# Patient Record
Sex: FEMALE | Race: WHITE | NOT HISPANIC OR LATINO | Employment: OTHER | URBAN - METROPOLITAN AREA
[De-identification: names, ages, dates, MRNs, and addresses within clinical notes are randomized per-mention and may not be internally consistent; named-entity substitution may affect disease eponyms.]

---

## 2017-03-01 ENCOUNTER — OFFICE VISIT (OUTPATIENT)
Dept: FAMILY MEDICINE CLINIC | Facility: CLINIC | Age: 77
End: 2017-03-01

## 2017-03-01 VITALS
HEART RATE: 54 BPM | WEIGHT: 109 LBS | RESPIRATION RATE: 16 BRPM | DIASTOLIC BLOOD PRESSURE: 73 MMHG | TEMPERATURE: 98.3 F | HEIGHT: 62 IN | SYSTOLIC BLOOD PRESSURE: 141 MMHG | BODY MASS INDEX: 20.06 KG/M2

## 2017-03-01 DIAGNOSIS — Z12.31 ENCOUNTER FOR SCREENING MAMMOGRAM FOR MALIGNANT NEOPLASM OF BREAST: ICD-10-CM

## 2017-03-01 DIAGNOSIS — N95.9 MENOPAUSAL AND PERIMENOPAUSAL DISORDER: ICD-10-CM

## 2017-03-01 DIAGNOSIS — Z13.6 SCREENING FOR ISCHEMIC HEART DISEASE: ICD-10-CM

## 2017-03-01 DIAGNOSIS — I10 ESSENTIAL HYPERTENSION: Primary | ICD-10-CM

## 2017-03-01 DIAGNOSIS — F43.21 GRIEF REACTION: ICD-10-CM

## 2017-03-01 DIAGNOSIS — Z12.39 BREAST CANCER SCREENING: ICD-10-CM

## 2017-03-01 DIAGNOSIS — R32 URINARY INCONTINENCE, UNSPECIFIED TYPE: ICD-10-CM

## 2017-03-01 DIAGNOSIS — K59.04 CHRONIC IDIOPATHIC CONSTIPATION: ICD-10-CM

## 2017-03-01 DIAGNOSIS — F33.42 RECURRENT MAJOR DEPRESSIVE DISORDER, IN FULL REMISSION (HCC): ICD-10-CM

## 2017-03-01 PROCEDURE — 99214 OFFICE O/P EST MOD 30 MIN: CPT | Performed by: FAMILY MEDICINE

## 2017-03-01 RX ORDER — OXYBUTYNIN CHLORIDE 10 MG/1
10 TABLET, EXTENDED RELEASE ORAL DAILY
Qty: 90 TABLET | Refills: 1 | Status: SHIPPED | OUTPATIENT
Start: 2017-03-01 | End: 2017-05-23 | Stop reason: SDUPTHER

## 2017-03-01 RX ORDER — LISINOPRIL 5 MG/1
5 TABLET ORAL DAILY
Qty: 90 TABLET | Refills: 1 | Status: SHIPPED | OUTPATIENT
Start: 2017-03-01 | End: 2017-05-23 | Stop reason: SDUPTHER

## 2017-03-01 RX ORDER — CITALOPRAM 20 MG/1
20 TABLET ORAL DAILY
Qty: 90 TABLET | Refills: 1 | Status: SHIPPED | OUTPATIENT
Start: 2017-03-01 | End: 2017-05-23 | Stop reason: SDUPTHER

## 2017-03-01 NOTE — PROGRESS NOTES
"Chief Complaint   Patient presents with   • Hypertension   • Hypothyroidism       Subjective   Pt is here for bp  Check . It is stable. Depression is stable. Urinary sx are stable. No medication side effects noted. Mammogram is due.   PHQ-2 Depression Screening Questionaire    During the last month, have you often been bothered by feeling down, depressed, or hopeless? no    During the last month, have you often been bothered by having little interest or pleasure in doing things? no    Review of Systems   Constitutional: Negative for fatigue.   Cardiovascular: Negative for chest pain.   Gastrointestinal: Positive for constipation.       Objective   Visit Vitals   • /73   • Pulse 54   • Temp 98.3 °F (36.8 °C) (Oral)   • Resp 16   • Ht 62\" (157.5 cm)   • Wt 109 lb (49.4 kg)   • BMI 19.94 kg/m2     Body mass index is 19.94 kg/(m^2).  Physical Exam   Constitutional: She is cooperative. No distress.   Eyes: Conjunctivae and lids are normal.   Neck: Carotid bruit is not present. No tracheal deviation present.   Cardiovascular: Normal rate, regular rhythm and normal heart sounds.    No murmur heard.  Pulmonary/Chest: Effort normal and breath sounds normal.   Neurological: She is alert. She is not disoriented.   Skin: Skin is warm and dry.   Psychiatric: She has a normal mood and affect. Her speech is normal and behavior is normal.   Vitals reviewed.      Assessment/Plan     Problem List Items Addressed This Visit        Cardiovascular and Mediastinum    Essential hypertension - Primary    Relevant Medications    lisinopril (PRINIVIL,ZESTRIL) 5 MG tablet    Other Relevant Orders    Comprehensive metabolic panel    CBC and Differential    TSH       Digestive    Chronic idiopathic constipation       Other    Grief reaction    Relevant Medications    citalopram (CeleXA) 20 MG tablet    Depression    Relevant Medications    citalopram (CeleXA) 20 MG tablet    Other Relevant Orders    Comprehensive metabolic panel    CBC " and Differential    Urinary incontinence    Relevant Medications    oxybutynin XL (DITROPAN-XL) 10 MG 24 hr tablet    Other Relevant Orders    Comprehensive metabolic panel    CBC and Differential    Menopausal and perimenopausal disorder    Relevant Medications    estrogens, conjugated, (PREMARIN) 0.625 MG tablet      Other Visit Diagnoses     Breast cancer screening        Relevant Orders    Mammo Screening Bilateral With CAD    Encounter for screening mammogram for malignant neoplasm of breast         Relevant Orders    Mammo Screening Bilateral With CAD    Screening for ischemic heart disease        Relevant Orders    Lipid Panel With LDL/HDL Ratio          Outpatient Encounter Prescriptions as of 3/1/2017   Medication Sig Dispense Refill   • acetaminophen (TYLENOL) 500 MG tablet Take 500 mg by mouth every 6 (six) hours as needed for mild pain (1-3).     • aspirin 81 MG tablet Take 81 mg by mouth daily.     • Calcium Carb-Cholecalciferol (CALCIUM + D3 PO) Take  by mouth.     • levothyroxine (SYNTHROID, LEVOTHROID) 112 MCG tablet Take 112 mcg by mouth daily.     • Multiple Vitamin (MULTIVITAMIN) tablet Take 1 tablet by mouth daily.     • citalopram (CeleXA) 20 MG tablet Take 1 tablet by mouth Daily for 180 days. 90 tablet 1   • estrogens, conjugated, (PREMARIN) 0.625 MG tablet Take 1 tablet by mouth Daily for 180 days. 90 tablet 1   • lisinopril (PRINIVIL,ZESTRIL) 5 MG tablet Take 1 tablet by mouth Daily for 180 days. 90 tablet 1   • oxybutynin XL (DITROPAN-XL) 10 MG 24 hr tablet Take 1 tablet by mouth Daily for 180 days. 90 tablet 1   • [DISCONTINUED] citalopram (CeleXA) 20 MG tablet Take 1 tablet by mouth daily for 180 days. 90 tablet 1   • [DISCONTINUED] estrogens, conjugated, (PREMARIN) 0.625 MG tablet Take 1 tablet by mouth daily for 180 days. 90 tablet 1   • [DISCONTINUED] lisinopril (PRINIVIL,ZESTRIL) 5 MG tablet Take 1 tablet by mouth daily for 180 days. 90 tablet 1   • [DISCONTINUED] oxybutynin XL  (DITROPAN-XL) 10 MG 24 hr tablet Take 1 tablet by mouth daily for 180 days. 90 tablet 1     No facility-administered encounter medications on file as of 3/1/2017.        Orders Placed This Encounter   Procedures   • Mammo Screening Bilateral With CAD     Standing Status:   Future     Standing Expiration Date:   8/28/2017     Order Specific Question:   Reason for Exam:     Answer:   screening   • Comprehensive metabolic panel     Standing Status:   Future     Standing Expiration Date:   11/26/2017   • Lipid Panel With LDL/HDL Ratio     Standing Status:   Future     Standing Expiration Date:   11/26/2017   • TSH     Standing Status:   Future     Standing Expiration Date:   11/26/2017       Continue with current treatment plan.  Dash diet and info on constipation sent on AVS.       F/U in 6 months

## 2017-03-01 NOTE — PATIENT INSTRUCTIONS
Constipation, Adult  Constipation is when a person has fewer than three bowel movements a week, has difficulty having a bowel movement, or has stools that are dry, hard, or larger than normal. As people grow older, constipation is more common. A low-fiber diet, not taking in enough fluids, and taking certain medicines may make constipation worse.   CAUSES   · Certain medicines, such as antidepressants, pain medicine, iron supplements, antacids, and water pills.    · Certain diseases, such as diabetes, irritable bowel syndrome (IBS), thyroid disease, or depression.    · Not drinking enough water.    · Not eating enough fiber-rich foods.    · Stress or travel.    · Lack of physical activity or exercise.    · Ignoring the urge to have a bowel movement.    · Using laxatives too much.    SIGNS AND SYMPTOMS   · Having fewer than three bowel movements a week.    · Straining to have a bowel movement.    · Having stools that are hard, dry, or larger than normal.    · Feeling full or bloated.    · Pain in the lower abdomen.    · Not feeling relief after having a bowel movement.    DIAGNOSIS   Your health care provider will take a medical history and perform a physical exam. Further testing may be done for severe constipation. Some tests may include:  · A barium enema X-ray to examine your rectum, colon, and, sometimes, your small intestine.    · A sigmoidoscopy to examine your lower colon.    · A colonoscopy to examine your entire colon.  TREATMENT   Treatment will depend on the severity of your constipation and what is causing it. Some dietary treatments include drinking more fluids and eating more fiber-rich foods. Lifestyle treatments may include regular exercise. If these diet and lifestyle recommendations do not help, your health care provider may recommend taking over-the-counter laxative medicines to help you have bowel movements. Prescription medicines may be prescribed if over-the-counter medicines do not work.  "  HOME CARE INSTRUCTIONS   · Eat foods that have a lot of fiber, such as fruits, vegetables, whole grains, and beans.  · Limit foods high in fat and processed sugars, such as french fries, hamburgers, cookies, candies, and soda.    · A fiber supplement may be added to your diet if you cannot get enough fiber from foods.    · Drink enough fluids to keep your urine clear or pale yellow.    · Exercise regularly or as directed by your health care provider.    · Go to the restroom when you have the urge to go. Do not hold it.    · Only take over-the-counter or prescription medicines as directed by your health care provider. Do not take other medicines for constipation without talking to your health care provider first.    SEEK IMMEDIATE MEDICAL CARE IF:   · You have bright red blood in your stool.    · Your constipation lasts for more than 4 days or gets worse.    · You have abdominal or rectal pain.    · You have thin, pencil-like stools.    · You have unexplained weight loss.  MAKE SURE YOU:   · Understand these instructions.  · Will watch your condition.  · Will get help right away if you are not doing well or get worse.     This information is not intended to replace advice given to you by your health care provider. Make sure you discuss any questions you have with your health care provider.     Document Released: 09/15/2005 Document Revised: 01/08/2016 Document Reviewed: 09/29/2014  [employer] Interactive Patient Education ©2016 [employer]  DASH Eating Plan  DASH stands for \"Dietary Approaches to Stop Hypertension.\" The DASH eating plan is a healthy eating plan that has been shown to reduce high blood pressure (hypertension). Additional health benefits may include reducing the risk of type 2 diabetes mellitus, heart disease, and stroke. The DASH eating plan may also help with weight loss.  WHAT DO I NEED TO KNOW ABOUT THE DASH EATING PLAN?  For the DASH eating plan, you will follow these general " "guidelines:  · Choose foods with a percent daily value for sodium of less than 5% (as listed on the food label).  · Use salt-free seasonings or herbs instead of table salt or sea salt.  · Check with your health care provider or pharmacist before using salt substitutes.  · Eat lower-sodium products, often labeled as \"lower sodium\" or \"no salt added.\"  · Eat fresh foods.  · Eat more vegetables, fruits, and low-fat dairy products.  · Choose whole grains. Look for the word \"whole\" as the first word in the ingredient list.  · Choose fish and skinless chicken or turkey more often than red meat. Limit fish, poultry, and meat to 6 oz (170 g) each day.  · Limit sweets, desserts, sugars, and sugary drinks.  · Choose heart-healthy fats.  · Limit cheese to 1 oz (28 g) per day.  · Eat more home-cooked food and less restaurant, buffet, and fast food.  · Limit fried foods.  · Cook foods using methods other than frying.  · Limit canned vegetables. If you do use them, rinse them well to decrease the sodium.  · When eating at a restaurant, ask that your food be prepared with less salt, or no salt if possible.  WHAT FOODS CAN I EAT?  Seek help from a dietitian for individual calorie needs.  Grains  Whole grain or whole wheat bread. Brown rice. Whole grain or whole wheat pasta. Quinoa, bulgur, and whole grain cereals. Low-sodium cereals. Corn or whole wheat flour tortillas. Whole grain cornbread. Whole grain crackers. Low-sodium crackers.  Vegetables  Fresh or frozen vegetables (raw, steamed, roasted, or grilled). Low-sodium or reduced-sodium tomato and vegetable juices. Low-sodium or reduced-sodium tomato sauce and paste. Low-sodium or reduced-sodium canned vegetables.   Fruits  All fresh, canned (in natural juice), or frozen fruits.  Meat and Other Protein Products  Ground beef (85% or leaner), grass-fed beef, or beef trimmed of fat. Skinless chicken or turkey. Ground chicken or turkey. Pork trimmed of fat. All fish and seafood. " Eggs. Dried beans, peas, or lentils. Unsalted nuts and seeds. Unsalted canned beans.  Dairy  Low-fat dairy products, such as skim or 1% milk, 2% or reduced-fat cheeses, low-fat ricotta or cottage cheese, or plain low-fat yogurt. Low-sodium or reduced-sodium cheeses.  Fats and Oils  Tub margarines without trans fats. Light or reduced-fat mayonnaise and salad dressings (reduced sodium). Avocado. Safflower, olive, or canola oils. Natural peanut or almond butter.  Other  Unsalted popcorn and pretzels.  The items listed above may not be a complete list of recommended foods or beverages. Contact your dietitian for more options.  WHAT FOODS ARE NOT RECOMMENDED?  Grains  White bread. White pasta. White rice. Refined cornbread. Bagels and croissants. Crackers that contain trans fat.  Vegetables  Creamed or fried vegetables. Vegetables in a cheese sauce. Regular canned vegetables. Regular canned tomato sauce and paste. Regular tomato and vegetable juices.  Fruits  Dried fruits. Canned fruit in light or heavy syrup. Fruit juice.  Meat and Other Protein Products  Fatty cuts of meat. Ribs, chicken wings, irizarry, sausage, bologna, salami, chitterlings, fatback, hot dogs, bratwurst, and packaged luncheon meats. Salted nuts and seeds. Canned beans with salt.  Dairy  Whole or 2% milk, cream, half-and-half, and cream cheese. Whole-fat or sweetened yogurt. Full-fat cheeses or blue cheese. Nondairy creamers and whipped toppings. Processed cheese, cheese spreads, or cheese curds.  Condiments  Onion and garlic salt, seasoned salt, table salt, and sea salt. Canned and packaged gravies. Worcestershire sauce. Tartar sauce. Barbecue sauce. Teriyaki sauce. Soy sauce, including reduced sodium. Steak sauce. Fish sauce. Oyster sauce. Cocktail sauce. Horseradish. Ketchup and mustard. Meat flavorings and tenderizers. Bouillon cubes. Hot sauce. Tabasco sauce. Marinades. Taco seasonings. Relishes.  Fats and Oils  Butter, stick margarine, lard,  shortening, ghee, and irizarry fat. Coconut, palm kernel, or palm oils. Regular salad dressings.  Other  Pickles and olives. Salted popcorn and pretzels.  The items listed above may not be a complete list of foods and beverages to avoid. Contact your dietitian for more information.  WHERE CAN I FIND MORE INFORMATION?  National Heart, [employer], and [employer] [place]: www.nhlbi.nih.gov/health/health-topics/topics/dash/     This information is not intended to replace advice given to you by your health care provider. Make sure you discuss any questions you have with your health care provider.     Document Released: 12/06/2012 Document Revised: 01/08/2016 Document Reviewed: 10/22/2014  [employer] Interactive Patient Education ©2016 [employer]

## 2017-03-30 ENCOUNTER — OFFICE VISIT (OUTPATIENT)
Dept: FAMILY MEDICINE CLINIC | Facility: CLINIC | Age: 77
End: 2017-03-30

## 2017-03-30 VITALS
DIASTOLIC BLOOD PRESSURE: 71 MMHG | WEIGHT: 109 LBS | RESPIRATION RATE: 16 BRPM | BODY MASS INDEX: 20.06 KG/M2 | HEART RATE: 66 BPM | TEMPERATURE: 98.1 F | HEIGHT: 62 IN | SYSTOLIC BLOOD PRESSURE: 138 MMHG

## 2017-03-30 DIAGNOSIS — D64.9 ANEMIA, UNSPECIFIED: ICD-10-CM

## 2017-03-30 DIAGNOSIS — N39.0 URINARY TRACT INFECTION, SITE UNSPECIFIED: ICD-10-CM

## 2017-03-30 DIAGNOSIS — Z09 HOSPITAL DISCHARGE FOLLOW-UP: Primary | ICD-10-CM

## 2017-03-30 PROCEDURE — 99214 OFFICE O/P EST MOD 30 MIN: CPT | Performed by: FAMILY MEDICINE

## 2017-03-30 RX ORDER — METAXALONE 800 MG/1
800 TABLET ORAL 3 TIMES DAILY
COMMUNITY
Start: 2017-03-05 | End: 2017-03-23 | Stop reason: SINTOL

## 2017-03-30 RX ORDER — NITROFURANTOIN 25; 75 MG/1; MG/1
100 CAPSULE ORAL 2 TIMES DAILY
COMMUNITY
Start: 2017-03-21 | End: 2017-03-30

## 2017-03-30 NOTE — PROGRESS NOTES
Chief Complaint   Patient presents with   • hospital follow up       Subjective   Presents the office to follow-up on recent visits to urgent care and emergency room.  Her initial visit was to urgent care on arch fifth 2017.  She had been cleaning behind her washer and dryer.  She pulled on the dry with no problems.  She pulled on the washer and strained her neck.  She was seen initially and prescribed with Skelaxin to use as needed for spasm.  This note was documented  on 2017.The patient then went home and about 1 week later was once again cleaning behind the washer.  She stepped on a step stool and felt dizziness and then slipped down between the wall and the door.  She had injuries and went back to the emergency room.  The second emergency room visit was 2017 for neck pain and laceration of left arm. Labs showed low RBC and hemoglobin. Pt still feels bad with anemia. Head is ringing and buzzing. Pt quit nitrofurantoin for UTI.    Current medications have been reviewed and reconciled with discharge medications during today's encounter visit.   RADIOLOGY REPORT    FACILITY:  [facility]  UNIT/AGE/GENDER: J.ED  ER      AGE:76 Y          SEX:F  PATIENT NAME/:  JOSE TANG L    1940  UNIT NUMBER:  [id number]  ACCOUNT NUMBER:  [id number]  ACCESSION NUMBER:  [id number]  [id number]  [id number]    CT HEAD WO CONTRAST, CT LUMBAR SPINE WO CONTRAST, CT CERVICAL SPINE WO CONTRAST    DATE: 2017    COMPARISON: Cervical spine plain film from 2016, sacrum and coccyx films from 2014 and CT of the abdomen and pelvis from May 28, 2009.    INDICATION: fall 1 week ago, off-balance since. Posterior head injury. Neck pain and lower back pain. Initial encounter.    TECHNIQUE:  Axial/helical CT imaging was performed through the head, cervical spine, and lumbar spine without contrast.. A radiation dose reduction device was utilized for this exam as per  ALARA protocol.    FINDINGS:     CT HEAD:    Mild generalized sulcal prominence could reflect atrophy. Ventricles and cisterns are patent. No extra-axial fluid collections. No acute hemorrhage or mass effect. Low-attenuation changes in the white matter regions of the brain are nonspecific although   most compatible with chronic ischemic/gliotic small vessel disease and/or demyelination. Small older appearing lacunar type infarcts noted in the right basal ganglia and thalamic region.    Atherosclerotic vascular calcifications are noted along the carotid siphons and distal vertebral arteries. No asymmetric hyperdense vessel. The bony calvarium is intact. Right mastoid is clear. Partial opacification of the left mastoid air cells could   reflect mastoid effusion. Visualized paranasal sinuses are patent.    IMPRESSION:  1. No acute intracranial hemorrhage or mass effect.  2. Mild atrophy.  3. Other chronic appearing changes as described.  4. Evidence of left mastoid effusion.    CT cervical spine:    There is a reversal of the usual cervical lordosis. There is a grade 1 anterolisthesis of C3 on C4 and C4 on C5. There is minimal anterolisthesis of C2 on C3 and C5 on C6 as well as C7 on T1. There is slight retrolisthesis of C6 on C7. No acute fracture   is seen.    There is multilevel degenerative change with severe disc degenerative change at C5-C6 and C6-C7. Posterior lateral disc osteophyte formation at multiple levels along with multilevel facet degenerative change contributing to some multilevel foraminal   stenosis. Spinal canal is patent with no significant stenosis demonstrated. Biapical parenchymal changes could be chronic in nature although superimposed acute infiltrate is difficult to exclude. There is evidence of mild bronchiectasis towards the   apical regions. Calcifications are noted consistent with old healed granulomatous disease. Atherosclerotic calcifications noted near the carotid  bifurcations.    IMPRESSION:  1. No definite acute bony abnormality.  2. Multilevel degenerative/arthritic changes along the cervical spine as described above.  3. Cervical alignment as described.  4. Parenchymal changes in both apical regions likely chronic in nature although comparison studies are not available to document stability. Comparison studies would be helpful if available. If none are available, would consider chest CT follow-up    Lumbar spine:    Dextroscoliosis of the lumbar spine is again noted with a left lateral listhesis of L2 on L3 and L3 on L4 and right lateral listhesis of L4 on L5. There is minimal anterolisthesis of L4 on L5. Vertebral body heights are maintained. There is multilevel   disc degenerative change and vacuum disc phenomenon with severe narrowing of the disc spaces at L2-L3, L3-L4, and L4-L5. There is multilevel facet degenerative change.    There is mild posterior lateral disc bulge at L1-L2. Spinal canal and foramina are patent.    There is moderate posterior lateral disc bulge and/or protrusion at L2-L3. There is mild to moderate narrowing of the spinal canal. There is mild bilateral foraminal narrowing    L3-L4 there is posterior lateral disc osteophyte complex with asymmetry towards the left. There is calcification and/or ossification along the posterior protruding disc. There is facet and ligamentum flavum hypertrophic change. There is moderate central   stenosis. There is left greater than right foraminal stenosis.    At L4-L5 there is a posterior lateral disc osteophyte complex with asymmetry towards the left. There is facet and ligament flavum hypertrophic change with mild/moderate central stenosis and left greater than right foraminal stenosis.    At L5-S1 there is a posterior convex disc bulge or slight protrusion. There is bilateral facet disease with right greater than left foraminal stenosis.    There is some mild degenerative change of the sacroiliac  "joints.    There is mild decreased density of the blood within the aorta compared to the aortic wall. This could possibly reflect the presence of anemia.    IMPRESSION:  1. No acute bony abnormality.  2. Multilevel degenerative changes which contribute to spinal and foraminal stenosis.  3. Lumbar alignment as described above.  4. Lower density of the blood within the aorta can possibly indicate the presence of anemia. Correlate with pertinent clinical and laboratory findings.    Dictated by: Don Leong M.D.    Images and Report reviewed and interpreted by: Don Leong M.D.  Urinalysis3/21/2017  [facility]  Component Name Value Ref Range   Color-Urine YELLOW     Clarity-Urine CLEAR     Glucose-Urine NEGATIVE NEGATIVE    Bilirubin-Urine NEGATIVE NEGATIVE    Ketone-Urine TRACE (A) NEGATIVE    Specific Gravity-Urine 1.010 1.005 - 1.03      Occult Blood-Urine SMALL (A) NEGATIVE    pH-Urine 7.5 5.0 - 9.0      Protein-Urine TRACE (A) NEGATIVE    Urobilinogen-Urine 0.2 <=1.0 (EhrlichU)/dL    Nitrite-Urine NEGATIVE     Leukocyte Esterase-Urine TRACE (A) NEGATIVE    Source-Urine CLEAN CATCH          Review of Systems   Constitutional: Positive for fatigue.   HENT: Positive for tinnitus.    Eyes: Positive for visual disturbance (blurry vision).   Neurological: Negative for dizziness.       Objective   /71  Pulse 66  Temp 98.1 °F (36.7 °C) (Oral)   Resp 16  Ht 62\" (157.5 cm)  Wt 109 lb (49.4 kg)  BMI 19.94 kg/m2  Body mass index is 19.94 kg/(m^2).  Physical Exam   Constitutional: She is cooperative. No distress.   HENT:   Upper lip fever blister   Eyes: Conjunctivae and lids are normal.   Neck: Carotid bruit is not present. No tracheal deviation present.   Cardiovascular: Normal rate, regular rhythm and normal heart sounds.    No murmur heard.  Pulmonary/Chest: Effort normal and breath sounds normal.   Neurological: She is alert. She is not disoriented.   Skin: Skin is warm and dry.   Psychiatric: " She has a normal mood and affect. Her speech is normal and behavior is normal.   Vitals reviewed.      Assessment/Plan     Problem List Items Addressed This Visit        Hematopoietic and Hemostatic    Anemia, unspecified    Relevant Orders    CBC & Differential    Ferritin    Iron Profile    Vitamin B12 & Folate    Ambulatory Referral to Hematology / Oncology      Other Visit Diagnoses     Hospital discharge follow-up    -  Primary    Urinary tract infection, site unspecified        Relevant Orders    Urine Culture          Outpatient Encounter Prescriptions as of 3/30/2017   Medication Sig Dispense Refill   • acetaminophen (TYLENOL) 500 MG tablet Take 500 mg by mouth every 6 (six) hours as needed for mild pain (1-3).     • aspirin 81 MG tablet Take 81 mg by mouth daily.     • Calcium Carb-Cholecalciferol (CALCIUM + D3 PO) Take  by mouth.     • citalopram (CeleXA) 20 MG tablet Take 1 tablet by mouth Daily for 180 days. 90 tablet 1   • estrogens, conjugated, (PREMARIN) 0.625 MG tablet Take 1 tablet by mouth Daily for 180 days. 90 tablet 1   • levothyroxine (SYNTHROID, LEVOTHROID) 112 MCG tablet Take 112 mcg by mouth daily.     • lisinopril (PRINIVIL,ZESTRIL) 5 MG tablet Take 1 tablet by mouth Daily for 180 days. 90 tablet 1   • Multiple Vitamin (MULTIVITAMIN) tablet Take 1 tablet by mouth daily.     • oxybutynin XL (DITROPAN-XL) 10 MG 24 hr tablet Take 1 tablet by mouth Daily for 180 days. 90 tablet 1   • [DISCONTINUED] nitrofurantoin, macrocrystal-monohydrate, (MACROBID) 100 MG capsule Take 100 mg by mouth 2 (Two) Times a Day. Given at ER     • [DISCONTINUED] metaxalone (SKELAXIN) 800 MG tablet Take 800 mg by mouth 3 (Three) Times a Day.       No facility-administered encounter medications on file as of 3/30/2017.        Orders Placed This Encounter   Procedures   • Urine Culture     Standing Status:   Future     Standing Expiration Date:   3/30/2018   • Ferritin   • Iron Profile   • Vitamin B12 & Folate   •  Ambulatory Referral to Hematology / Oncology     Referral Priority:   Routine     Referral Type:   Consultation     Referral Reason:   Specialty Services Required     Requested Specialty:   Hematology and Oncology     Number of Visits Requested:   1       Continue with current treatment plan.         RTC as needed or sooner if symptoms worsen or change

## 2017-03-30 NOTE — PATIENT INSTRUCTIONS
Please do not take muscle relaxer.  Please do not take antibiotic for urinary tract infection.  Please get urine culture through [facility] on Monday.  Please expect a call from hematologist to help workup anemia.

## 2017-03-31 LAB
BASOPHILS # BLD AUTO: 0.02 10*3/MM3 (ref 0–0.2)
BASOPHILS NFR BLD AUTO: 0.3 % (ref 0–1.5)
EOSINOPHIL # BLD AUTO: 0.03 10*3/MM3 (ref 0–0.7)
EOSINOPHIL NFR BLD AUTO: 0.5 % (ref 0.3–6.2)
ERYTHROCYTE [DISTWIDTH] IN BLOOD BY AUTOMATED COUNT: 14.3 % (ref 11.7–13)
FERRITIN SERPL-MCNC: 37.3 NG/ML (ref 13–150)
FOLATE SERPL-MCNC: >20 NG/ML (ref 4.78–24.2)
HCT VFR BLD AUTO: 27.3 % (ref 35.6–45.5)
HGB BLD-MCNC: 8.8 G/DL (ref 11.9–15.5)
IMM GRANULOCYTES # BLD: 0.1 10*3/MM3 (ref 0–0.03)
IMM GRANULOCYTES NFR BLD: 1.5 % (ref 0–0.5)
IRON SATN MFR SERPL: 6 % (ref 20–50)
IRON SERPL-MCNC: 36 MCG/DL (ref 37–145)
LYMPHOCYTES # BLD AUTO: 2.46 10*3/MM3 (ref 0.9–4.8)
LYMPHOCYTES NFR BLD AUTO: 36.9 % (ref 19.6–45.3)
MCH RBC QN AUTO: 27.2 PG (ref 26.9–32)
MCHC RBC AUTO-ENTMCNC: 32.2 G/DL (ref 32.4–36.3)
MCV RBC AUTO: 84.3 FL (ref 80.5–98.2)
MONOCYTES # BLD AUTO: 0.51 10*3/MM3 (ref 0.2–1.2)
MONOCYTES NFR BLD AUTO: 7.7 % (ref 5–12)
NEUTROPHILS # BLD AUTO: 3.54 10*3/MM3 (ref 1.9–8.1)
NEUTROPHILS NFR BLD AUTO: 53.1 % (ref 42.7–76)
NRBC BLD AUTO-RTO: 0 /100 WBC (ref 0–0)
PLATELET # BLD AUTO: 340 10*3/MM3 (ref 140–500)
RBC # BLD AUTO: 3.24 10*6/MM3 (ref 3.9–5.2)
TIBC SERPL-MCNC: 584 MCG/DL
UIBC SERPL-MCNC: 548 MCG/DL
VIT B12 SERPL-MCNC: 1309 PG/ML (ref 211–946)
WBC # BLD AUTO: 6.66 10*3/MM3 (ref 4.5–10.7)

## 2017-04-03 ENCOUNTER — RESULTS ENCOUNTER (OUTPATIENT)
Dept: FAMILY MEDICINE CLINIC | Facility: CLINIC | Age: 77
End: 2017-04-03

## 2017-04-03 DIAGNOSIS — N39.0 URINARY TRACT INFECTION, SITE UNSPECIFIED: ICD-10-CM

## 2017-04-06 ENCOUNTER — APPOINTMENT (OUTPATIENT)
Dept: LAB | Facility: HOSPITAL | Age: 77
End: 2017-04-06
Attending: FAMILY MEDICINE

## 2017-04-18 PROBLEM — I67.82 TEMPORARY CEREBRAL VASCULAR DYSFUNCTION: Status: ACTIVE | Noted: 2017-03-31

## 2017-04-20 ENCOUNTER — OFFICE VISIT (OUTPATIENT)
Dept: FAMILY MEDICINE CLINIC | Facility: CLINIC | Age: 77
End: 2017-04-20

## 2017-04-20 VITALS
DIASTOLIC BLOOD PRESSURE: 73 MMHG | WEIGHT: 112 LBS | RESPIRATION RATE: 16 BRPM | HEIGHT: 62 IN | SYSTOLIC BLOOD PRESSURE: 130 MMHG | HEART RATE: 56 BPM | TEMPERATURE: 97.9 F | BODY MASS INDEX: 20.61 KG/M2

## 2017-04-20 DIAGNOSIS — E03.9 ACQUIRED HYPOTHYROIDISM: ICD-10-CM

## 2017-04-20 DIAGNOSIS — N39.0 E. COLI UTI: ICD-10-CM

## 2017-04-20 DIAGNOSIS — A41.51 E. COLI SEPSIS (HCC): ICD-10-CM

## 2017-04-20 DIAGNOSIS — Z09 HOSPITAL DISCHARGE FOLLOW-UP: Primary | ICD-10-CM

## 2017-04-20 DIAGNOSIS — D50.8 OTHER IRON DEFICIENCY ANEMIA: ICD-10-CM

## 2017-04-20 DIAGNOSIS — B96.20 E. COLI UTI: ICD-10-CM

## 2017-04-20 DIAGNOSIS — G45.9 TRANSIENT CEREBRAL ISCHEMIA, UNSPECIFIED TYPE: ICD-10-CM

## 2017-04-20 PROBLEM — D50.9 IRON DEFICIENCY ANEMIA: Status: ACTIVE | Noted: 2017-03-30

## 2017-04-20 PROCEDURE — 99214 OFFICE O/P EST MOD 30 MIN: CPT | Performed by: FAMILY MEDICINE

## 2017-04-20 RX ORDER — CLOPIDOGREL BISULFATE 75 MG/1
75 TABLET ORAL DAILY
Qty: 90 TABLET | Refills: 1 | Status: SHIPPED | OUTPATIENT
Start: 2017-04-20 | End: 2017-05-23 | Stop reason: SDUPTHER

## 2017-04-20 RX ORDER — ACETAMINOPHEN,DIPHENHYDRAMINE HCL 500; 25 MG/1; MG/1
1 TABLET, FILM COATED ORAL DAILY
COMMUNITY
End: 2018-03-05

## 2017-04-20 RX ORDER — AMOXICILLIN 500 MG/1
500 TABLET, FILM COATED ORAL
COMMUNITY
Start: 2017-04-09 | End: 2017-04-20

## 2017-04-20 RX ORDER — DOXYCYCLINE HYCLATE 50 MG/1
324 CAPSULE, GELATIN COATED ORAL
COMMUNITY
Start: 2017-04-01 | End: 2017-05-03 | Stop reason: SDUPTHER

## 2017-04-20 RX ORDER — ACETAMINOPHEN 325 MG/1
650 TABLET ORAL EVERY 6 HOURS PRN
COMMUNITY

## 2017-04-20 RX ORDER — CLOPIDOGREL BISULFATE 75 MG/1
75 TABLET ORAL
COMMUNITY
Start: 2017-04-01 | End: 2017-04-20 | Stop reason: SDUPTHER

## 2017-04-20 RX ORDER — PSEUDOEPHEDRINE HCL 30 MG
100 TABLET ORAL
COMMUNITY
Start: 2017-04-09 | End: 2019-12-30

## 2017-04-20 RX ORDER — LEVOTHYROXINE SODIUM 0.1 MG/1
100 TABLET ORAL DAILY
COMMUNITY
End: 2018-03-05

## 2017-04-20 NOTE — PROGRESS NOTES
Chief Complaint   Patient presents with   • hospital follow up       Subjective   This patient presents the office to follow-up on recent hospital visits.  She was seen in the hospital for a TIA towards the end of 2017.  She was then admitted for urinary tract infection and Escherichia coli sepsis.  He was found that she had iron deficiency anemia and out-of-control thyroid disease.  Her Synthroid was increased to 100 and she has appointment with endocrinology in the near future.  Her anemia was treated with iron gluconate.  It is improving.  Most recent blood cultures are negative for Escherichia coli.  Her urinary tract infection is being treated with amoxicillin.  She still has some burning.  We will check urine cultures after antibiotics are completed early next week.  Her care provider team has been updated during today's office visit.  We have reconciled her medication list with the post hospital discharge medication list during today's visit.  Estrogen will need to be discontinued due to her history of recent TIA.  Data Reviewed:  FACILITY:  [facility]  UNIT/AGE/GENDER: J.ED  ER      AGE:76 Y          SEX:F  PATIENT NAME/:  JOSE TANG L    1940  UNIT NUMBER:  [id number]  ACCOUNT NUMBER:  [id number]  ACCESSION NUMBER:  [id number]    EXAMINATION: MRI OF THE BRAIN WITHOUT CONTRAST.    DATE OF EXAM(S): 2017 at 1600 hours.     CLINICAL HISTORY: Sensation of imbalance, visual disturbance.    COMPARISON: Preceding head CT and CT angiogram at 1345 hours.    FINDINGS: Mild generalized atrophy and moderate chronic small vessel ischemic disease of the hemispheric white matter are redemonstrated. A 5 mm chronic lacunar infarct of the right frontal lobe white matter is redemonstrated. Additional chronic small   vessel ischemic disease is noted in the mikki.    No mass, hemorrhage, or acute infarct is detected. Normal flow voids are maintained in the vertebrobasilar and cavernous  "internal carotid arteries. The pituitary gland is normal in height. The craniocervical junction is normal. Minor ethmoid air cell   mucosal thickening is noted.    IMPRESSION: No acute intracranial abnormality.    Dictated by: Fransisco Anguiano M.D.    Images and Report reviewed and interpreted by: Fransisco Anguiano M.D.    <PS><Electronically signed by: Fransisco Anguiano M.D.>  03/31/2017 1611    D: 03/31/2017 1607  T: 03/31/2017 1607    Social History   Substance Use Topics   • Smoking status: Former Smoker   • Smokeless tobacco: Never Used   • Alcohol use Yes       Review of Systems   Genitourinary: Positive for dysuria (mild).       Objective   /73  Pulse 56  Temp 97.9 °F (36.6 °C) (Oral)   Resp 16  Ht 62\" (157.5 cm)  Wt 112 lb (50.8 kg)  BMI 20.49 kg/m2  Body mass index is 20.49 kg/(m^2).  Physical Exam   Constitutional: She is cooperative. No distress.   Eyes: Conjunctivae and lids are normal.   Neck: Carotid bruit is not present. No tracheal deviation present.   Cardiovascular: Normal rate and regular rhythm.    Murmur heard.   Systolic murmur is present with a grade of 2/6   Pulmonary/Chest: Effort normal and breath sounds normal.   Neurological: She is alert. She is not disoriented.   Skin: Skin is warm and dry.   Psychiatric: She has a normal mood and affect. Her speech is normal and behavior is normal.   Vitals reviewed.      Assessment/Plan     Problem List Items Addressed This Visit        Cardiovascular and Mediastinum    TIA (transient ischemic attack)    Relevant Medications    clopidogrel (PLAVIX) 75 MG tablet       Endocrine    Acquired hypothyroidism    Relevant Medications    levothyroxine (SYNTHROID, LEVOTHROID) 100 MCG tablet       Genitourinary    E. coli UTI    Relevant Medications    amoxicillin (AMOXIL) 500 MG tablet    Other Relevant Orders    Urine Culture    Urinalysis With Microscopic       Hematopoietic and Hemostatic    Iron deficiency anemia    Relevant Medications    ferrous " gluconate (FERGON) 324 MG tablet       Other    RESOLVED: E. coli sepsis    Relevant Medications    amoxicillin (AMOXIL) 500 MG tablet      Other Visit Diagnoses     Hospital discharge follow-up    -  Primary          Outpatient Encounter Prescriptions as of 4/20/2017   Medication Sig Dispense Refill   • acetaminophen (TYLENOL) 325 MG tablet Take 650 mg by mouth Every 6 (Six) Hours As Needed for Mild Pain (1-3).     • amoxicillin (AMOXIL) 500 MG tablet Take 500 mg by mouth.     • Calcium Carb-Cholecalciferol (CALCIUM + D3 PO) Take  by mouth.     • citalopram (CeleXA) 20 MG tablet Take 1 tablet by mouth Daily for 180 days. 90 tablet 1   • clopidogrel (PLAVIX) 75 MG tablet Take 1 tablet by mouth Daily for 180 days. 90 tablet 1   • diphenhydrAMINE-acetaminophen (TYLENOL PM)  MG tablet per tablet Take 1 tablet by mouth Daily.     • docusate sodium 100 MG capsule Take 100 mg by mouth.     • ferrous gluconate (FERGON) 324 MG tablet Take 324 mg by mouth.     • levothyroxine (SYNTHROID, LEVOTHROID) 100 MCG tablet Take 100 mcg by mouth Daily.     • lisinopril (PRINIVIL,ZESTRIL) 5 MG tablet Take 1 tablet by mouth Daily for 180 days. 90 tablet 1   • Multiple Vitamin (MULTIVITAMIN) tablet Take 1 tablet by mouth daily.     • oxybutynin XL (DITROPAN-XL) 10 MG 24 hr tablet Take 1 tablet by mouth Daily for 180 days. 90 tablet 1   • [DISCONTINUED] clopidogrel (PLAVIX) 75 MG tablet Take 75 mg by mouth.     • [DISCONTINUED] estrogens, conjugated, (PREMARIN) 0.625 MG tablet Take 1 tablet by mouth Daily for 180 days. 90 tablet 1   • [DISCONTINUED] levothyroxine (SYNTHROID, LEVOTHROID) 112 MCG tablet Take 100 mcg by mouth Daily.     • [DISCONTINUED] acetaminophen (TYLENOL) 500 MG tablet Take 500 mg by mouth every 6 (six) hours as needed for mild pain (1-3).     • [DISCONTINUED] aspirin 81 MG tablet Take 81 mg by mouth daily.       No facility-administered encounter medications on file as of 4/20/2017.        Orders Placed This  Encounter   Procedures   • Urine Culture       Continue with current treatment plan.         F/U in 4 months

## 2017-04-26 ENCOUNTER — APPOINTMENT (OUTPATIENT)
Dept: WOMENS IMAGING | Facility: HOSPITAL | Age: 77
End: 2017-04-26

## 2017-04-26 LAB
APPEARANCE UR: CLEAR
BACTERIA #/AREA URNS HPF: NORMAL /HPF
BACTERIA UR CULT: NO GROWTH
BACTERIA UR CULT: NORMAL
BILIRUB UR QL STRIP: NEGATIVE
CASTS URNS MICRO: NORMAL
COLOR UR: YELLOW
EPI CELLS #/AREA URNS HPF: NORMAL /HPF
GLUCOSE UR QL: NEGATIVE
HGB UR QL STRIP: NEGATIVE
KETONES UR QL STRIP: NEGATIVE
LEUKOCYTE ESTERASE UR QL STRIP: NEGATIVE
NITRITE UR QL STRIP: NEGATIVE
PH UR STRIP: 6.5 [PH] (ref 5–8)
PROT UR QL STRIP: NEGATIVE
RBC #/AREA URNS HPF: NORMAL /HPF
SP GR UR: 1.01 (ref 1–1.03)
UROBILINOGEN UR STRIP-MCNC: (no result) MG/DL
WBC #/AREA URNS HPF: NORMAL /HPF

## 2017-04-26 PROCEDURE — 77063 BREAST TOMOSYNTHESIS BI: CPT | Performed by: RADIOLOGY

## 2017-04-26 PROCEDURE — G0202 SCR MAMMO BI INCL CAD: HCPCS | Performed by: RADIOLOGY

## 2017-04-26 NOTE — PROGRESS NOTES
I have reviewed your recent test results and have found them to be within normal limits.  Please continue with current recommendations as discussed at our last visit. Please keep follow up schedule with me as set up.  Please call if you have any questions                                                             Dr. Appiah

## 2017-05-03 ENCOUNTER — TELEPHONE (OUTPATIENT)
Dept: FAMILY MEDICINE CLINIC | Facility: CLINIC | Age: 77
End: 2017-05-03

## 2017-05-03 DIAGNOSIS — D50.8 OTHER IRON DEFICIENCY ANEMIA: Primary | ICD-10-CM

## 2017-05-03 RX ORDER — DOXYCYCLINE HYCLATE 50 MG/1
324 CAPSULE, GELATIN COATED ORAL
Qty: 90 TABLET | Refills: 1 | Status: SHIPPED | OUTPATIENT
Start: 2017-05-03 | End: 2017-05-23 | Stop reason: SDUPTHER

## 2017-05-22 DIAGNOSIS — F32.A DEPRESSION: ICD-10-CM

## 2017-05-22 DIAGNOSIS — I10 ESSENTIAL HYPERTENSION: ICD-10-CM

## 2017-05-22 DIAGNOSIS — F43.21 GRIEF REACTION: ICD-10-CM

## 2017-05-22 DIAGNOSIS — R32 URINARY INCONTINENCE: ICD-10-CM

## 2017-05-22 RX ORDER — LISINOPRIL 5 MG/1
TABLET ORAL
Qty: 30 TABLET | Refills: 0 | OUTPATIENT
Start: 2017-05-22

## 2017-05-22 RX ORDER — CITALOPRAM 20 MG/1
TABLET ORAL
Qty: 30 TABLET | Refills: 0 | OUTPATIENT
Start: 2017-05-22

## 2017-05-22 RX ORDER — OXYBUTYNIN CHLORIDE 10 MG/1
TABLET, EXTENDED RELEASE ORAL
Qty: 30 TABLET | Refills: 0 | OUTPATIENT
Start: 2017-05-22

## 2017-05-23 ENCOUNTER — TELEPHONE (OUTPATIENT)
Dept: FAMILY MEDICINE CLINIC | Facility: CLINIC | Age: 77
End: 2017-05-23

## 2017-05-23 DIAGNOSIS — F43.21 GRIEF REACTION: ICD-10-CM

## 2017-05-23 DIAGNOSIS — D50.8 OTHER IRON DEFICIENCY ANEMIA: ICD-10-CM

## 2017-05-23 DIAGNOSIS — I10 ESSENTIAL HYPERTENSION: ICD-10-CM

## 2017-05-23 DIAGNOSIS — G45.9 TRANSIENT CEREBRAL ISCHEMIA, UNSPECIFIED TYPE: ICD-10-CM

## 2017-05-23 DIAGNOSIS — R32 URINARY INCONTINENCE, UNSPECIFIED TYPE: ICD-10-CM

## 2017-05-23 DIAGNOSIS — F33.42 RECURRENT MAJOR DEPRESSIVE DISORDER, IN FULL REMISSION (HCC): ICD-10-CM

## 2017-05-23 RX ORDER — OXYBUTYNIN CHLORIDE 10 MG/1
10 TABLET, EXTENDED RELEASE ORAL DAILY
Qty: 90 TABLET | Refills: 1 | Status: SHIPPED | OUTPATIENT
Start: 2017-05-23 | End: 2017-08-30 | Stop reason: SDUPTHER

## 2017-05-23 RX ORDER — LISINOPRIL 5 MG/1
5 TABLET ORAL DAILY
Qty: 14 TABLET | Refills: 0 | Status: SHIPPED | OUTPATIENT
Start: 2017-05-23 | End: 2017-05-23 | Stop reason: SDUPTHER

## 2017-05-23 RX ORDER — LISINOPRIL 5 MG/1
5 TABLET ORAL DAILY
Qty: 90 TABLET | Refills: 1 | Status: SHIPPED | OUTPATIENT
Start: 2017-05-23 | End: 2017-08-30 | Stop reason: SDUPTHER

## 2017-05-23 RX ORDER — CLOPIDOGREL BISULFATE 75 MG/1
75 TABLET ORAL DAILY
Qty: 14 TABLET | Refills: 0 | Status: SHIPPED | OUTPATIENT
Start: 2017-05-23 | End: 2017-05-23 | Stop reason: SDUPTHER

## 2017-05-23 RX ORDER — DOXYCYCLINE HYCLATE 50 MG/1
324 CAPSULE, GELATIN COATED ORAL
Qty: 14 TABLET | Refills: 0 | Status: SHIPPED | OUTPATIENT
Start: 2017-05-23 | End: 2017-05-23 | Stop reason: SDUPTHER

## 2017-05-23 RX ORDER — OXYBUTYNIN CHLORIDE 10 MG/1
10 TABLET, EXTENDED RELEASE ORAL DAILY
Qty: 14 TABLET | Refills: 0 | Status: SHIPPED | OUTPATIENT
Start: 2017-05-23 | End: 2017-05-23 | Stop reason: SDUPTHER

## 2017-05-23 RX ORDER — CITALOPRAM 20 MG/1
20 TABLET ORAL DAILY
Qty: 14 TABLET | Refills: 0 | Status: SHIPPED | OUTPATIENT
Start: 2017-05-23 | End: 2017-05-23 | Stop reason: SDUPTHER

## 2017-05-23 RX ORDER — CLOPIDOGREL BISULFATE 75 MG/1
75 TABLET ORAL DAILY
Qty: 90 TABLET | Refills: 1 | Status: SHIPPED | OUTPATIENT
Start: 2017-05-23 | End: 2017-08-30 | Stop reason: SDUPTHER

## 2017-05-23 RX ORDER — DOXYCYCLINE HYCLATE 50 MG/1
324 CAPSULE, GELATIN COATED ORAL
Qty: 90 TABLET | Refills: 1 | Status: SHIPPED | OUTPATIENT
Start: 2017-05-23 | End: 2017-08-30 | Stop reason: SDUPTHER

## 2017-05-23 RX ORDER — CITALOPRAM 20 MG/1
20 TABLET ORAL DAILY
Qty: 90 TABLET | Refills: 1 | Status: SHIPPED | OUTPATIENT
Start: 2017-05-23 | End: 2017-08-30 | Stop reason: SDUPTHER

## 2017-06-29 ENCOUNTER — TELEPHONE (OUTPATIENT)
Dept: FAMILY MEDICINE CLINIC | Facility: CLINIC | Age: 77
End: 2017-06-29

## 2017-07-29 ENCOUNTER — RESULTS ENCOUNTER (OUTPATIENT)
Dept: FAMILY MEDICINE CLINIC | Facility: CLINIC | Age: 77
End: 2017-07-29

## 2017-07-29 DIAGNOSIS — I10 ESSENTIAL HYPERTENSION: ICD-10-CM

## 2017-07-29 DIAGNOSIS — R32 URINARY INCONTINENCE, UNSPECIFIED TYPE: ICD-10-CM

## 2017-07-29 DIAGNOSIS — F33.42 RECURRENT MAJOR DEPRESSIVE DISORDER, IN FULL REMISSION (HCC): ICD-10-CM

## 2017-07-29 DIAGNOSIS — Z13.6 SCREENING FOR ISCHEMIC HEART DISEASE: ICD-10-CM

## 2017-08-16 LAB
ALBUMIN SERPL-MCNC: 4.4 G/DL (ref 3.5–5.2)
ALBUMIN/GLOB SERPL: 1.6 G/DL
ALP SERPL-CCNC: 55 U/L (ref 39–117)
ALT SERPL-CCNC: 23 U/L (ref 1–33)
AST SERPL-CCNC: 29 U/L (ref 1–32)
BASOPHILS # BLD AUTO: 0.02 10*3/MM3 (ref 0–0.2)
BASOPHILS NFR BLD AUTO: 0.4 % (ref 0–1.5)
BILIRUB SERPL-MCNC: 0.3 MG/DL (ref 0.1–1.2)
BUN SERPL-MCNC: 20 MG/DL (ref 8–23)
BUN/CREAT SERPL: 18.5 (ref 7–25)
CALCIUM SERPL-MCNC: 10.1 MG/DL (ref 8.6–10.5)
CHLORIDE SERPL-SCNC: 95 MMOL/L (ref 98–107)
CHOLEST SERPL-MCNC: 219 MG/DL (ref 0–200)
CO2 SERPL-SCNC: 26.8 MMOL/L (ref 22–29)
CREAT SERPL-MCNC: 1.08 MG/DL (ref 0.57–1)
EOSINOPHIL # BLD AUTO: 0.03 10*3/MM3 (ref 0–0.7)
EOSINOPHIL NFR BLD AUTO: 0.6 % (ref 0.3–6.2)
ERYTHROCYTE [DISTWIDTH] IN BLOOD BY AUTOMATED COUNT: 13.8 % (ref 11.7–13)
GLOBULIN SER CALC-MCNC: 2.8 GM/DL
GLUCOSE SERPL-MCNC: 96 MG/DL (ref 65–99)
HCT VFR BLD AUTO: 37.1 % (ref 35.6–45.5)
HDLC SERPL-MCNC: 76 MG/DL (ref 40–60)
HGB BLD-MCNC: 11.6 G/DL (ref 11.9–15.5)
IMM GRANULOCYTES # BLD: 0.04 10*3/MM3 (ref 0–0.03)
IMM GRANULOCYTES NFR BLD: 0.8 % (ref 0–0.5)
LDLC SERPL CALC-MCNC: 120 MG/DL (ref 0–100)
LDLC/HDLC SERPL: 1.58 {RATIO}
LYMPHOCYTES # BLD AUTO: 2.14 10*3/MM3 (ref 0.9–4.8)
LYMPHOCYTES NFR BLD AUTO: 42.6 % (ref 19.6–45.3)
MCH RBC QN AUTO: 30.3 PG (ref 26.9–32)
MCHC RBC AUTO-ENTMCNC: 31.3 G/DL (ref 32.4–36.3)
MCV RBC AUTO: 96.9 FL (ref 80.5–98.2)
MONOCYTES # BLD AUTO: 0.54 10*3/MM3 (ref 0.2–1.2)
MONOCYTES NFR BLD AUTO: 10.8 % (ref 5–12)
NEUTROPHILS # BLD AUTO: 2.25 10*3/MM3 (ref 1.9–8.1)
NEUTROPHILS NFR BLD AUTO: 44.8 % (ref 42.7–76)
PLATELET # BLD AUTO: 215 10*3/MM3 (ref 140–500)
POTASSIUM SERPL-SCNC: 5 MMOL/L (ref 3.5–5.2)
PROT SERPL-MCNC: 7.2 G/DL (ref 6–8.5)
RBC # BLD AUTO: 3.83 10*6/MM3 (ref 3.9–5.2)
SODIUM SERPL-SCNC: 137 MMOL/L (ref 136–145)
TRIGL SERPL-MCNC: 113 MG/DL (ref 0–150)
TSH SERPL DL<=0.005 MIU/L-ACNC: 0.99 MIU/ML (ref 0.27–4.2)
VLDLC SERPL CALC-MCNC: 22.6 MG/DL (ref 5–40)
WBC # BLD AUTO: 5.02 10*3/MM3 (ref 4.5–10.7)

## 2017-08-29 PROBLEM — K90.9 INTESTINAL MALABSORPTION: Status: ACTIVE | Noted: 2017-06-28

## 2017-08-29 PROBLEM — R60.0 LEG EDEMA, LEFT: Status: ACTIVE | Noted: 2017-07-10

## 2017-08-29 PROBLEM — I99.9 VASCULAR ABNORMALITY: Status: ACTIVE | Noted: 2017-07-11

## 2017-08-29 PROBLEM — L81.9 DISCOLORATION OF SKIN OF LOWER LEG: Status: ACTIVE | Noted: 2017-07-10

## 2017-08-30 ENCOUNTER — OFFICE VISIT (OUTPATIENT)
Dept: FAMILY MEDICINE CLINIC | Facility: CLINIC | Age: 77
End: 2017-08-30

## 2017-08-30 VITALS
TEMPERATURE: 98 F | HEART RATE: 58 BPM | RESPIRATION RATE: 16 BRPM | BODY MASS INDEX: 19.69 KG/M2 | HEIGHT: 62 IN | DIASTOLIC BLOOD PRESSURE: 73 MMHG | SYSTOLIC BLOOD PRESSURE: 126 MMHG | WEIGHT: 107 LBS

## 2017-08-30 DIAGNOSIS — F43.21 GRIEF REACTION: ICD-10-CM

## 2017-08-30 DIAGNOSIS — G45.9 TRANSIENT CEREBRAL ISCHEMIA, UNSPECIFIED TYPE: ICD-10-CM

## 2017-08-30 DIAGNOSIS — R32 URINARY INCONTINENCE, UNSPECIFIED TYPE: ICD-10-CM

## 2017-08-30 DIAGNOSIS — F33.42 RECURRENT MAJOR DEPRESSIVE DISORDER, IN FULL REMISSION (HCC): ICD-10-CM

## 2017-08-30 DIAGNOSIS — D50.8 OTHER IRON DEFICIENCY ANEMIA: ICD-10-CM

## 2017-08-30 DIAGNOSIS — I10 ESSENTIAL HYPERTENSION: ICD-10-CM

## 2017-08-30 PROBLEM — K63.5 COLON POLYP: Status: ACTIVE | Noted: 2017-07-26

## 2017-08-30 PROBLEM — K31.819 GASTRIC AVM: Status: ACTIVE | Noted: 2017-07-26

## 2017-08-30 PROCEDURE — 99214 OFFICE O/P EST MOD 30 MIN: CPT | Performed by: FAMILY MEDICINE

## 2017-08-30 RX ORDER — OXYBUTYNIN CHLORIDE 10 MG/1
10 TABLET, EXTENDED RELEASE ORAL DAILY
Qty: 90 TABLET | Refills: 1 | Status: SHIPPED | OUTPATIENT
Start: 2017-08-30 | End: 2018-03-05

## 2017-08-30 RX ORDER — CITALOPRAM 20 MG/1
20 TABLET ORAL DAILY
Qty: 90 TABLET | Refills: 1 | Status: SHIPPED | OUTPATIENT
Start: 2017-08-30 | End: 2018-03-05 | Stop reason: SDUPTHER

## 2017-08-30 RX ORDER — DOXYCYCLINE HYCLATE 50 MG/1
324 CAPSULE, GELATIN COATED ORAL
Qty: 90 TABLET | Refills: 1 | Status: SHIPPED | OUTPATIENT
Start: 2017-08-30 | End: 2018-03-05 | Stop reason: SDUPTHER

## 2017-08-30 RX ORDER — LISINOPRIL 5 MG/1
5 TABLET ORAL DAILY
Qty: 90 TABLET | Refills: 1 | Status: SHIPPED | OUTPATIENT
Start: 2017-08-30 | End: 2018-03-05 | Stop reason: SDUPTHER

## 2017-08-30 RX ORDER — CLOPIDOGREL BISULFATE 75 MG/1
TABLET ORAL
Qty: 30 TABLET | Refills: 1 | Status: SHIPPED | OUTPATIENT
Start: 2017-08-30 | End: 2018-03-05 | Stop reason: SDUPTHER

## 2017-08-30 NOTE — PROGRESS NOTES
"Chief Complaint   Patient presents with   • Follow-up       Subjective   This patient presents the office follow-up on recent events and review labs.  Her blood pressure is controlled.  Depression is stable with citalopram.  She continues to take Plavix but every third day on the advice of her vascular surgeon.  No side effects are reported with that dosing regimen.  She takes that medication because of her history of TIA in the past.  Anemia is improved.  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  She also had a diminutive colonic polyp.  Next colonoscopy due in 5 years and health maintenance topic has been updated.  Most recent labs have shown improvement in her hemoglobin.  She continues to take iron once daily.  Medications have been updated during today's visit.  She remains under the care of endocrinology for her thyroid.  Multiple visits with hematology and her gastric surgeon have been reviewed during today's office visit.  I have reviewed and updated her medications, medical history and problem list during today's office visit.        Social History   Substance Use Topics   • Smoking status: Former Smoker   • Smokeless tobacco: Never Used   • Alcohol use Yes       Review of Systems   Constitutional: Negative for fatigue.   Cardiovascular: Negative for chest pain.   Neurological: Positive for numbness (intermittant left hand, during the night. goes away for a few moments).       Objective   /73  Pulse 58  Temp 98 °F (36.7 °C) (Oral)   Resp 16  Ht 62\" (157.5 cm)  Wt 107 lb (48.5 kg)  BMI 19.57 kg/m2  Body mass index is 19.57 kg/(m^2).  Physical Exam   Constitutional: She is cooperative. No distress.   Eyes: Conjunctivae and lids are normal.   Neck: Carotid bruit is not present. No tracheal deviation present.   Cardiovascular: Normal rate, regular rhythm and normal heart sounds.    No murmur " heard.  Pulmonary/Chest: Effort normal and breath sounds normal.   Neurological: She is alert. She is not disoriented.   Skin: Skin is warm and dry.   Psychiatric: She has a normal mood and affect. Her speech is normal and behavior is normal.   Vitals reviewed.      Data Reviewed:    CMP:  Lab Results   Component Value Date    GLU 96 08/16/2017    BUN 20 08/16/2017    CREATININE 1.08 (H) 08/16/2017    EGFRIFNONA 49 (L) 08/16/2017    EGFRIFAFRI 60 (L) 08/16/2017     08/16/2017    K 5.0 08/16/2017    CL 95 (L) 08/16/2017    CALCIUM 10.1 08/16/2017    PROTENTOTREF 7.2 08/16/2017    ALBUMIN 4.40 08/16/2017    LABGLOBREF 2.8 08/16/2017    BILITOT 0.3 08/16/2017    ALKPHOS 55 08/16/2017    AST 29 08/16/2017    ALT 23 08/16/2017     CBC w/ diff:   Lab Results   Component Value Date    WBC 5.02 08/16/2017    RBC 3.83 (L) 08/16/2017    HGB 11.6 (L) 08/16/2017    HCT 37.1 08/16/2017    MCV 96.9 08/16/2017    MCH 30.3 08/16/2017    MCHC 31.3 (L) 08/16/2017    RDW 13.8 (H) 08/16/2017     08/16/2017    NEUTRORELPCT 44.8 08/16/2017    LYMPHORELPCT 42.6 08/16/2017    MONORELPCT 10.8 08/16/2017    EOSRELPCT 0.6 08/16/2017    BASORELPCT 0.4 08/16/2017     LIPID PANEL:  Lab Results   Component Value Date    CHLPL 219 (H) 08/16/2017    TRIG 113 08/16/2017    HDL 76 (H) 08/16/2017    VLDL 22.6 08/16/2017     (H) 08/16/2017    LDLHDL 1.58 08/16/2017     TSH:  Lab Results   Component Value Date    TSH 0.989 08/16/2017       Assessment/Plan     Problem List Items Addressed This Visit        Cardiovascular and Mediastinum    Essential hypertension    Relevant Medications    lisinopril (PRINIVIL,ZESTRIL) 5 MG tablet    TIA (transient ischemic attack)    Relevant Medications    clopidogrel (PLAVIX) 75 MG tablet       Hematopoietic and Hemostatic    Iron deficiency anemia    Relevant Medications    ferrous gluconate (FERGON) 324 MG tablet       Other    Grief reaction    Relevant Medications    citalopram (CeleXA) 20 MG  tablet    Depression    Relevant Medications    citalopram (CeleXA) 20 MG tablet    Urinary incontinence    Relevant Medications    oxybutynin XL (DITROPAN-XL) 10 MG 24 hr tablet          Outpatient Encounter Prescriptions as of 8/30/2017   Medication Sig Dispense Refill   • acetaminophen (TYLENOL) 325 MG tablet Take 650 mg by mouth Every 6 (Six) Hours As Needed for Mild Pain (1-3).     • Calcium Carb-Cholecalciferol (CALCIUM + D3 PO) Take  by mouth.     • citalopram (CeleXA) 20 MG tablet Take 1 tablet by mouth Daily. 90 tablet 1   • clopidogrel (PLAVIX) 75 MG tablet Take 1 po every 3rd day 30 tablet 1   • diphenhydrAMINE-acetaminophen (TYLENOL PM)  MG tablet per tablet Take 1 tablet by mouth Daily.     • docusate sodium 100 MG capsule Take 100 mg by mouth.     • ferrous gluconate (FERGON) 324 MG tablet Take 1 tablet by mouth Daily With Breakfast. 90 tablet 1   • levothyroxine (SYNTHROID, LEVOTHROID) 100 MCG tablet Take 100 mcg by mouth Daily.     • lisinopril (PRINIVIL,ZESTRIL) 5 MG tablet Take 1 tablet by mouth Daily. 90 tablet 1   • Multiple Vitamin (MULTIVITAMIN) tablet Take 1 tablet by mouth daily.     • oxybutynin XL (DITROPAN-XL) 10 MG 24 hr tablet Take 1 tablet by mouth Daily. 90 tablet 1   • [DISCONTINUED] citalopram (CeleXA) 20 MG tablet Take 1 tablet by mouth Daily. 90 tablet 1   • [DISCONTINUED] clopidogrel (PLAVIX) 75 MG tablet Take 1 tablet by mouth Daily. (Patient taking differently: Take 75 mg by mouth Daily. Take 1 every 3rd day) 90 tablet 1   • [DISCONTINUED] ferrous gluconate (FERGON) 324 MG tablet Take 1 tablet by mouth Daily With Breakfast. 90 tablet 1   • [DISCONTINUED] lisinopril (PRINIVIL,ZESTRIL) 5 MG tablet Take 1 tablet by mouth Daily. 90 tablet 1   • [DISCONTINUED] oxybutynin XL (DITROPAN-XL) 10 MG 24 hr tablet Take 1 tablet by mouth Daily. 90 tablet 1     No facility-administered encounter medications on file as of 8/30/2017.        No orders of the defined types were placed in  this encounter.      Continue with current treatment plan.         F/U in 6 months

## 2018-01-16 RX ORDER — DOXYCYCLINE HYCLATE 50 MG/1
CAPSULE, GELATIN COATED ORAL
Qty: 90 TABLET | Refills: 1 | Status: SHIPPED | OUTPATIENT
Start: 2018-01-16 | End: 2019-01-28

## 2018-02-05 RX ORDER — CITALOPRAM 20 MG/1
TABLET ORAL
Qty: 90 TABLET | Refills: 1 | OUTPATIENT
Start: 2018-02-05

## 2018-02-05 RX ORDER — OXYBUTYNIN CHLORIDE 10 MG/1
TABLET, EXTENDED RELEASE ORAL
Qty: 90 TABLET | Refills: 1 | OUTPATIENT
Start: 2018-02-05

## 2018-02-05 RX ORDER — LISINOPRIL 5 MG/1
TABLET ORAL
Qty: 90 TABLET | Refills: 1 | OUTPATIENT
Start: 2018-02-05

## 2018-02-27 RX ORDER — CLOPIDOGREL BISULFATE 75 MG/1
TABLET ORAL
Qty: 30 TABLET | Refills: 1 | OUTPATIENT
Start: 2018-02-27

## 2018-03-05 ENCOUNTER — OFFICE VISIT (OUTPATIENT)
Dept: FAMILY MEDICINE CLINIC | Facility: CLINIC | Age: 78
End: 2018-03-05

## 2018-03-05 VITALS
RESPIRATION RATE: 16 BRPM | HEART RATE: 56 BPM | TEMPERATURE: 97 F | HEIGHT: 62 IN | BODY MASS INDEX: 21.16 KG/M2 | SYSTOLIC BLOOD PRESSURE: 164 MMHG | DIASTOLIC BLOOD PRESSURE: 75 MMHG | WEIGHT: 115 LBS

## 2018-03-05 DIAGNOSIS — R32 URINARY INCONTINENCE, UNSPECIFIED TYPE: ICD-10-CM

## 2018-03-05 DIAGNOSIS — Z13.6 SCREENING FOR ISCHEMIC HEART DISEASE: ICD-10-CM

## 2018-03-05 DIAGNOSIS — F33.42 RECURRENT MAJOR DEPRESSIVE DISORDER, IN FULL REMISSION (HCC): ICD-10-CM

## 2018-03-05 DIAGNOSIS — F43.21 GRIEF REACTION: ICD-10-CM

## 2018-03-05 DIAGNOSIS — I10 ESSENTIAL HYPERTENSION: Primary | ICD-10-CM

## 2018-03-05 DIAGNOSIS — G45.9 TRANSIENT CEREBRAL ISCHEMIA, UNSPECIFIED TYPE: ICD-10-CM

## 2018-03-05 DIAGNOSIS — D50.8 OTHER IRON DEFICIENCY ANEMIA: ICD-10-CM

## 2018-03-05 PROBLEM — B96.20 E. COLI UTI: Status: RESOLVED | Noted: 2017-04-20 | Resolved: 2018-03-05

## 2018-03-05 PROBLEM — R60.0 LEG EDEMA, LEFT: Status: RESOLVED | Noted: 2017-07-10 | Resolved: 2018-03-05

## 2018-03-05 PROBLEM — L81.9 DISCOLORATION OF SKIN OF LOWER LEG: Status: RESOLVED | Noted: 2017-07-10 | Resolved: 2018-03-05

## 2018-03-05 PROBLEM — N39.0 E. COLI UTI: Status: RESOLVED | Noted: 2017-04-20 | Resolved: 2018-03-05

## 2018-03-05 PROCEDURE — 99214 OFFICE O/P EST MOD 30 MIN: CPT | Performed by: FAMILY MEDICINE

## 2018-03-05 RX ORDER — LISINOPRIL 10 MG/1
10 TABLET ORAL DAILY
Qty: 90 TABLET | Refills: 1 | Status: SHIPPED | OUTPATIENT
Start: 2018-03-05 | End: 2018-08-08 | Stop reason: ALTCHOICE

## 2018-03-05 RX ORDER — DOXYCYCLINE HYCLATE 50 MG/1
324 CAPSULE, GELATIN COATED ORAL
Qty: 90 TABLET | Refills: 1 | Status: SHIPPED | OUTPATIENT
Start: 2018-03-05 | End: 2019-01-28

## 2018-03-05 RX ORDER — LEVOTHYROXINE SODIUM 100 UG/1
100 CAPSULE ORAL DAILY
COMMUNITY
End: 2019-07-01 | Stop reason: SDUPTHER

## 2018-03-05 RX ORDER — CITALOPRAM 20 MG/1
20 TABLET ORAL DAILY
Qty: 90 TABLET | Refills: 1 | Status: SHIPPED | OUTPATIENT
Start: 2018-03-05 | End: 2018-08-08 | Stop reason: SDUPTHER

## 2018-03-05 RX ORDER — CLOPIDOGREL BISULFATE 75 MG/1
TABLET ORAL
Qty: 30 TABLET | Refills: 1 | Status: SHIPPED | OUTPATIENT
Start: 2018-03-05 | End: 2018-08-08 | Stop reason: SDUPTHER

## 2018-03-05 NOTE — PROGRESS NOTES
Chief Complaint   Patient presents with   • Hypertension       Subjective     Sandy presents to the office today to refill her medications. No medication side effects are reported.  Her blood pressure is elevated.  We will increase lisinopril to 10 mg daily.  We will have short-term follow-up to recheck blood pressure and discuss lab results.  Depression is controlled on current therapy.  She continues to take clopidogrel every third day for her history of TIA.  Urinary incontinence is progressively worsening and oxybutynin is not effective.  We will discontinue that medicine and created an order to see urology for this condition.  She continues to have some constipation issues.  If her ferritin and if iron levels are normal we will discontinue her ferrous gluconate and see if this helps that condition.  For now, she will continue to use docusate as she has been doing.  She remains under the care of endocrinology for her thyroid condition which is stable.    I have reviewed and updated her medications, medical history and problem list during today's office visit.     Assessment/Plan   Problem List Items Addressed This Visit        Cardiovascular and Mediastinum    Essential hypertension - Primary    Relevant Medications    lisinopril (PRINIVIL,ZESTRIL) 10 MG tablet    Other Relevant Orders    Comprehensive metabolic panel    CBC and Differential    TSH    TIA (transient ischemic attack)    Relevant Medications    clopidogrel (PLAVIX) 75 MG tablet       Hematopoietic and Hemostatic    Iron deficiency anemia    Relevant Medications    ferrous gluconate (FERGON) 324 MG tablet    Other Relevant Orders    Iron Profile    Ferritin       Other    Grief reaction    Relevant Medications    citalopram (CeleXA) 20 MG tablet    Depression    Relevant Medications    citalopram (CeleXA) 20 MG tablet    Urinary incontinence    Relevant Orders    Ambulatory Referral to Urology      Other Visit Diagnoses     Screening for ischemic  "heart disease        Relevant Orders    Lipid Panel With LDL/HDL Ratio        F/U in one month for regular visit and Medicare Wellness Visit       Review of Systems   Gastrointestinal: Positive for constipation.   Genitourinary:        See HPI     Objective   /75  Pulse 56  Temp 97 °F (36.1 °C) (Oral)   Resp 16  Ht 157.5 cm (62\")  Wt 52.2 kg (115 lb)  BMI 21.03 kg/m2  Body mass index is 21.03 kg/(m^2).  Physical Exam   Constitutional: She is cooperative. No distress.   Eyes: Conjunctivae and lids are normal.   Neck: Carotid bruit is not present. No tracheal deviation present.   Cardiovascular: Normal rate, regular rhythm and normal heart sounds.    No murmur heard.  Pulmonary/Chest: Effort normal and breath sounds normal.   Neurological: She is alert. She is not disoriented.   Skin: Skin is warm and dry.   Psychiatric: She has a normal mood and affect. Her speech is normal and behavior is normal.   Vitals reviewed.       Social History   Substance Use Topics   • Smoking status: Former Smoker   • Smokeless tobacco: Never Used   • Alcohol use Yes       Data Reviewed:        Orders Placed This Encounter   Procedures   • Comprehensive metabolic panel   • Lipid Panel With LDL/HDL Ratio   • TSH   • Iron Profile   • Ferritin   • Ambulatory Referral to Urology     Referral Priority:   Routine     Referral Type:   Consultation     Referral Reason:   Specialty Services Required     Requested Specialty:   Urology     Number of Visits Requested:   1   • CBC and Differential     Order Specific Question:   Manual Differential     Answer:   No       Outpatient Encounter Prescriptions as of 3/5/2018   Medication Sig Dispense Refill   • acetaminophen (TYLENOL) 325 MG tablet Take 650 mg by mouth Every 6 (Six) Hours As Needed for Mild Pain (1-3).     • Calcium Carb-Cholecalciferol (CALCIUM + D3 PO) Take  by mouth.     • citalopram (CeleXA) 20 MG tablet Take 1 tablet by mouth Daily. 90 tablet 1   • clopidogrel (PLAVIX) 75 " MG tablet Take 1 po every 3rd day 30 tablet 1   • docusate sodium 100 MG capsule Take 100 mg by mouth.     • ferrous gluconate (FERGON) 324 MG tablet Take 1 tablet by mouth Daily With Breakfast. 90 tablet 1   • levothyroxine (SYNTHROID, LEVOTHROID) 88 MCG tablet Take 88 mcg by mouth Daily.     • lisinopril (PRINIVIL,ZESTRIL) 10 MG tablet Take 1 tablet by mouth Daily for 180 days. 90 tablet 1   • Multiple Vitamin (MULTIVITAMIN) tablet Take 1 tablet by mouth daily.     • [DISCONTINUED] citalopram (CeleXA) 20 MG tablet Take 1 tablet by mouth Daily. 90 tablet 1   • [DISCONTINUED] clopidogrel (PLAVIX) 75 MG tablet Take 1 po every 3rd day 30 tablet 1   • [DISCONTINUED] diphenhydrAMINE-acetaminophen (TYLENOL PM)  MG tablet per tablet Take 1 tablet by mouth Daily.     • [DISCONTINUED] ferrous gluconate (FERGON) 324 MG tablet Take 1 tablet by mouth Daily With Breakfast. 90 tablet 1   • [DISCONTINUED] levothyroxine (SYNTHROID, LEVOTHROID) 100 MCG tablet Take 100 mcg by mouth Daily.     • [DISCONTINUED] lisinopril (PRINIVIL,ZESTRIL) 5 MG tablet Take 1 tablet by mouth Daily. 90 tablet 1   • [DISCONTINUED] oxybutynin XL (DITROPAN-XL) 10 MG 24 hr tablet Take 1 tablet by mouth Daily. 90 tablet 1     No facility-administered encounter medications on file as of 3/5/2018.

## 2018-03-06 ENCOUNTER — TELEPHONE (OUTPATIENT)
Dept: FAMILY MEDICINE CLINIC | Facility: CLINIC | Age: 78
End: 2018-03-06

## 2018-03-06 DIAGNOSIS — Z12.39 SCREENING FOR BREAST CANCER: Primary | ICD-10-CM

## 2018-03-06 NOTE — TELEPHONE ENCOUNTER
Pt wanted mammo order. I sent that.  She also stated you wanted to know :  Also pt takes Vitamin D 1000 mg and the calcium 300 mg that is in her centrum daily   also take alone Calcium  600mg and the vitamin d3 5,000mg daily

## 2018-03-16 ENCOUNTER — TELEPHONE (OUTPATIENT)
Dept: FAMILY MEDICINE CLINIC | Facility: CLINIC | Age: 78
End: 2018-03-16

## 2018-03-16 NOTE — TELEPHONE ENCOUNTER
PATIENT SEEN [facility]YN[facility] CRENSHAW. SHE IS WANTING PATIENT TO START MYRBETRIQ. DR CRENSHAW INSTRUCTED PATIENT TO VERIFY WITH YOU IT IS OK TO TAKE WITH CURRENT MEDICATIONS

## 2018-03-30 LAB
ALBUMIN SERPL-MCNC: 4.2 G/DL (ref 3.5–5.2)
ALBUMIN/GLOB SERPL: 1.9 G/DL
ALP SERPL-CCNC: 66 U/L (ref 39–117)
ALT SERPL-CCNC: 17 U/L (ref 1–33)
AST SERPL-CCNC: 24 U/L (ref 1–32)
BASOPHILS # BLD AUTO: 0.01 10*3/MM3 (ref 0–0.2)
BASOPHILS NFR BLD AUTO: 0.2 % (ref 0–1.5)
BILIRUB SERPL-MCNC: <0.2 MG/DL (ref 0.1–1.2)
BUN SERPL-MCNC: 25 MG/DL (ref 8–23)
BUN/CREAT SERPL: 26 (ref 7–25)
CALCIUM SERPL-MCNC: 9.4 MG/DL (ref 8.6–10.5)
CHLORIDE SERPL-SCNC: 97 MMOL/L (ref 98–107)
CHOLEST SERPL-MCNC: 194 MG/DL (ref 0–200)
CO2 SERPL-SCNC: 28.8 MMOL/L (ref 22–29)
CREAT SERPL-MCNC: 0.96 MG/DL (ref 0.57–1)
EOSINOPHIL # BLD AUTO: 0.11 10*3/MM3 (ref 0–0.7)
EOSINOPHIL NFR BLD AUTO: 2.6 % (ref 0.3–6.2)
ERYTHROCYTE [DISTWIDTH] IN BLOOD BY AUTOMATED COUNT: 13.6 % (ref 11.7–13)
FERRITIN SERPL-MCNC: 241.8 NG/ML (ref 13–150)
GFR SERPLBLD CREATININE-BSD FMLA CKD-EPI: 57 ML/MIN/1.73
GFR SERPLBLD CREATININE-BSD FMLA CKD-EPI: 68 ML/MIN/1.73
GLOBULIN SER CALC-MCNC: 2.2 GM/DL
GLUCOSE SERPL-MCNC: 88 MG/DL (ref 65–99)
HCT VFR BLD AUTO: 34.2 % (ref 35.6–45.5)
HDLC SERPL-MCNC: 67 MG/DL (ref 40–60)
HGB BLD-MCNC: 10.9 G/DL (ref 11.9–15.5)
IMM GRANULOCYTES # BLD: 0.09 10*3/MM3 (ref 0–0.03)
IMM GRANULOCYTES NFR BLD: 2.1 % (ref 0–0.5)
IRON SATN MFR SERPL: 17 % (ref 20–50)
IRON SERPL-MCNC: 64 MCG/DL (ref 37–145)
LDLC SERPL CALC-MCNC: 111 MG/DL (ref 0–100)
LDLC/HDLC SERPL: 1.66 {RATIO}
LYMPHOCYTES # BLD AUTO: 1.65 10*3/MM3 (ref 0.9–4.8)
LYMPHOCYTES NFR BLD AUTO: 38.8 % (ref 19.6–45.3)
MCH RBC QN AUTO: 30.5 PG (ref 26.9–32)
MCHC RBC AUTO-ENTMCNC: 31.9 G/DL (ref 32.4–36.3)
MCV RBC AUTO: 95.8 FL (ref 80.5–98.2)
MONOCYTES # BLD AUTO: 0.46 10*3/MM3 (ref 0.2–1.2)
MONOCYTES NFR BLD AUTO: 10.8 % (ref 5–12)
NEUTROPHILS # BLD AUTO: 1.93 10*3/MM3 (ref 1.9–8.1)
NEUTROPHILS NFR BLD AUTO: 45.5 % (ref 42.7–76)
PLATELET # BLD AUTO: 204 10*3/MM3 (ref 140–500)
POTASSIUM SERPL-SCNC: 4.6 MMOL/L (ref 3.5–5.2)
PROT SERPL-MCNC: 6.4 G/DL (ref 6–8.5)
RBC # BLD AUTO: 3.57 10*6/MM3 (ref 3.9–5.2)
SODIUM SERPL-SCNC: 137 MMOL/L (ref 136–145)
TIBC SERPL-MCNC: 369 MCG/DL
TRIGL SERPL-MCNC: 79 MG/DL (ref 0–150)
TSH SERPL DL<=0.005 MIU/L-ACNC: 5.32 MIU/ML (ref 0.27–4.2)
UIBC SERPL-MCNC: 305 MCG/DL
VLDLC SERPL CALC-MCNC: 15.8 MG/DL (ref 5–40)
WBC # BLD AUTO: 4.25 10*3/MM3 (ref 4.5–10.7)

## 2018-04-02 ENCOUNTER — OFFICE VISIT (OUTPATIENT)
Dept: FAMILY MEDICINE CLINIC | Facility: CLINIC | Age: 78
End: 2018-04-02

## 2018-04-02 VITALS
DIASTOLIC BLOOD PRESSURE: 71 MMHG | HEART RATE: 53 BPM | HEIGHT: 62 IN | WEIGHT: 112 LBS | SYSTOLIC BLOOD PRESSURE: 133 MMHG | RESPIRATION RATE: 16 BRPM | TEMPERATURE: 97.4 F | BODY MASS INDEX: 20.61 KG/M2

## 2018-04-02 DIAGNOSIS — K59.04 CHRONIC IDIOPATHIC CONSTIPATION: ICD-10-CM

## 2018-04-02 DIAGNOSIS — I10 ESSENTIAL HYPERTENSION: Primary | ICD-10-CM

## 2018-04-02 PROCEDURE — 99213 OFFICE O/P EST LOW 20 MIN: CPT | Performed by: FAMILY MEDICINE

## 2018-04-02 RX ORDER — FESOTERODINE FUMARATE 8 MG/1
8 TABLET, EXTENDED RELEASE ORAL
COMMUNITY
Start: 2018-03-27 | End: 2018-08-08 | Stop reason: SINTOL

## 2018-04-02 NOTE — PROGRESS NOTES
"Chief Complaint   Patient presents with   • Hypertension       Subjective     Sandy presents to the office today to refill her medications. No medication side effects are reported.      I have reviewed and updated her medications, medical history and problem list during today's office visit.     Social History   Substance Use Topics   • Smoking status: Former Smoker   • Smokeless tobacco: Never Used   • Alcohol use Yes     Review of Systems   Constitutional: Negative for fatigue.   Cardiovascular: Negative for chest pain.   Gastrointestinal: Positive for constipation.        Dark stool, on iron, no blood     Objective   /71   Pulse 53   Temp 97.4 °F (36.3 °C) (Oral)   Resp 16   Ht 157.5 cm (62\")   Wt 50.8 kg (112 lb)   BMI 20.49 kg/m²   Body mass index is 20.49 kg/m².  Physical Exam   Constitutional: She is oriented to person, place, and time. She appears well-developed. No distress.   Eyes: Conjunctivae and lids are normal.   Neck: Carotid bruit is not present.   Cardiovascular: Normal rate, regular rhythm and normal heart sounds.    Pulmonary/Chest: Effort normal and breath sounds normal.   Neurological: She is alert and oriented to person, place, and time.   Skin: Skin is warm and dry.   Psychiatric: She has a normal mood and affect. Her behavior is normal.   Vitals reviewed.      Data Reviewed:         Lab Results   Component Value Date    GLU 88 03/29/2018    BUN 25 (H) 03/29/2018    CREATININE 0.96 03/29/2018    EGFRIFNONA 57 (L) 03/29/2018    EGFRIFAFRI 68 03/29/2018     03/29/2018    K 4.6 03/29/2018    CL 97 (L) 03/29/2018    CALCIUM 9.4 03/29/2018    PROTENTOTREF 6.4 03/29/2018    ALBUMIN 4.20 03/29/2018    LABGLOBREF 2.2 03/29/2018    BILITOT <0.2 03/29/2018    ALKPHOS 66 03/29/2018    AST 24 03/29/2018    ALT 17 03/29/2018     CBC w/ diff:   Lab Results   Component Value Date    WBC 4.25 (L) 03/29/2018    RBC 3.57 (L) 03/29/2018    HGB 10.9 (L) 03/29/2018    HCT 34.2 (L) 03/29/2018    " MCV 95.8 03/29/2018    MCH 30.5 03/29/2018    MCHC 31.9 (L) 03/29/2018    RDW 13.6 (H) 03/29/2018     03/29/2018    NEUTRORELPCT 45.5 03/29/2018    LYMPHORELPCT 38.8 03/29/2018    MONORELPCT 10.8 03/29/2018    EOSRELPCT 2.6 03/29/2018    BASORELPCT 0.2 03/29/2018     LIPID PANEL:  Lab Results   Component Value Date    CHLPL 194 03/29/2018    TRIG 79 03/29/2018    HDL 67 (H) 03/29/2018    VLDL 15.8 03/29/2018     (H) 03/29/2018    LDLHDL 1.66 03/29/2018     TSH:  Lab Results   Component Value Date    TSH 5.320 (H) 03/29/2018       Assessment/Plan   Problem List Items Addressed This Visit        Cardiovascular and Mediastinum    Essential hypertension - Primary      Other Visit Diagnoses    None.       RTC in August       No orders of the defined types were placed in this encounter.

## 2018-05-02 ENCOUNTER — APPOINTMENT (OUTPATIENT)
Dept: WOMENS IMAGING | Facility: HOSPITAL | Age: 78
End: 2018-05-02

## 2018-05-02 PROCEDURE — 77063 BREAST TOMOSYNTHESIS BI: CPT | Performed by: RADIOLOGY

## 2018-05-02 PROCEDURE — 77067 SCR MAMMO BI INCL CAD: CPT | Performed by: RADIOLOGY

## 2018-07-26 ENCOUNTER — TELEPHONE (OUTPATIENT)
Dept: FAMILY MEDICINE CLINIC | Facility: CLINIC | Age: 78
End: 2018-07-26

## 2018-07-26 DIAGNOSIS — H91.90 HEARING LOSS, UNSPECIFIED HEARING LOSS TYPE, UNSPECIFIED LATERALITY: Primary | ICD-10-CM

## 2018-08-02 RX ORDER — CITALOPRAM 20 MG/1
TABLET ORAL
Qty: 90 TABLET | Refills: 1 | OUTPATIENT
Start: 2018-08-02

## 2018-08-02 RX ORDER — LISINOPRIL 10 MG/1
TABLET ORAL
Qty: 90 TABLET | Refills: 1 | OUTPATIENT
Start: 2018-08-02

## 2018-08-02 RX ORDER — CLOPIDOGREL BISULFATE 75 MG/1
TABLET ORAL
Qty: 30 TABLET | Refills: 1 | OUTPATIENT
Start: 2018-08-02

## 2018-08-08 ENCOUNTER — OFFICE VISIT (OUTPATIENT)
Dept: FAMILY MEDICINE CLINIC | Facility: CLINIC | Age: 78
End: 2018-08-08

## 2018-08-08 VITALS
TEMPERATURE: 97.8 F | HEART RATE: 54 BPM | HEIGHT: 62 IN | SYSTOLIC BLOOD PRESSURE: 150 MMHG | BODY MASS INDEX: 20.61 KG/M2 | OXYGEN SATURATION: 97 % | DIASTOLIC BLOOD PRESSURE: 71 MMHG | RESPIRATION RATE: 16 BRPM | WEIGHT: 112 LBS

## 2018-08-08 DIAGNOSIS — H91.93 HEARING DECREASED, BILATERAL: ICD-10-CM

## 2018-08-08 DIAGNOSIS — I10 ESSENTIAL HYPERTENSION: Primary | ICD-10-CM

## 2018-08-08 DIAGNOSIS — F33.42 RECURRENT MAJOR DEPRESSIVE DISORDER, IN FULL REMISSION (HCC): ICD-10-CM

## 2018-08-08 DIAGNOSIS — G45.9 TRANSIENT CEREBRAL ISCHEMIA, UNSPECIFIED TYPE: ICD-10-CM

## 2018-08-08 DIAGNOSIS — Z13.6 SCREENING FOR ISCHEMIC HEART DISEASE: ICD-10-CM

## 2018-08-08 DIAGNOSIS — F43.21 GRIEF REACTION: ICD-10-CM

## 2018-08-08 DIAGNOSIS — N39.46 MIXED STRESS AND URGE URINARY INCONTINENCE: ICD-10-CM

## 2018-08-08 PROCEDURE — 99214 OFFICE O/P EST MOD 30 MIN: CPT | Performed by: FAMILY MEDICINE

## 2018-08-08 RX ORDER — CITALOPRAM 20 MG/1
20 TABLET ORAL DAILY
Qty: 90 TABLET | Refills: 1 | Status: SHIPPED | OUTPATIENT
Start: 2018-08-08 | End: 2019-01-28 | Stop reason: SDUPTHER

## 2018-08-08 RX ORDER — CLOPIDOGREL BISULFATE 75 MG/1
TABLET ORAL
Qty: 30 TABLET | Refills: 1 | Status: SHIPPED | OUTPATIENT
Start: 2018-08-08 | End: 2019-01-28 | Stop reason: SDUPTHER

## 2018-08-08 RX ORDER — IRBESARTAN 150 MG/1
150 TABLET ORAL DAILY
Qty: 90 TABLET | Refills: 1 | Status: SHIPPED | OUTPATIENT
Start: 2018-08-08 | End: 2019-01-28

## 2018-08-08 RX ORDER — OXYBUTYNIN CHLORIDE 10 MG/1
10 TABLET, EXTENDED RELEASE ORAL DAILY
Qty: 90 TABLET | Refills: 1 | Status: SHIPPED | OUTPATIENT
Start: 2018-08-08 | End: 2019-01-28

## 2018-08-08 NOTE — PROGRESS NOTES
"Chief Complaint   Patient presents with   • Hypertension   • Hypothyroidism   • Depression       Subjective     Sandy Varela presents to the office today to refill her medications. No medication side effects are reported. Her blood pressure is elevated.  We will change from lisinopril to irbesartan with short-term follow-up. She continues to take clopidogrel to prevent further TIAs.  Her current regimen is taking 1 tablet every third day.  Daily dosing causes increased bruising.  Depression is stable.     I have reviewed and updated her medications, medical history and problem list during today's office visit.     Patient Care Team:  Anoop Appiah MD as PCP - General (Family Medicine)  Judah Carolina II, MD as Consulting Physician (Neurology)  Sandy Espinoza MD as Consulting Physician (Endocrinology)  Yang Pritchett MD as Surgeon (General Surgery)  Misha Riley MD as Consulting Physician (Hematology)  Adrianna Prasad Jr., MD as Consulting Physician (Vascular Surgery)    Social History   Substance Use Topics   • Smoking status: Former Smoker   • Smokeless tobacco: Never Used   • Alcohol use Yes       Review of Systems   Constitutional: Negative for fatigue.   Cardiovascular: Negative for chest pain.       Objective     /71   Pulse 54   Temp 97.8 °F (36.6 °C)   Resp 16   Ht 157.5 cm (62.01\")   Wt 50.8 kg (112 lb)   SpO2 97%   BMI 20.48 kg/m²     Body mass index is 20.48 kg/m².    Physical Exam   Constitutional: She is oriented to person, place, and time. She appears well-developed. No distress.   Eyes: Conjunctivae and lids are normal.   Neck: Carotid bruit is not present.   Cardiovascular: Normal rate, regular rhythm and normal heart sounds.    Pulmonary/Chest: Effort normal and breath sounds normal.   Neurological: She is alert and oriented to person, place, and time.   Skin: Skin is warm and dry.   Psychiatric: She has a normal mood and affect. Her behavior is normal.   Vitals " reviewed.      Data Reviewed:             Assessment/Plan     Problem List Items Addressed This Visit     Grief reaction    Relevant Medications    citalopram (CeleXA) 20 MG tablet    Essential hypertension - Primary    Relevant Medications    irbesartan (AVAPRO) 150 MG tablet    Other Relevant Orders    Comprehensive metabolic panel    CBC and Differential    TSH    Recurrent major depressive disorder, in full remission (CMS/HCC)    Relevant Medications    citalopram (CeleXA) 20 MG tablet    TIA (transient ischemic attack)    Relevant Medications    clopidogrel (PLAVIX) 75 MG tablet    Mixed stress and urge urinary incontinence    Relevant Medications    oxybutynin XL (DITROPAN-XL) 10 MG 24 hr tablet    Hearing decreased, bilateral    Relevant Orders    Ambulatory Referral to ENT (Otolaryngology)      Other Visit Diagnoses     Screening for ischemic heart disease        Relevant Orders    Lipid Panel With LDL/HDL Ratio          Orders Placed This Encounter   Procedures   • Comprehensive metabolic panel     Standing Status:   Future     Standing Expiration Date:   5/5/2019   • Lipid Panel With LDL/HDL Ratio     Standing Status:   Future     Standing Expiration Date:   5/5/2019   • TSH     Standing Status:   Future     Standing Expiration Date:   5/5/2019   • Ambulatory Referral to ENT (Otolaryngology)     Referral Priority:   Routine     Referral Type:   Consultation     Referral Reason:   Specialty Services Required     Referred to Provider:   Adam James MD     Requested Specialty:   Otolaryngology     Number of Visits Requested:   1   • CBC and Differential     Standing Status:   Future     Standing Expiration Date:   5/5/2019     Order Specific Question:   Manual Differential     Answer:   No         Current Outpatient Prescriptions:   •  acetaminophen (TYLENOL) 325 MG tablet, Take 650 mg by mouth Every 6 (Six) Hours As Needed for Mild Pain (1-3)., Disp: , Rfl:   •  Calcium Carb-Cholecalciferol (CALCIUM +  D3 PO), Take  by mouth., Disp: , Rfl:   •  citalopram (CeleXA) 20 MG tablet, Take 1 tablet by mouth Daily., Disp: 90 tablet, Rfl: 1  •  clopidogrel (PLAVIX) 75 MG tablet, Take 1 po every 3rd day, Disp: 30 tablet, Rfl: 1  •  levothyroxine sodium (TIROSINT) 100 MCG capsule, Take 100 mcg by mouth Daily., Disp: , Rfl:   •  Multiple Vitamin (MULTIVITAMIN) tablet, Take 1 tablet by mouth daily., Disp: , Rfl:   •  docusate sodium 100 MG capsule, Take 100 mg by mouth., Disp: , Rfl:   •  ferrous gluconate (FERGON) 324 MG tablet, Take 1 tablet by mouth Daily With Breakfast., Disp: 90 tablet, Rfl: 1  •  irbesartan (AVAPRO) 150 MG tablet, Take 1 tablet by mouth Daily for 180 days., Disp: 90 tablet, Rfl: 1  •  oxybutynin XL (DITROPAN-XL) 10 MG 24 hr tablet, Take 1 tablet by mouth Daily for 180 days., Disp: 90 tablet, Rfl: 1    Return in about 6 weeks (around 9/19/2018) for Recheck.

## 2018-08-10 RX ORDER — LISINOPRIL 10 MG/1
TABLET ORAL
Qty: 90 TABLET | Refills: 1 | OUTPATIENT
Start: 2018-08-10

## 2018-09-13 ENCOUNTER — OFFICE VISIT (OUTPATIENT)
Dept: FAMILY MEDICINE CLINIC | Facility: CLINIC | Age: 78
End: 2018-09-13

## 2018-09-13 VITALS
DIASTOLIC BLOOD PRESSURE: 66 MMHG | HEART RATE: 50 BPM | TEMPERATURE: 97.4 F | WEIGHT: 110 LBS | HEIGHT: 62 IN | RESPIRATION RATE: 16 BRPM | BODY MASS INDEX: 20.24 KG/M2 | SYSTOLIC BLOOD PRESSURE: 138 MMHG

## 2018-09-13 DIAGNOSIS — I10 ESSENTIAL HYPERTENSION: Primary | ICD-10-CM

## 2018-09-13 PROCEDURE — 99212 OFFICE O/P EST SF 10 MIN: CPT | Performed by: FAMILY MEDICINE

## 2018-09-13 NOTE — PROGRESS NOTES
"Chief Complaint   Patient presents with   • Hypertension       Subjective   She returns today to recheck arterial blood pressure.  Her blood pressure is well controlled on current therapy.  No medication side effects are reported.  Overall, she feels better.  I have reviewed and updated her medications, medical history and problem list during today's office visit.     Patient Care Team:  Anoop Appiah MD as PCP - General (Family Medicine)  Judah Carolina II, MD as Consulting Physician (Neurology)  Sandy Espinoza MD as Consulting Physician (Endocrinology)  Yang Pritchett MD as Surgeon (General Surgery)  Misha Riley MD as Consulting Physician (Hematology)  Adrianna Prasad Jr., MD as Consulting Physician (Vascular Surgery)    Social History   Substance Use Topics   • Smoking status: Former Smoker   • Smokeless tobacco: Never Used   • Alcohol use Yes       Review of Systems   Constitutional: Negative for fatigue.   Cardiovascular: Negative for chest pain.       Objective     /66 (BP Location: Right arm, Patient Position: Sitting, Cuff Size: Adult)   Pulse 50   Temp 97.4 °F (36.3 °C) (Oral)   Resp 16   Ht 157.5 cm (62.01\")   Wt 49.9 kg (110 lb)   BMI 20.11 kg/m²     Body mass index is 20.11 kg/m².    Physical Exam   Constitutional: She is oriented to person, place, and time. She appears well-developed. No distress.   Eyes: Conjunctivae and lids are normal.   Neck: Carotid bruit is not present.   Cardiovascular: Normal rate, regular rhythm and normal heart sounds.    Pulmonary/Chest: Effort normal and breath sounds normal.   Neurological: She is alert and oriented to person, place, and time.   Skin: Skin is warm and dry.   Psychiatric: She has a normal mood and affect. Her behavior is normal.   Vitals reviewed.       Data Reviewed:             Assessment/Plan     Problem List Items Addressed This Visit     Essential hypertension - Primary     Hypertension is improving with " treatment.  Continue current treatment regimen.  Blood pressure will be reassessed at the next regular appointment.               No orders of the defined types were placed in this encounter.        Current Outpatient Prescriptions:   •  acetaminophen (TYLENOL) 325 MG tablet, Take 650 mg by mouth Every 6 (Six) Hours As Needed for Mild Pain (1-3)., Disp: , Rfl:   •  Calcium Carb-Cholecalciferol (CALCIUM + D3 PO), Take  by mouth., Disp: , Rfl:   •  citalopram (CeleXA) 20 MG tablet, Take 1 tablet by mouth Daily., Disp: 90 tablet, Rfl: 1  •  clopidogrel (PLAVIX) 75 MG tablet, Take 1 po every 3rd day, Disp: 30 tablet, Rfl: 1  •  docusate sodium 100 MG capsule, Take 100 mg by mouth., Disp: , Rfl:   •  ferrous gluconate (FERGON) 324 MG tablet, Take 1 tablet by mouth Daily With Breakfast., Disp: 90 tablet, Rfl: 1  •  irbesartan (AVAPRO) 150 MG tablet, Take 1 tablet by mouth Daily for 180 days., Disp: 90 tablet, Rfl: 1  •  levothyroxine sodium (TIROSINT) 100 MCG capsule, Take 100 mcg by mouth Daily., Disp: , Rfl:   •  Multiple Vitamin (MULTIVITAMIN) tablet, Take 1 tablet by mouth daily., Disp: , Rfl:   •  oxybutynin XL (DITROPAN-XL) 10 MG 24 hr tablet, Take 1 tablet by mouth Daily for 180 days., Disp: 90 tablet, Rfl: 1    Return in about 5 months (around 2/1/2019) for Medicare Wellness and regular visit, 30 minutes.

## 2018-09-13 NOTE — PATIENT INSTRUCTIONS
Check on insurance coverage and cost for Shingrix (newest shingles vaccine) and get the immunization at your local pharmacy. It is more effective than the old Zostavax vaccine and is recommended even if you have had the Zostavax vaccine in the past. For more information, please look at the website below:    https://www.cdc.gov/vaccines/vpd/shingles/public/shingrix/index.html    Annual flu shot has been recommended to patient. Optimal timing of this vaccination is in mid October of each year.

## 2018-11-08 ENCOUNTER — OFFICE VISIT (OUTPATIENT)
Dept: FAMILY MEDICINE CLINIC | Facility: CLINIC | Age: 78
End: 2018-11-08

## 2018-11-08 VITALS
TEMPERATURE: 97.9 F | HEIGHT: 62 IN | SYSTOLIC BLOOD PRESSURE: 150 MMHG | DIASTOLIC BLOOD PRESSURE: 72 MMHG | WEIGHT: 112 LBS | HEART RATE: 58 BPM | BODY MASS INDEX: 20.61 KG/M2 | OXYGEN SATURATION: 98 %

## 2018-11-08 DIAGNOSIS — K59.00 CONSTIPATION, UNSPECIFIED CONSTIPATION TYPE: Primary | ICD-10-CM

## 2018-11-08 DIAGNOSIS — R10.30 LOWER ABDOMINAL PAIN: ICD-10-CM

## 2018-11-08 LAB
BILIRUB BLD-MCNC: NEGATIVE MG/DL
CLARITY, POC: CLEAR
COLOR UR: YELLOW
GLUCOSE UR STRIP-MCNC: NEGATIVE MG/DL
KETONES UR QL: ABNORMAL
LEUKOCYTE EST, POC: NEGATIVE
NITRITE UR-MCNC: NEGATIVE MG/ML
PH UR: 6.5 [PH] (ref 5–8)
PROT UR STRIP-MCNC: NEGATIVE MG/DL
RBC # UR STRIP: NEGATIVE /UL
SP GR UR: 1.02 (ref 1–1.03)
UROBILINOGEN UR QL: NORMAL

## 2018-11-08 PROCEDURE — 99213 OFFICE O/P EST LOW 20 MIN: CPT | Performed by: NURSE PRACTITIONER

## 2018-11-08 PROCEDURE — 81003 URINALYSIS AUTO W/O SCOPE: CPT | Performed by: NURSE PRACTITIONER

## 2018-11-08 NOTE — PROGRESS NOTES
Subjective   Sandy Varela is a 78 y.o. female.     History of Present Illness   Sandy Varela 78 y.o. female who presents for evaluation of abdominal pain and bloating. Symptoms have been present for a few days .  The condition is aggravated by nothing identified . she is experiencing small ball size stools, trouble pushing out stools and bloating.  Alleviating factors are laxatives with some help, but still symptoms . Patient denies fever, chills, dyschezia, melena, bright red blood in stool, reflux, vomiting and dysphagia her past medical history is notable for constipation .  Patient denies recent travel.             The following portions of the patient's history were reviewed and updated as appropriate: allergies, current medications, past family history, past medical history, past social history, past surgical history and problem list.    Review of Systems   Constitutional: Negative for unexpected weight change.   Respiratory: Negative for shortness of breath.    Cardiovascular: Negative for chest pain and palpitations.   Gastrointestinal: Positive for abdominal distention, abdominal pain and constipation. Negative for anal bleeding, blood in stool, diarrhea, nausea, rectal pain and vomiting.   Genitourinary: Positive for frequency and pelvic pain. Negative for decreased urine volume, difficulty urinating, dyspareunia, dysuria, enuresis, flank pain, genital sores, hematuria, menstrual problem, urgency, vaginal bleeding, vaginal discharge and vaginal pain.   Psychiatric/Behavioral: Negative for behavioral problems.       Objective   Physical Exam   Constitutional: She is oriented to person, place, and time. She appears well-developed and well-nourished.   Pulmonary/Chest: Effort normal.   Abdominal: Normal appearance and bowel sounds are normal. There is generalized tenderness. There is no CVA tenderness.   Neurological: She is alert and oriented to person, place, and time.   Psychiatric: She has a normal mood  and affect. Judgment normal.   Nursing note and vitals reviewed.      Assessment/Plan   Sandy was seen today for abdominal pain, back pain and urinary frequency.    Diagnoses and all orders for this visit:    Constipation, unspecified constipation type    Lower abdominal pain  -     POC Urinalysis Dipstick, Automated      Patient will use glycerin suppository tonight. She was given samples of Linzess 71 mcg to start tomorrow morning.  She will f/u if symptoms worsen or do not improve.  She will call if she would like RX for Linzess sent to pharmacy.

## 2018-11-21 ENCOUNTER — TELEPHONE (OUTPATIENT)
Dept: FAMILY MEDICINE CLINIC | Facility: CLINIC | Age: 78
End: 2018-11-21

## 2018-11-21 DIAGNOSIS — N39.46 MIXED STRESS AND URGE URINARY INCONTINENCE: Primary | ICD-10-CM

## 2018-11-21 DIAGNOSIS — N95.9 MENOPAUSAL AND PERIMENOPAUSAL DISORDER: ICD-10-CM

## 2018-12-20 RX ORDER — CLOPIDOGREL BISULFATE 75 MG/1
TABLET ORAL
Qty: 30 TABLET | Refills: 1 | OUTPATIENT
Start: 2018-12-20

## 2018-12-20 RX ORDER — OXYBUTYNIN CHLORIDE 10 MG/1
TABLET, EXTENDED RELEASE ORAL
Qty: 90 TABLET | Refills: 1 | OUTPATIENT
Start: 2018-12-20

## 2018-12-20 RX ORDER — IRBESARTAN 150 MG/1
TABLET ORAL
Qty: 90 TABLET | Refills: 1 | OUTPATIENT
Start: 2018-12-20

## 2018-12-20 RX ORDER — CITALOPRAM 20 MG/1
TABLET ORAL
Qty: 90 TABLET | Refills: 1 | OUTPATIENT
Start: 2018-12-20

## 2019-01-05 ENCOUNTER — RESULTS ENCOUNTER (OUTPATIENT)
Dept: FAMILY MEDICINE CLINIC | Facility: CLINIC | Age: 79
End: 2019-01-05

## 2019-01-05 DIAGNOSIS — Z13.6 SCREENING FOR ISCHEMIC HEART DISEASE: ICD-10-CM

## 2019-01-05 DIAGNOSIS — I10 ESSENTIAL HYPERTENSION: ICD-10-CM

## 2019-01-23 LAB
ALBUMIN SERPL-MCNC: 4.6 G/DL (ref 3.5–5.2)
ALBUMIN/GLOB SERPL: 1.8 G/DL
ALP SERPL-CCNC: 64 U/L (ref 39–117)
ALT SERPL-CCNC: 23 U/L (ref 1–33)
AST SERPL-CCNC: 29 U/L (ref 1–32)
BASOPHILS # BLD AUTO: 0.02 10*3/MM3 (ref 0–0.2)
BASOPHILS NFR BLD AUTO: 0.5 % (ref 0–1.5)
BILIRUB SERPL-MCNC: 0.5 MG/DL (ref 0.1–1.2)
BUN SERPL-MCNC: 12 MG/DL (ref 8–23)
BUN/CREAT SERPL: 12.1 (ref 7–25)
CALCIUM SERPL-MCNC: 9.7 MG/DL (ref 8.6–10.5)
CHLORIDE SERPL-SCNC: 97 MMOL/L (ref 98–107)
CHOLEST SERPL-MCNC: 231 MG/DL (ref 0–200)
CO2 SERPL-SCNC: 33.6 MMOL/L (ref 22–29)
CREAT SERPL-MCNC: 0.99 MG/DL (ref 0.57–1)
EOSINOPHIL # BLD AUTO: 0.11 10*3/MM3 (ref 0–0.7)
EOSINOPHIL NFR BLD AUTO: 2.8 % (ref 0.3–6.2)
ERYTHROCYTE [DISTWIDTH] IN BLOOD BY AUTOMATED COUNT: 14.2 % (ref 11.7–13)
GLOBULIN SER CALC-MCNC: 2.6 GM/DL
GLUCOSE SERPL-MCNC: 94 MG/DL (ref 65–99)
HCT VFR BLD AUTO: 39.3 % (ref 35.6–45.5)
HDLC SERPL-MCNC: 78 MG/DL (ref 40–60)
HGB BLD-MCNC: 12.4 G/DL (ref 11.9–15.5)
IMM GRANULOCYTES # BLD AUTO: 0.06 10*3/MM3 (ref 0–0.03)
IMM GRANULOCYTES NFR BLD AUTO: 1.5 % (ref 0–0.5)
LDLC SERPL CALC-MCNC: 131 MG/DL (ref 0–100)
LDLC/HDLC SERPL: 1.68 {RATIO}
LYMPHOCYTES # BLD AUTO: 1.46 10*3/MM3 (ref 0.9–4.8)
LYMPHOCYTES NFR BLD AUTO: 36.7 % (ref 19.6–45.3)
MCH RBC QN AUTO: 29.5 PG (ref 26.9–32)
MCHC RBC AUTO-ENTMCNC: 31.6 G/DL (ref 32.4–36.3)
MCV RBC AUTO: 93.3 FL (ref 80.5–98.2)
MONOCYTES # BLD AUTO: 0.71 10*3/MM3 (ref 0.2–1.2)
MONOCYTES NFR BLD AUTO: 17.8 % (ref 5–12)
NEUTROPHILS # BLD AUTO: 1.68 10*3/MM3 (ref 1.9–8.1)
NEUTROPHILS NFR BLD AUTO: 42.2 % (ref 42.7–76)
PLATELET # BLD AUTO: 207 10*3/MM3 (ref 140–500)
POTASSIUM SERPL-SCNC: 4.1 MMOL/L (ref 3.5–5.2)
PROT SERPL-MCNC: 7.2 G/DL (ref 6–8.5)
RBC # BLD AUTO: 4.21 10*6/MM3 (ref 3.9–5.2)
SODIUM SERPL-SCNC: 141 MMOL/L (ref 136–145)
TRIGL SERPL-MCNC: 109 MG/DL (ref 0–150)
TSH SERPL DL<=0.005 MIU/L-ACNC: 10.21 MIU/ML (ref 0.27–4.2)
VLDLC SERPL CALC-MCNC: 21.8 MG/DL (ref 5–40)
WBC # BLD AUTO: 3.98 10*3/MM3 (ref 4.5–10.7)

## 2019-01-28 ENCOUNTER — OFFICE VISIT (OUTPATIENT)
Dept: FAMILY MEDICINE CLINIC | Facility: CLINIC | Age: 79
End: 2019-01-28

## 2019-01-28 VITALS
WEIGHT: 118 LBS | SYSTOLIC BLOOD PRESSURE: 152 MMHG | RESPIRATION RATE: 16 BRPM | HEART RATE: 62 BPM | TEMPERATURE: 98.2 F | BODY MASS INDEX: 21.71 KG/M2 | HEIGHT: 62 IN | DIASTOLIC BLOOD PRESSURE: 85 MMHG

## 2019-01-28 DIAGNOSIS — K59.04 CHRONIC IDIOPATHIC CONSTIPATION: ICD-10-CM

## 2019-01-28 DIAGNOSIS — F43.21 GRIEF REACTION: ICD-10-CM

## 2019-01-28 DIAGNOSIS — N39.46 MIXED STRESS AND URGE URINARY INCONTINENCE: ICD-10-CM

## 2019-01-28 DIAGNOSIS — I10 ESSENTIAL HYPERTENSION: Primary | ICD-10-CM

## 2019-01-28 DIAGNOSIS — F33.42 RECURRENT MAJOR DEPRESSIVE DISORDER, IN FULL REMISSION (HCC): ICD-10-CM

## 2019-01-28 DIAGNOSIS — D50.8 OTHER IRON DEFICIENCY ANEMIA: ICD-10-CM

## 2019-01-28 DIAGNOSIS — G45.9 TIA (TRANSIENT ISCHEMIC ATTACK): ICD-10-CM

## 2019-01-28 PROBLEM — D50.9 IRON DEFICIENCY ANEMIA: Status: RESOLVED | Noted: 2017-03-30 | Resolved: 2019-01-28

## 2019-01-28 PROCEDURE — 99214 OFFICE O/P EST MOD 30 MIN: CPT | Performed by: FAMILY MEDICINE

## 2019-01-28 RX ORDER — CITALOPRAM 20 MG/1
20 TABLET ORAL DAILY
Qty: 90 TABLET | Refills: 1 | Status: SHIPPED | OUTPATIENT
Start: 2019-01-28 | End: 2019-01-30 | Stop reason: SDUPTHER

## 2019-01-28 RX ORDER — CLOPIDOGREL BISULFATE 75 MG/1
75 TABLET ORAL DAILY
Qty: 90 TABLET | Refills: 1 | Status: SHIPPED | OUTPATIENT
Start: 2019-01-28 | End: 2019-01-28

## 2019-01-28 RX ORDER — OLMESARTAN MEDOXOMIL 40 MG/1
40 TABLET ORAL DAILY
Qty: 90 TABLET | Refills: 1 | Status: SHIPPED | OUTPATIENT
Start: 2019-01-28 | End: 2019-01-30 | Stop reason: SDUPTHER

## 2019-01-28 RX ORDER — CLOPIDOGREL BISULFATE 75 MG/1
75 TABLET ORAL DAILY
Qty: 90 TABLET | Refills: 1 | Status: SHIPPED | OUTPATIENT
Start: 2019-01-28 | End: 2019-01-30 | Stop reason: SDUPTHER

## 2019-01-28 NOTE — ASSESSMENT & PLAN NOTE
Hypertension is worsening.  Medication changes per orders. change to olmesartan  Blood pressure will be reassessed at the next regular appointment.

## 2019-01-28 NOTE — PROGRESS NOTES
"Chief Complaint   Patient presents with   • Hypertension   • Incontinence       Subjective     Sandy Varela presents to the office today to refill her medications and review recent labs. No medication side effects are reported. Today her blood pressure is above goal.  We will need to change medications due torecent recall concerns of irbesartan.  She still continues to have problems with chronic idiopathic constipation.  Recent labs at been reviewed and show worsening of thyroid.  She will contact her endocrinologist.  She has also had some unexplained 56 pound weight gain since Christmas.  She continues to take clopidogrel for history of TIA.  That condition is stable.  Depression is stable.  Previous iron deficiency anemia is resolved.  She continues to have problems with urinary incontinence and has received papers from uro-gynecologist and we'll see that provider in the near future.     I have reviewed and updated her medications, medical history and problem list during today's office visit.      Patient Care Team:  Aonop Appiah MD as PCP - General (Family Medicine)  Judah Carolina II, MD as Consulting Physician (Neurology)  Sandy Esipnoza MD as Consulting Physician (Endocrinology)  Yang Pritchett MD as Surgeon (General Surgery)  Misha Riley MD as Consulting Physician (Hematology)  Adrianna Prasad Jr., MD as Consulting Physician (Vascular Surgery)  Reena Dodson MD as Consulting Physician (Obstetrics and Gynecology)    Social History     Tobacco Use   • Smoking status: Former Smoker   • Smokeless tobacco: Never Used   Substance Use Topics   • Alcohol use: Yes       Review of Systems   Constitutional: Positive for fatigue and unexpected weight change.   Gastrointestinal: Positive for constipation.   Genitourinary:        See HPI       Objective     /85   Pulse 62   Temp 98.2 °F (36.8 °C) (Oral)   Resp 16   Ht 157.5 cm (62.01\")   Wt 53.5 kg (118 lb)   BMI 21.58 kg/m² "     Body mass index is 21.58 kg/m².    Physical Exam   Constitutional: She is oriented to person, place, and time. She appears well-developed. No distress.   Eyes: Conjunctivae and lids are normal.   Neck: Carotid bruit is not present.   Cardiovascular: Normal rate, regular rhythm and normal heart sounds.   Pulmonary/Chest: Effort normal and breath sounds normal.   Neurological: She is alert and oriented to person, place, and time.   Skin: Skin is warm and dry.   Psychiatric: She has a normal mood and affect. Her behavior is normal.   Vitals reviewed.      Data Reviewed:             Lab Results   Component Value Date    GLU 94 01/22/2019    BUN 12 01/22/2019    CREATININE 0.99 01/22/2019    EGFRIFNONA 54 (L) 01/22/2019    EGFRIFAFRI 66 01/22/2019     01/22/2019    K 4.1 01/22/2019    CL 97 (L) 01/22/2019    CO2 33.6 (H) 01/22/2019    CALCIUM 9.7 01/22/2019    ALBUMIN 4.60 01/22/2019    LABGLOBREF 2.6 01/22/2019    BILITOT 0.5 01/22/2019    ALKPHOS 64 01/22/2019    AST 29 01/22/2019    ALT 23 01/22/2019    CHLPL 231 (H) 01/22/2019    TRIG 109 01/22/2019    HDL 78 (H) 01/22/2019    VLDL 21.8 01/22/2019     (H) 01/22/2019    LDLHDL 1.68 01/22/2019    WBC 3.98 (L) 01/22/2019    RBC 4.21 01/22/2019    HCT 39.3 01/22/2019    MCV 93.3 01/22/2019    MCH 29.5 01/22/2019    MCHC 31.6 (L) 01/22/2019    RDW 14.2 (H) 01/22/2019    TSH 10.210 (H) 01/22/2019          Assessment/Plan     Problem List Items Addressed This Visit     Grief reaction    Relevant Medications    citalopram (CeleXA) 20 MG tablet    Essential hypertension - Primary     Hypertension is worsening.  Medication changes per orders. change to olmesartan  Blood pressure will be reassessed at the next regular appointment.         Relevant Medications    olmesartan (BENICAR) 40 MG tablet    Recurrent major depressive disorder, in full remission (CMS/HCC)     Psychological condition is unchanged.  Continue current treatment regimen.  Psychological  condition  will be reassessed at the next regular appointment.         Relevant Medications    citalopram (CeleXA) 20 MG tablet    Chronic idiopathic constipation     Pt will contact Dr. Espinoza to adjust thyroid condition.         TIA (transient ischemic attack)     The current medical regimen is effective;  continue present plan and medications.           Relevant Medications    clopidogrel (PLAVIX) 75 MG tablet    RESOLVED: Iron deficiency anemia     Much improved         Mixed stress and urge urinary incontinence     See specialist               No orders of the defined types were placed in this encounter.        Current Outpatient Medications:   •  acetaminophen (TYLENOL) 325 MG tablet, Take 650 mg by mouth Every 6 (Six) Hours As Needed for Mild Pain (1-3)., Disp: , Rfl:   •  Calcium Carb-Cholecalciferol (CALCIUM + D3 PO), Take  by mouth., Disp: , Rfl:   •  citalopram (CeleXA) 20 MG tablet, Take 1 tablet by mouth Daily., Disp: 90 tablet, Rfl: 1  •  clopidogrel (PLAVIX) 75 MG tablet, Take 1 tablet by mouth Daily for 180 days. Take 1 po every 3rd day, Disp: 90 tablet, Rfl: 1  •  docusate sodium 100 MG capsule, Take 100 mg by mouth., Disp: , Rfl:   •  levothyroxine sodium (TIROSINT) 100 MCG capsule, Take 100 mcg by mouth Daily., Disp: , Rfl:   •  Multiple Vitamin (MULTIVITAMIN) tablet, Take 1 tablet by mouth daily., Disp: , Rfl:   •  olmesartan (BENICAR) 40 MG tablet, Take 1 tablet by mouth Daily for 180 days., Disp: 90 tablet, Rfl: 1    Return in about 6 months (around 7/28/2019) for Recheck.

## 2019-01-29 ENCOUNTER — TELEPHONE (OUTPATIENT)
Dept: FAMILY MEDICINE CLINIC | Facility: CLINIC | Age: 79
End: 2019-01-29

## 2019-01-30 ENCOUNTER — TELEPHONE (OUTPATIENT)
Dept: FAMILY MEDICINE CLINIC | Facility: CLINIC | Age: 79
End: 2019-01-30

## 2019-01-30 DIAGNOSIS — F33.42 RECURRENT MAJOR DEPRESSIVE DISORDER, IN FULL REMISSION (HCC): ICD-10-CM

## 2019-01-30 DIAGNOSIS — F43.21 GRIEF REACTION: ICD-10-CM

## 2019-01-30 DIAGNOSIS — G45.9 TIA (TRANSIENT ISCHEMIC ATTACK): ICD-10-CM

## 2019-01-30 DIAGNOSIS — I10 ESSENTIAL HYPERTENSION: ICD-10-CM

## 2019-01-30 RX ORDER — CLOPIDOGREL BISULFATE 75 MG/1
75 TABLET ORAL DAILY
Qty: 90 TABLET | Refills: 1 | Status: SHIPPED | OUTPATIENT
Start: 2019-01-30 | End: 2019-01-30 | Stop reason: SDUPTHER

## 2019-01-30 RX ORDER — CLOPIDOGREL BISULFATE 75 MG/1
TABLET ORAL
Qty: 30 TABLET | Refills: 1 | Status: SHIPPED | OUTPATIENT
Start: 2019-01-30 | End: 2019-07-01 | Stop reason: SDUPTHER

## 2019-01-30 RX ORDER — CITALOPRAM 20 MG/1
20 TABLET ORAL DAILY
Qty: 90 TABLET | Refills: 1 | Status: SHIPPED | OUTPATIENT
Start: 2019-01-30 | End: 2019-02-04 | Stop reason: SDUPTHER

## 2019-01-30 RX ORDER — OLMESARTAN MEDOXOMIL 40 MG/1
40 TABLET ORAL DAILY
Qty: 90 TABLET | Refills: 1 | Status: SHIPPED | OUTPATIENT
Start: 2019-01-30 | End: 2019-02-04 | Stop reason: SDUPTHER

## 2019-02-04 ENCOUNTER — TELEPHONE (OUTPATIENT)
Dept: FAMILY MEDICINE CLINIC | Facility: CLINIC | Age: 79
End: 2019-02-04

## 2019-02-04 DIAGNOSIS — I10 ESSENTIAL HYPERTENSION: ICD-10-CM

## 2019-02-04 DIAGNOSIS — F33.42 RECURRENT MAJOR DEPRESSIVE DISORDER, IN FULL REMISSION (HCC): ICD-10-CM

## 2019-02-04 DIAGNOSIS — F43.21 GRIEF REACTION: ICD-10-CM

## 2019-02-04 RX ORDER — CITALOPRAM 20 MG/1
20 TABLET ORAL DAILY
Qty: 90 TABLET | Refills: 1 | Status: SHIPPED | OUTPATIENT
Start: 2019-02-04 | End: 2019-07-01 | Stop reason: SDUPTHER

## 2019-02-04 RX ORDER — OLMESARTAN MEDOXOMIL 40 MG/1
40 TABLET ORAL DAILY
Qty: 90 TABLET | Refills: 1 | Status: SHIPPED | OUTPATIENT
Start: 2019-02-04 | End: 2019-05-16 | Stop reason: SDUPTHER

## 2019-05-16 ENCOUNTER — TELEPHONE (OUTPATIENT)
Dept: FAMILY MEDICINE CLINIC | Facility: CLINIC | Age: 79
End: 2019-05-16

## 2019-05-16 DIAGNOSIS — I10 ESSENTIAL HYPERTENSION: ICD-10-CM

## 2019-05-16 RX ORDER — OLMESARTAN MEDOXOMIL 40 MG/1
40 TABLET ORAL DAILY
Qty: 90 TABLET | Refills: 0 | Status: SHIPPED | OUTPATIENT
Start: 2019-05-16 | End: 2019-07-01 | Stop reason: SDUPTHER

## 2019-07-01 ENCOUNTER — OFFICE VISIT (OUTPATIENT)
Dept: FAMILY MEDICINE CLINIC | Facility: CLINIC | Age: 79
End: 2019-07-01

## 2019-07-01 VITALS
DIASTOLIC BLOOD PRESSURE: 73 MMHG | HEIGHT: 62 IN | HEART RATE: 65 BPM | RESPIRATION RATE: 16 BRPM | WEIGHT: 114 LBS | OXYGEN SATURATION: 100 % | BODY MASS INDEX: 20.98 KG/M2 | SYSTOLIC BLOOD PRESSURE: 135 MMHG

## 2019-07-01 DIAGNOSIS — E78.49 OTHER HYPERLIPIDEMIA: ICD-10-CM

## 2019-07-01 DIAGNOSIS — Z00.00 MEDICARE ANNUAL WELLNESS VISIT, INITIAL: Primary | ICD-10-CM

## 2019-07-01 DIAGNOSIS — Z12.31 ENCOUNTER FOR SCREENING MAMMOGRAM FOR BREAST CANCER: ICD-10-CM

## 2019-07-01 DIAGNOSIS — F33.42 RECURRENT MAJOR DEPRESSIVE DISORDER, IN FULL REMISSION (HCC): ICD-10-CM

## 2019-07-01 DIAGNOSIS — G45.9 TIA (TRANSIENT ISCHEMIC ATTACK): ICD-10-CM

## 2019-07-01 DIAGNOSIS — Z78.0 MENOPAUSE: ICD-10-CM

## 2019-07-01 DIAGNOSIS — I10 ESSENTIAL HYPERTENSION: ICD-10-CM

## 2019-07-01 DIAGNOSIS — F43.21 GRIEF REACTION: ICD-10-CM

## 2019-07-01 DIAGNOSIS — N39.46 MIXED STRESS AND URGE URINARY INCONTINENCE: ICD-10-CM

## 2019-07-01 DIAGNOSIS — Z13.6 SCREENING FOR ISCHEMIC HEART DISEASE: ICD-10-CM

## 2019-07-01 PROCEDURE — 99214 OFFICE O/P EST MOD 30 MIN: CPT | Performed by: FAMILY MEDICINE

## 2019-07-01 PROCEDURE — G0438 PPPS, INITIAL VISIT: HCPCS | Performed by: FAMILY MEDICINE

## 2019-07-01 RX ORDER — OXYBUTYNIN CHLORIDE 5 MG/1
10 TABLET, EXTENDED RELEASE ORAL DAILY
Qty: 180 TABLET | Refills: 1 | Status: SHIPPED | OUTPATIENT
Start: 2019-07-01 | End: 2019-12-30 | Stop reason: SDUPTHER

## 2019-07-01 RX ORDER — ROSUVASTATIN CALCIUM 5 MG/1
5 TABLET, COATED ORAL NIGHTLY
Qty: 90 TABLET | Refills: 1 | Status: SHIPPED | OUTPATIENT
Start: 2019-07-01 | End: 2020-01-03

## 2019-07-01 RX ORDER — CLOPIDOGREL BISULFATE 75 MG/1
TABLET ORAL
Qty: 60 TABLET | Refills: 0 | Status: SHIPPED | OUTPATIENT
Start: 2019-07-01 | End: 2019-07-24 | Stop reason: SDUPTHER

## 2019-07-01 RX ORDER — LEVOTHYROXINE SODIUM 0.1 MG/1
100 TABLET ORAL EVERY MORNING
Refills: 1 | COMMUNITY
Start: 2019-05-05 | End: 2020-07-22 | Stop reason: ALTCHOICE

## 2019-07-01 RX ORDER — CITALOPRAM 20 MG/1
20 TABLET ORAL DAILY
Qty: 90 TABLET | Refills: 1 | Status: SHIPPED | OUTPATIENT
Start: 2019-07-01 | End: 2019-07-24 | Stop reason: SDUPTHER

## 2019-07-01 RX ORDER — OLMESARTAN MEDOXOMIL 40 MG/1
40 TABLET ORAL DAILY
Qty: 90 TABLET | Refills: 1 | Status: SHIPPED | OUTPATIENT
Start: 2019-07-01 | End: 2019-10-28 | Stop reason: SDUPTHER

## 2019-07-01 RX ORDER — IRBESARTAN 150 MG/1
TABLET ORAL
COMMUNITY
Start: 2018-08-08 | End: 2019-07-01

## 2019-07-01 NOTE — ASSESSMENT & PLAN NOTE
Continue current therapy.  Pros and cons of medication discussed.  We will decrease dose to 5 mg daily.

## 2019-07-01 NOTE — PROGRESS NOTES
Initial Medicare Wellness Visit   The ABC's of the Annual Wellness Visit    Chief Complaint   Patient presents with   • Medicare Wellness-Initial Visit   • Hypertension       HPI:  Sandy Varela, -1940, is a 79 y.o. female who presents for an Initial Medicare Wellness Visit.  She is also here to follow-up on medications and labs.  Blood pressure is controlled.  Depression is stable.  We talked in brief about her urinary incontinence issue.  She seems to do much better with current medication.  Previously she tried Myrbetriq and it was not effective and also expensive.  She remains under the care of especially for thyroid control.  Labs have been reviewed and are satisfactory to improved.  TSH is still not controlled and she will follow-up with endocrine on this condition.  The 10-year ASCVD risk score (Michaela ALANIS Jr., et al., 2013) is: 32.1%    Values used to calculate the score:      Age: 79 years      Sex: Female      Is Non- : No      Diabetic: No      Tobacco smoker: No      Systolic Blood Pressure: 135 mmHg      Is BP treated: Yes      HDL Cholesterol: 78 mg/dL      Total Cholesterol: 231 mg/dL    Recent Hospitalizations:  No hospitalization(s) within the last year..    Current Medical Providers:  Patient Care Team:  Anoop Appiah MD as PCP - General (Family Medicine)  Anoop Appiah MD as PCP - Claims Attributed  CaityJudah mcgee II, MD as Consulting Physician (Neurology)  Sandy Espinoza MD as Consulting Physician (Endocrinology)  Yang Pritchett MD as Surgeon (General Surgery)  Misha Riley MD as Consulting Physician (Hematology)  Adrianna Prasad Jr., MD as Consulting Physician (Vascular Surgery)  Reena Dodson MD as Consulting Physician (Obstetrics and Gynecology)    Health Habits and Functional and Cognitive Screening and Depression Screening:  Functional & Cognitive Status 2019   Do you have difficulty preparing food and eating? No   Do you have  difficulty bathing yourself, getting dressed or grooming yourself? No   Do you have difficulty using the toilet? No   Do you have difficulty moving around from place to place? No   Do you have trouble with steps or getting out of a bed or a chair? No   In the past year have you fallen or experienced a near fall? No   Current Diet Well Balanced Diet   Dental Exam Not up to date   Eye Exam Up to date   Exercise (times per week) 2 times per week   Current Exercise Activities Include Walking   Do you need help using the phone?  No   Are you deaf or do you have serious difficulty hearing?  Yes   Do you need help with transportation? No   Do you need help shopping? No   Do you need help preparing meals?  No   Do you need help with housework?  No   Do you need help with laundry? No   Do you need help taking your medications? No   Do you need help managing money? No   Do you ever drive or ride in a car without wearing a seat belt? No       Compared to one year ago, the patient feels her physical health is the same and her mental health is the same.    Depression Screen:  PHQ-2/PHQ-9 Depression Screening 7/1/2019   Little interest or pleasure in doing things 1   Feeling down, depressed, or hopeless 0   Total Score 1         Past Medical/Family/Social History:  The following portions of the patient's history were reviewed and updated as appropriate: allergies, current medications, past family history, past medical history, past social history, past surgical history and problem list.    Aspirin use counseling: On clopidrogel as an alternative (due to ASA contraindication)    Current medication list contains no high risk medications.  No harmful drug interactions have been identified.     Review of Systems   Constitutional: Negative for fatigue.   Cardiovascular: Negative for chest pain.       Objective     Vitals:    07/01/19 0906   BP: 135/73   Pulse: 65   Resp: 16   SpO2: 100%   Weight: 51.7 kg (114 lb)   Height: 157.5 cm  "(62.01\")       Patient's Body mass index is 20.85 kg/m². BMI is within normal parameters. No follow-up required..      The patient has no evidence of cognitve impairment.     Physical Exam   Constitutional: She is oriented to person, place, and time. She appears well-developed. No distress.   Eyes: Conjunctivae and lids are normal.   Neck: Carotid bruit is not present.   Cardiovascular: Normal rate, regular rhythm and normal heart sounds.   Pulmonary/Chest: Effort normal and breath sounds normal.   Neurological: She is alert and oriented to person, place, and time.   Skin: Skin is warm and dry.   Psychiatric: She has a normal mood and affect. Her behavior is normal.   Vitals reviewed.      Recent Lab Results:  Lab Results   Component Value Date    GLU 94 01/22/2019     Lab Results   Component Value Date    TRIG 109 01/22/2019    HDL 78 (H) 01/22/2019    VLDL 21.8 01/22/2019    LDLHDL 1.68 01/22/2019         Assessment/Plan   Age-appropriate Screening Schedule:  Refer to the list below for future screening recommendations based on patient's age, sex and/or medical conditions.      Health Maintenance   Topic Date Due   • DXA SCAN  08/04/2018   • MAMMOGRAM  05/02/2019   • ZOSTER VACCINE (2 of 2) 02/27/2020 (Originally 6/26/2014)   • INFLUENZA VACCINE  08/01/2019   • LIPID PANEL  01/22/2020   • COLONOSCOPY  07/26/2022   • TDAP/TD VACCINES (4 - Td) 06/16/2026   • PNEUMOCOCCAL VACCINES (65+ LOW/MEDIUM RISK)  Completed       Medicare Risks and Personalized Health Plan:  cardiovascular risk, increased fall risk, depression and incontinence      CMS-Preventive Services Quick Reference  Medicare Preventive Services Addressed:  Bone densitometry screening, Fall Risk assessment done, Influenza vaccine, Screening mammography, referral placed, shingrix    Advance Care Planning:  Patient has an advance directive - a copy has not been provided. Have asked the patient to send this to us to add to record    Diagnoses and all orders " for this visit:    1. Medicare annual wellness visit, initial (Primary)  Comments:  Immunizations discussed.  Fall prevention information shared.  Patient to bring in copy of advanced directive for scanning.    2. Essential hypertension  Assessment & Plan:  Hypertension is unchanged.  Continue current treatment regimen.  Blood pressure will be reassessed at the next regular appointment.    Orders:  -     olmesartan (BENICAR) 40 MG tablet; Take 1 tablet by mouth Daily for 180 days.  Dispense: 90 tablet; Refill: 1  -     Comprehensive Metabolic Panel; Future  -     CBC & Differential; Future    3. TIA (transient ischemic attack)  Assessment & Plan:  Continue clopidogrel.    Orders:  -     clopidogrel (PLAVIX) 75 MG tablet; Take 1 po every 3rd day  Dispense: 60 tablet; Refill: 0    4. Grief reaction  Comments:  Steven()  2016  Orders:  -     citalopram (CeleXA) 20 MG tablet; Take 1 tablet by mouth Daily.  Dispense: 90 tablet; Refill: 1    5. Recurrent major depressive disorder, in full remission (CMS/HCC)  Assessment & Plan:  Psychological condition is unchanged.  Continue current treatment regimen.  Psychological condition  will be reassessed at the next regular appointment.    Orders:  -     citalopram (CeleXA) 20 MG tablet; Take 1 tablet by mouth Daily.  Dispense: 90 tablet; Refill: 1    6. Screening for ischemic heart disease  -     Lipid Panel With / Chol / HDL Ratio; Future    7. Menopause  -     DEXA Bone Density Axial; Future    8. Encounter for screening mammogram for breast cancer  -     Mammo Screening Bilateral With CAD; Future    9. Mixed stress and urge urinary incontinence  Assessment & Plan:  Continue current therapy.  Pros and cons of medication discussed.  We will decrease dose to 5 mg daily.    Orders:  -     oxybutynin XL (DITROPAN-XL) 5 MG 24 hr tablet; Take 2 tablets by mouth Daily for 180 days.  Dispense: 180 tablet; Refill: 1    10. Other hyperlipidemia  -     rosuvastatin (CRESTOR)  5 MG tablet; Take 1 tablet by mouth Every Night for 180 days.  Dispense: 90 tablet; Refill: 1      An After Visit Summary and PPPS with all of these plans were given to the patient.      Follow Up:  Return in about 6 months (around 1/1/2020) for Recheck.

## 2019-07-01 NOTE — PATIENT INSTRUCTIONS
Check on insurance coverage and cost for Shingrix (newest shingles vaccine) and get the immunization at your local pharmacy. It is more effective than the old Zostavax vaccine and is recommended even if you have had the Zostavax vaccine in the past. For more information, please look at the website below:    https://www.cdc.gov/vaccines/vpd/shingles/public/shingrix/index.html    Annual flu shot has been recommended to patient. Optimal timing of this vaccination is in mid October of each year.     Medicare Wellness  Personal Prevention Plan of Service     Date of Office Visit:  2019  Encounter Provider:  Anoop Appiah MD  Place of Service:  [facility]  Patient Name: Sandy Varela  :  1940    As part of the Medicare Wellness portion of your visit today, we are providing you with this personalized preventive plan of services (PPPS). This plan is based upon recommendations of the [place] Preventive Services Task Force (USPSTF) and the Advisory Committee on Immunization Practices (ACIP).    This lists the preventive care services that should be considered, and provides dates of when you are due. Items listed as completed are up-to-date and do not require any further intervention.    Health Maintenance   Topic Date Due   • MEDICARE ANNUAL WELLNESS  2016   • DXA SCAN  2018   • MAMMOGRAM  2019   • ZOSTER VACCINE (2 of 2) 2020 (Originally 2014)   • INFLUENZA VACCINE  2019   • LIPID PANEL  2020   • COLONOSCOPY  2022   • TDAP/TD VACCINES (4 - Td) 2026   • PNEUMOCOCCAL VACCINES (65+ LOW/MEDIUM RISK)  Completed       Orders Placed This Encounter   Procedures   • DEXA Bone Density Axial     Standing Status:   Future     Order Specific Question:   Reason for Exam:     Answer:   menopause   • Mammo Screening Bilateral With CAD     Standing Status:   Future     Standing Expiration Date:   2020     Order Specific Question:    Reason for Exam:     Answer:   screening for breast cancer   • Comprehensive Metabolic Panel     Standing Status:   Future     Standing Expiration Date:   3/27/2020   • Lipid Panel With / Chol / HDL Ratio     Standing Status:   Future     Standing Expiration Date:   3/27/2020   • CBC & Differential     Standing Status:   Future     Standing Expiration Date:   3/27/2020     Order Specific Question:   Manual Differential     Answer:   No       Return in about 6 months (around 1/1/2020) for Recheck.          Fall Prevention in the Home, Adult  Falls can cause injuries and can affect people from all age groups. There are many simple things that you can do to make your home safe and to help prevent falls. Ask for help when making these changes, if needed.  What actions can I take to prevent falls?  General instructions  · Use good lighting in all rooms. Replace any light bulbs that burn out.  · Turn on lights if it is dark. Use night-lights.  · Place frequently used items in easy-to-reach places. Lower the shelves around your home if necessary.  · Set up furniture so that there are clear paths around it. Avoid moving your furniture around.  · Remove throw rugs and other tripping hazards from the floor.  · Avoid walking on wet floors.  · Fix any uneven floor surfaces.  · Add color or contrast paint or tape to grab bars and handrails in your home. Place contrasting color strips on the first and last steps of stairways.  · When you use a stepladder, make sure that it is completely opened and that the sides are firmly locked. Have someone hold the ladder while you are using it. Do not climb a closed stepladder.  · Be aware of any and all pets.  What can I do in the bathroom?  · Keep the floor dry. Immediately clean up any water that spills onto the floor.  · Remove soap buildup in the tub or shower on a regular basis.  · Use non-skid mats or decals on the floor of the tub or shower.  · Attach bath mats securely with  double-sided, non-slip rug tape.  · If you need to sit down while you are in the shower, use a plastic, non-slip stool.  · Install grab bars by the toilet and in the tub and shower. Do not use towel bars as grab bars.  What can I do in the bedroom?  · Make sure that a bedside light is easy to reach.  · Do not use oversized bedding that drapes onto the floor.  · Have a firm chair that has side arms to use for getting dressed.  What can I do in the kitchen?  · Clean up any spills right away.  · If you need to reach for something above you, use a sturdy step stool that has a grab bar.  · Keep electrical cables out of the way.  · Do not use floor polish or wax that makes floors slippery. If you must use wax, make sure that it is non-skid floor wax.  What can I do in the stairways?  · Do not leave any items on the stairs.  · Make sure that you have a light switch at the top of the stairs and the bottom of the stairs. Have them installed if you do not have them.  · Make sure that there are handrails on both sides of the stairs. Fix handrails that are broken or loose. Make sure that handrails are as long as the stairways.  · Install non-slip stair treads on all stairs in your home.  · Avoid having throw rugs at the top or bottom of stairways, or secure the rugs with carpet tape to prevent them from moving.  · Choose a carpet design that does not hide the edge of steps on the stairway.  · Check any carpeting to make sure that it is firmly attached to the stairs. Fix any carpet that is loose or worn.  What can I do on the outside of my home?  · Use bright outdoor lighting.  · Regularly repair the edges of walkways and driveways and fix any cracks.  · Remove high doorway thresholds.  · Trim any shrubbery on the main path into your home.  · Regularly check that handrails are securely fastened and in good repair. Both sides of any steps should have handrails.  · Install guardrails along the edges of any raised decks or  porches.  · Clear walkways of debris and clutter, including tools and rocks.  · Have leaves, snow, and ice cleared regularly.  · Use sand or salt on walkways during winter months.  · In the garage, clean up any spills right away, including grease or oil spills.  What other actions can I take?  · Wear closed-toe shoes that fit well and support your feet. Wear shoes that have rubber soles or low heels.  · Use mobility aids as needed, such as canes, walkers, scooters, and crutches.  · Review your medicines with your health care provider. Some medicines can cause dizziness or changes in blood pressure, which increase your risk of falling.  Talk with your health care provider about other ways that you can decrease your risk of falls. This may include working with a physical therapist or  to improve your strength, balance, and endurance.  Where to find more information  · Centers for Disease Control and Prevention, STEAD[place]: https://www.cdc.gov  · National [place] on Aging: https://gu8wnqj.thomas.nih.gov  Contact a health care provider if:  · You are afraid of falling at home.  · You feel weak, drowsy, or dizzy at home.  · You fall at home.  Summary  · There are many simple things that you can do to make your home safe and to help prevent falls.  · Ways to make your home safe include removing tripping hazards and installing grab bars in the bathroom.  · Ask for help when making these changes in your home.  This information is not intended to replace advice given to you by your health care provider. Make sure you discuss any questions you have with your health care provider.  Document Released: 12/08/2003 Document Revised: 08/02/2018 Document Reviewed: 08/02/2018  [employer] Interactive Patient Education © 2019 [employer]    Rosuvastatin Tablets  What is this medicine?  ROSUVASTATIN (ronnie RATNA va sta tin) is known as a HMG-CoA reductase inhibitor or 'statin'. It lowers cholesterol and triglycerides in the blood. This  drug may also reduce the risk of heart attack, stroke, or other health problems in patients with risk factors for heart disease. Diet and lifestyle changes are often used with this drug.  This medicine may be used for other purposes; ask your health care provider or pharmacist if you have questions.  COMMON BRAND NAME(S): Crestor  What should I tell my health care provider before I take this medicine?  They need to know if you have any of these conditions:  -diabetes  -if you often drink alcohol  -history of stroke  -kidney disease  -liver disease  -muscle aches or weakness  -thyroid disease  -an unusual or allergic reaction to rosuvastatin, other medicines, foods, dyes, or preservatives  -pregnant or trying to get pregnant  -breast-feeding  How should I use this medicine?  Take this medicine by mouth with a glass of water. Follow the directions on the prescription label. Do not cut, crush or chew this medicine. You can take this medicine with or without food. Take your doses at regular intervals. Do not take your medicine more often than directed.  Talk to your pediatrician regarding the use of this medicine in children. While this drug may be prescribed for children as young as 7 years old for selected conditions, precautions do apply.  Overdosage: If you think you have taken too much of this medicine contact a poison control center or emergency room at once.  NOTE: This medicine is only for you. Do not share this medicine with others.  What if I miss a dose?  If you miss a dose, take it as soon as you can. If your next dose is to be taken in less than 12 hours, then do not take the missed dose. Take the next dose at your regular time. Do not take double or extra doses.  What may interact with this medicine?  Do not take this medicine with any of the following medications:  -herbal medicines like red yeast rice  This medicine may also interact with the following medications:  -alcohol  -antacids containing  aluminum hydroxide or magnesium hydroxide  -cyclosporine  -other medicines for high cholesterol  -some medicines for HIV infection  -warfarin  This list may not describe all possible interactions. Give your health care provider a list of all the medicines, herbs, non-prescription drugs, or dietary supplements you use. Also tell them if you smoke, drink alcohol, or use illegal drugs. Some items may interact with your medicine.  What should I watch for while using this medicine?  Visit your doctor or health care professional for regular check-ups. You may need regular tests to make sure your liver is working properly.  Your health care professional may tell you to stop taking this medicine if you develop muscle problems. If your muscle problems do not go away after stopping this medicine, contact your health care professional.  Do not become pregnant while taking this medicine. Women should inform their health care professional if they wish to become pregnant or think they might be pregnant. There is a potential for serious side effects to an unborn child. Talk to your health care professional or pharmacist for more information. Do not breast-feed an infant while taking this medicine.  This medicine may affect blood sugar levels. If you have diabetes, check with your doctor or health care professional before you change your diet or the dose of your diabetic medicine.  If you are going to need surgery or other procedure, tell your doctor that you are using this medicine.  This drug is only part of a total heart-health program. Your doctor or a dietician can suggest a low-cholesterol and low-fat diet to help. Avoid alcohol and smoking, and keep a proper exercise schedule.  This medicine may cause a decrease in Co-Enzyme Q-10. You should make sure that you get enough Co-Enzyme Q-10 while you are taking this medicine. Discuss the foods you eat and the vitamins you take with your health care professional.  What side  effects may I notice from receiving this medicine?  Side effects that you should report to your doctor or health care professional as soon as possible:  -allergic reactions like skin rash, itching or hives, swelling of the face, lips, or tongue  -dark urine  -fever  -joint pain  -muscle cramps, pain  -redness, blistering, peeling or loosening of the skin, including inside the mouth  -trouble passing urine or change in the amount of urine  -unusually weak or tired  -yellowing of the eyes or skin  Side effects that usually do not require medical attention (report to your doctor or health care professional if they continue or are bothersome):  -constipation  -heartburn  -nausea  -stomach gas, pain, upset  This list may not describe all possible side effects. Call your doctor for medical advice about side effects. You may report side effects to FDA at 9-402-FDA-8400.  Where should I keep my medicine?  Keep out of the reach of children.  Store at room temperature between 20 and 25 degrees C (68 and 77 degrees F). Keep container tightly closed (protect from moisture). Throw away any unused medicine after the expiration date.  NOTE: This sheet is a summary. It may not cover all possible information. If you have questions about this medicine, talk to your doctor, pharmacist, or health care provider.  © 2019 [employer] (2018-08-21 12:42:43)

## 2019-07-24 ENCOUNTER — OFFICE VISIT (OUTPATIENT)
Dept: FAMILY MEDICINE CLINIC | Facility: CLINIC | Age: 79
End: 2019-07-24

## 2019-07-24 VITALS
RESPIRATION RATE: 16 BRPM | WEIGHT: 116 LBS | DIASTOLIC BLOOD PRESSURE: 70 MMHG | OXYGEN SATURATION: 99 % | HEIGHT: 62 IN | HEART RATE: 57 BPM | BODY MASS INDEX: 21.35 KG/M2 | SYSTOLIC BLOOD PRESSURE: 140 MMHG

## 2019-07-24 DIAGNOSIS — F33.42 RECURRENT MAJOR DEPRESSIVE DISORDER, IN FULL REMISSION (HCC): ICD-10-CM

## 2019-07-24 DIAGNOSIS — S76.312A HAMSTRING STRAIN, LEFT, INITIAL ENCOUNTER: Primary | ICD-10-CM

## 2019-07-24 DIAGNOSIS — I10 ESSENTIAL HYPERTENSION: ICD-10-CM

## 2019-07-24 DIAGNOSIS — G45.9 TIA (TRANSIENT ISCHEMIC ATTACK): ICD-10-CM

## 2019-07-24 DIAGNOSIS — F43.21 GRIEF REACTION: ICD-10-CM

## 2019-07-24 PROCEDURE — 99213 OFFICE O/P EST LOW 20 MIN: CPT | Performed by: FAMILY MEDICINE

## 2019-07-24 RX ORDER — CLOPIDOGREL BISULFATE 75 MG/1
75 TABLET ORAL DAILY
Qty: 90 TABLET | Refills: 1 | Status: SHIPPED | OUTPATIENT
Start: 2019-07-24 | End: 2019-12-30 | Stop reason: SDUPTHER

## 2019-07-24 RX ORDER — CITALOPRAM 20 MG/1
20 TABLET ORAL DAILY
Qty: 90 TABLET | Refills: 1 | Status: SHIPPED | OUTPATIENT
Start: 2019-07-24 | End: 2019-12-30 | Stop reason: SDUPTHER

## 2019-07-24 NOTE — PROGRESS NOTES
"Rooming Tab(CC,VS,Pt Hx,Fall Screen)  Chief Complaint   Patient presents with   • Hypertension      follow up    • Leg Pain      right leg to hip       Subjective   Patient is here because of hamstring strain.  She was getting out of her car and after twisting and putting all of her weight on the left leg she felt some intense pain in the left hamstring area.  It sometimes interrupts her from sleep.  It has not shown much improvement over the last week.    She also needs refills of citalopram and clopidogrel.  Next appointment scheduled should be about the end of December.  I have reviewed and updated her medications, medical history and problem list during today's office visit.     Patient Care Team:  Anoop Appiah MD as PCP - General (Family Medicine)  Anoop Appiah MD as PCP - [facility]  Judah Carolina II, MD as Consulting Physician (Neurology)  Sandy Espinoza MD as Consulting Physician (Endocrinology)  Yang Pritchett MD as Surgeon (General Surgery)  Misha Riley MD as Consulting Physician (Hematology)  Adrianna Prasad Jr., MD as Consulting Physician (Vascular Surgery)  Reena Dodson MD as Consulting Physician (Obstetrics and Gynecology)    Problem List Tab  Medications Tab  Synopsis Tab  Chart Review Tab  Care Everywhere Tab  Immunizations Tab  Patient History Tab    Social History     Tobacco Use   • Smoking status: Former Smoker     Years: 5.00   • Smokeless tobacco: Never Used   Substance Use Topics   • Alcohol use: Yes     Frequency: Monthly or less     Drinks per session: 1 or 2       Review of Systems   Musculoskeletal:        See HPI       Objective     Rooming Tab(CC,VS,Pt Hx,Fall Screen)  /70   Pulse 57   Resp 16   Ht 157.5 cm (62.01\")   Wt 52.6 kg (116 lb)   SpO2 99%   BMI 21.21 kg/m²     Body mass index is 21.21 kg/m².    Physical Exam   Constitutional: She appears well-developed. No distress.   Musculoskeletal:        Left upper leg: She exhibits " tenderness. She exhibits no swelling and no edema.   Psychiatric: She has a normal mood and affect. Her speech is normal. She is attentive.        Statin Decision Aid  Data Reviewed:                   Assessment/Plan   Order Review Tab  Health Maintenance Tab  Patient Plan/Order Tab  Diagnoses and all orders for this visit:    1. Hamstring strain, left, initial encounter (Primary)  -     Ambulatory Referral to Physical Therapy Evaluate and treat    2. Essential hypertension    3. Grief reaction  Comments:  Steven()  2016  Orders:  -     citalopram (CeleXA) 20 MG tablet; Take 1 tablet by mouth Daily.  Dispense: 90 tablet; Refill: 1    4. Recurrent major depressive disorder, in full remission (CMS/HCC)  -     citalopram (CeleXA) 20 MG tablet; Take 1 tablet by mouth Daily.  Dispense: 90 tablet; Refill: 1    5. History of TIA (transient ischemic attack)  -     clopidogrel (PLAVIX) 75 MG tablet; Take 1 tablet by mouth Daily for 180 days. Take 1 po every 3rd day  Dispense: 90 tablet; Refill: 1        Wrapup Tab  Return in about 5 months (around 2019) for Recheck.

## 2019-07-30 ENCOUNTER — APPOINTMENT (OUTPATIENT)
Dept: WOMENS IMAGING | Facility: HOSPITAL | Age: 79
End: 2019-07-30

## 2019-07-30 PROCEDURE — 77063 BREAST TOMOSYNTHESIS BI: CPT | Performed by: RADIOLOGY

## 2019-07-30 PROCEDURE — 77080 DXA BONE DENSITY AXIAL: CPT | Performed by: RADIOLOGY

## 2019-07-30 PROCEDURE — 77067 SCR MAMMO BI INCL CAD: CPT | Performed by: RADIOLOGY

## 2019-07-31 ENCOUNTER — TREATMENT (OUTPATIENT)
Dept: PHYSICAL THERAPY | Facility: CLINIC | Age: 79
End: 2019-07-31

## 2019-07-31 DIAGNOSIS — S76.312A HAMSTRING STRAIN, LEFT, INITIAL ENCOUNTER: Primary | ICD-10-CM

## 2019-07-31 PROCEDURE — 97161 PT EVAL LOW COMPLEX 20 MIN: CPT | Performed by: PHYSICAL THERAPIST

## 2019-07-31 NOTE — PROGRESS NOTES
Physical Therapy Initial Evaluation and Plan of Care    Patient: Sandy Varela   : 1940  Diagnosis/ICD-10 Code:  Hamstring strain, left, initial encounter [S76.312A]  Referring practitioner: Anoop Appiah MD    Subjective Evaluation    History of Present Illness  Date of onset: 2019    Subjective comment: Pt reports she was getting out of her car and immediate pain in L LE behind her knee goes to her hip. Pt reports she has difficulty standing, performing sit-stand transfers and walking due to pain   Patient Occupation: Retired  Quality of life: excellent    Pain  Current pain ratin  At best pain ratin  At worst pain ratin  Location: L knee/hip   Quality: dull ache, pulling and tight  Relieving factors: medications and rest  Progression: improved    Social Support  Lives in: multiple-level home  Lives with: alone    Hand dominance: right    Diagnostic Tests  No diagnostic tests performed    Treatments  Previous treatment: medication  Current treatment: physical therapy  Patient Goals  Patient goals for therapy: decreased pain and increased strength             Objective       Static Posture   General Observations  Guarded.     Shoulders  Asymmetric shoulders and rounded.    Thoracic Spine  Hyperkyphosis.    Knee   Knee (Left): Flexed.     Palpation   Left   Tenderness of the adductor brevis, adductor longus, gluteus medius, proximal biceps femoris, proximal semimembranosus, proximal semitendinosus, rectus femoris and TFL.     Tenderness     Left Hip   Tenderness in the PSIS.     Additional Tenderness Details  TTP L Piriformis     Active Range of Motion     Additional Active Range of Motion Details  Pt heavily guarded with L HIP and Knee AROM and PROM  B Hamstring tightness: measured in 90/90 supine: L 55 degrees and R 45 degrees       Strength/Myotome Testing     Left Hip   Planes of Motion   Flexion: 4  Abduction: 4  Adduction: 4    Right Hip   Planes of Motion   Flexion: 4+  Abduction:  5  Adduction: 5    Additional Strength Details  L knee flexion and extension strength 4/5 with pain.     Ambulation     Observational Gait   Gait: antalgic   Increased right stance time. Decreased walking speed, stride length, left stance time, left swing time, left step length and right step length.   Left foot contact pattern: foot flat  Right foot contact pattern: foot flat  Left arm swing: decreased  Right arm swing: decreased  Base of support: decreased    Quality of Movement During Gait   Trunk  Forward lean.   Trunk (Right): Positive lateral lean over stance limb.     Hip    Hip (Right): Positive hike.          Assessment & Plan     Assessment  Impairments: abnormal gait, abnormal or restricted ROM, activity intolerance, impaired balance, impaired physical strength, lacks appropriate home exercise program, pain with function and weight-bearing intolerance  Assessment details: Pt signs and symptoms consistent with hamstring strain. Pt would benefit from skilled PT intervention to address functional deficits noted and return to PLOF.   Prognosis: good  Functional Limitations: sleeping, walking, uncomfortable because of pain, moving in bed, sitting, standing and stooping  Goals  Plan Goals: SHORT TERM GOALS: 4 visits  1. Pt will be compliant with HEP  2. Pt will be able to perform sit-stand transfer with decreased c/o pain to 3/10     LONG TERM GOALS: 16 visits   1. Pt will have improved score on LEFI score >or= to 60/80  2. Pt will have normal gait pattern  3. Pt will be able to perform transfers, negotiate stairs and walk without pain.       Plan  Therapy options: will be seen for skilled physical therapy services  Planned modality interventions: high voltage pulsed current (pain management), thermotherapy (hydrocollator packs) and ultrasound  Planned therapy interventions: balance/weight-bearing training, flexibility, functional ROM exercises, gait training, home exercise program, manual therapy,  neuromuscular re-education, soft tissue mobilization, strengthening, stretching, transfer training and therapeutic activities  Duration in visits: 16  Treatment plan discussed with: patient        Manual Therapy:          mins  14570;  Therapeutic Exercise:          mins  61736;     Neuromuscular Megan:         mins  76293;    Therapeutic Activity:           mins  89066;     Gait Training:            mins  92702;     Ultrasound:           mins  15503;    Electrical Stimulation:          mins  79140 ( );  Dry Needling           mins self-pay  Traction           mins 29024  Canalith Repositioning         mins 19048      Timed Treatment:      mins   Total Treatment:     60  mins    PT SIGNATURE: Alice Luna PT   [place] Lic #[id number]    DATE TREATMENT INITIATED: 7/31/2019    Initial Certification  Certification Period: 10/29/2019  I certify that the therapy services are furnished while this patient is under my care.  The services outlined above are required by this patient, and will be reviewed every 90 days.     PHYSICIAN: Anoop Appiah MD      DATE:     Please sign and return via fax to [phone].. Thank you, [facility].

## 2019-07-31 NOTE — PATIENT INSTRUCTIONS
HEP ISSUED:    Access Code: [email or web address]   URL: [email or web address]   Date: 07/31/2019   Prepared by: Alice Luna     Exercises   Seated March - 10 reps - 3 sets - 1x daily - 7x weekly   Supine Heel Slide - 10 reps - 3 sets - 1x daily - 7x weekly   Supine Hip Abduction - 10 reps - 3 sets - 1x daily - 7x weekly   Supine Hip Abduction AROM - 10 reps - 3 sets - 1x daily - 7x weekly

## 2019-08-02 ENCOUNTER — TREATMENT (OUTPATIENT)
Dept: PHYSICAL THERAPY | Facility: CLINIC | Age: 79
End: 2019-08-02

## 2019-08-02 DIAGNOSIS — S76.312A HAMSTRING STRAIN, LEFT, INITIAL ENCOUNTER: Primary | ICD-10-CM

## 2019-08-02 PROCEDURE — 97110 THERAPEUTIC EXERCISES: CPT | Performed by: PHYSICAL THERAPIST

## 2019-08-02 NOTE — PROGRESS NOTES
Physical Therapy Daily Progress Note  Visit: 2    Sandy Varela reports: Pt rates pain at 5/10 today. Reports compliance with HEP     Subjective     Objective   See Exercise, Manual, and Modality Logs for complete treatment.       Assessment/Plan: Pt tolerated new there ex well today. Reported improvement in symptoms post treatment. Noted less guarded and improved gait pattern today./ continue to progress as tolerated.     Manual Therapy:     5     mins  65671;  Therapeutic Exercise:     25     mins  06506;     Neuromuscular Megan:         mins  93020;    Therapeutic Activity:           mins  06580;     Gait Training:            mins  96625;     Ultrasound:           mins  68900;    Electrical Stimulation:          mins  56411 ( );  Dry Needling           mins self-pay  Traction           mins 84272  Canalith Repositioning        mins 02166      Timed Treatment: 30    mins   Total Treatment:     40   mins    Alice Luna PT  [place] License #: [id number]    Physical Therapist

## 2019-08-02 NOTE — PATIENT INSTRUCTIONS
HEP issued:     Access Code: [email or web address]   URL: [email or web address]   Date: 08/02/2019   Prepared by: Alice Luna     Exercises   Standing Hip Abduction with Counter Support - 10 reps - 3 sets - 1x daily - 7x weekly   Standing Hip Extension with Counter Support - 10 reps - 3 sets - 1x daily - 7x weekly   Standing Knee Flexion with Counter Support - 10 reps - 3 sets - 1x daily - 7x weekly   Supine Single Knee to Chest Stretch - 10 reps - 3 sets - 1x daily - 7x weekly

## 2019-08-05 ENCOUNTER — TREATMENT (OUTPATIENT)
Dept: PHYSICAL THERAPY | Facility: CLINIC | Age: 79
End: 2019-08-05

## 2019-08-05 DIAGNOSIS — S76.312A HAMSTRING STRAIN, LEFT, INITIAL ENCOUNTER: Primary | ICD-10-CM

## 2019-08-05 PROCEDURE — 97110 THERAPEUTIC EXERCISES: CPT | Performed by: PHYSICAL THERAPIST

## 2019-08-05 NOTE — PROGRESS NOTES
Physical Therapy Daily Progress Note  Visit: 3    Sandy Varela reports: her L leg is feeling better and reports compliance with HEP    Subjective     Objective   See Exercise, Manual, and Modality Logs for complete treatment.       Assessment/Plan: Progressing as evidenced by pt reports of less pain. Noted improved gait pattern and less guarding with movements./ Continue to progress as tolerated     Manual Therapy:          mins  93761;  Therapeutic Exercise:   30       mins  17535;     Neuromuscular Megan:         mins  79360;    Therapeutic Activity:           mins  40412;     Gait Training:            mins  49202;     Ultrasound:           mins  92527;    Electrical Stimulation:          mins  46262 ( );  Dry Needling           mins self-pay  Traction           mins 15018  Canalith Repositioning         mins 45452      Timed Treatment:   30   mins   Total Treatment:     40   mins    Alice Luna, JENNIFER  [place] License #: [id number]    Physical Therapist

## 2019-08-09 ENCOUNTER — TREATMENT (OUTPATIENT)
Dept: PHYSICAL THERAPY | Facility: CLINIC | Age: 79
End: 2019-08-09

## 2019-08-09 DIAGNOSIS — S76.312A HAMSTRING STRAIN, LEFT, INITIAL ENCOUNTER: Primary | ICD-10-CM

## 2019-08-09 PROCEDURE — 97110 THERAPEUTIC EXERCISES: CPT | Performed by: PHYSICAL THERAPIST

## 2019-08-09 NOTE — PROGRESS NOTES
Physical Therapy Daily Progress Note  Visit: 4    Sandy Varela reports: her leg is better and has a minimal pain in the back of her L knee at the hamstring attachment     Subjective     Objective   See Exercise, Manual, and Modality Logs for complete treatment.       Assessment/Plan pt progressing well as evidenced by reports of decreased pain and ability to tolerate progression of there ex./ continue to progress as tolerated.     Manual Therapy:          mins  53427;  Therapeutic Exercise:    40      mins  99633;     Neuromuscular Megan:         mins  08492;    Therapeutic Activity:           mins  60518;     Gait Training:            mins  51254;     Ultrasound:           mins  34768;    Electrical Stimulation:          mins  26685 ( );  Dry Needling           mins self-pay  Traction           mins 40582  Canalith Repositioning         mins 40863      Timed Treatment:   40   mins   Total Treatment:    55    mins    JENNIFER Carmen License #: [id number]    Physical Therapist

## 2019-08-12 ENCOUNTER — TREATMENT (OUTPATIENT)
Dept: PHYSICAL THERAPY | Facility: CLINIC | Age: 79
End: 2019-08-12

## 2019-08-12 DIAGNOSIS — S76.312A HAMSTRING STRAIN, LEFT, INITIAL ENCOUNTER: ICD-10-CM

## 2019-08-12 PROCEDURE — 97110 THERAPEUTIC EXERCISES: CPT | Performed by: PHYSICAL THERAPIST

## 2019-08-12 NOTE — PROGRESS NOTES
Physical Therapy Daily Progress Note  Visit: 5    Sandy Varela reports: her leg is improving and has less pain. Reports compliance with HEP.      Subjective     Objective   See Exercise, Manual, and Modality Logs for complete treatment.       Assessment/Plan: normal gait pattern and tolerates there ex without difficulty/ Progressing as evidenced by reports of decreased pain and tolerance to progression of exercises. / progress as tolerated     Manual Therapy:          mins  33440;  Therapeutic Exercise:     38     mins  03027;     Neuromuscular Megan:         mins  73293;    Therapeutic Activity:           mins  18273;     Gait Training:            mins  14243;     Ultrasound:           mins  41958;    Electrical Stimulation:          mins  10768 ( );  Dry Needling           mins self-pay  Traction           mins 72238  Canalith Repositioning         mins 79536      Timed Treatment:  38    mins   Total Treatment:   45     mins    JENNIFER Carmen License #: [id number]    Physical Therapist

## 2019-08-14 NOTE — PROGRESS NOTES
Send a copy of the report to the patient. Tell her to walk 5-7 days a week for 30 minutes. Tell her to avoid caffeine. Tell her to stop smoking if this applies. Remind her to take 1200 mg of calcium and 800 IU of vitamin D3 daily ( example caltrate d 600/400 bid with breakfast and supper). Our next bone density will be due in 2 years.   Thanks, Dr. Appiah

## 2019-08-16 ENCOUNTER — TREATMENT (OUTPATIENT)
Dept: PHYSICAL THERAPY | Facility: CLINIC | Age: 79
End: 2019-08-16

## 2019-08-16 DIAGNOSIS — S76.312A HAMSTRING STRAIN, LEFT, INITIAL ENCOUNTER: Primary | ICD-10-CM

## 2019-08-16 PROCEDURE — 97110 THERAPEUTIC EXERCISES: CPT | Performed by: PHYSICAL THERAPIST

## 2019-08-16 NOTE — PROGRESS NOTES
Physical Therapy Daily Progress Note  Visit: 6    Sandy Varela reports: her L leg was aching on Wednesday but has no pain today     Subjective     Objective   See Exercise, Manual, and Modality Logs for complete treatment. Provided instruction in exercises and proper technique and purpose of exercises.         Assessment/Plan pt has progressed well as evidenced by no c/o pain and good tolerance to exercises and tolerance to progression of exercises. / continue to progress as tolerated.     Manual Therapy:          mins  55578;  Therapeutic Exercise:     38     mins  11265;     Neuromuscular Megan:         mins  97564;    Therapeutic Activity:           mins  23676;     Gait Training:            mins  53446;     Ultrasound:           mins  93233;    Electrical Stimulation:          mins  31927 ( );  Dry Needling           mins self-pay  Traction           mins 07831  Canalith Repositioning         mins 35082      Timed Treatment:  38    mins   Total Treatment:    38    mins    Alice Luna PT  [place] License #: [id number]    Physical Therapist

## 2019-08-19 ENCOUNTER — TREATMENT (OUTPATIENT)
Dept: PHYSICAL THERAPY | Facility: CLINIC | Age: 79
End: 2019-08-19

## 2019-08-19 DIAGNOSIS — S76.312A HAMSTRING STRAIN, LEFT, INITIAL ENCOUNTER: Primary | ICD-10-CM

## 2019-08-19 PROCEDURE — 97110 THERAPEUTIC EXERCISES: CPT | Performed by: PHYSICAL THERAPIST

## 2019-08-19 NOTE — PROGRESS NOTES
"Physical Therapy Daily Progress Note  Visit: 7    Sandy Varela reports: \"my knee was achy yesterday but that's to be expected\".  Reports no pain today.    Subjective     Objective   See Exercise, Manual, and Modality Logs for complete treatment. Provided instruction in exercises and proper technique and purpose of exercises.         Assessment/Plan pt has progress well as evidenced by decreased reports of pain and ability to tolerate progression of exercises and tolerates without difficulty./ Plan to DC after next visit      Manual Therapy:          mins  03841;  Therapeutic Exercise:   40       mins  65030;     Neuromuscular Megan:         mins  36790;    Therapeutic Activity:           mins  64605;     Gait Training:            mins  04180;     Ultrasound:           mins  50731;    Electrical Stimulation:          mins  97842 ( );  Dry Needling           mins self-pay  Traction           mins 53155  Canalith Repositioning         mins 05248      Timed Treatment:   40   mins   Total Treatment:    40    mins    Alice Luna PT  [place] License #: [id number]    Physical Therapist    "

## 2019-08-23 ENCOUNTER — TREATMENT (OUTPATIENT)
Dept: PHYSICAL THERAPY | Facility: CLINIC | Age: 79
End: 2019-08-23

## 2019-08-23 DIAGNOSIS — S76.312A HAMSTRING STRAIN, LEFT, INITIAL ENCOUNTER: Primary | ICD-10-CM

## 2019-08-23 PROCEDURE — 97110 THERAPEUTIC EXERCISES: CPT | Performed by: PHYSICAL THERAPIST

## 2019-08-23 NOTE — PROGRESS NOTES
Physical Therapy Daily Progress Note/ Discharge Note  Initial Visit 7/31/19  Visit: 8    Sandy Varela reports: she is feeling much better and has no pain in her hamstring     Subjective     Objective   See Exercise, Manual, and Modality Logs for complete treatment.     Goals  Plan Goals: SHORT TERM GOALS: 4 visits  1. Pt will be compliant with HEP MET  2. Pt will be able to perform sit-stand transfer with decreased c/o pain to 3/10 MET    LONG TERM GOALS: 16 visits   1. Pt will have improved score on LEFI score >or= to 60/80 Not Met   2. Pt will have normal gait pattern MET   3. Pt will be able to perform transfers, negotiate stairs and walk without pain. MET     Assessment/Plan Pt has progressed well and reports no pain and is I with HEP. Pt presents functionally a higher score than what she reported on her final LEFI score, likely possible confusion about what the numbers represent.   / DC PT and continue HEP     Manual Therapy:          mins  88683;  Therapeutic Exercise:    25      mins  52777;     Neuromuscular Megan:         mins  58513;    Therapeutic Activity:           mins  89801;     Gait Training:            mins  32875;     Ultrasound:           mins  36561;    Electrical Stimulation:          mins  26850 ( );  Dry Needling           mins self-pay  Traction           mins 31345  Canalith Repositioning         mins 20513      Timed Treatment: 25     mins   Total Treatment:    25    mins    Alice Luna PT  [place] License #: [id number]    Physical Therapist

## 2019-10-23 ENCOUNTER — OFFICE VISIT (OUTPATIENT)
Dept: FAMILY MEDICINE CLINIC | Facility: CLINIC | Age: 79
End: 2019-10-23

## 2019-10-23 VITALS
HEART RATE: 66 BPM | BODY MASS INDEX: 21.53 KG/M2 | SYSTOLIC BLOOD PRESSURE: 135 MMHG | RESPIRATION RATE: 14 BRPM | DIASTOLIC BLOOD PRESSURE: 72 MMHG | TEMPERATURE: 97.7 F | OXYGEN SATURATION: 99 % | WEIGHT: 117 LBS | HEIGHT: 62 IN

## 2019-10-23 DIAGNOSIS — M54.2 CERVICALGIA: Primary | ICD-10-CM

## 2019-10-23 PROCEDURE — 99214 OFFICE O/P EST MOD 30 MIN: CPT | Performed by: FAMILY MEDICINE

## 2019-10-23 PROCEDURE — 72050 X-RAY EXAM NECK SPINE 4/5VWS: CPT | Performed by: FAMILY MEDICINE

## 2019-10-23 RX ORDER — CYCLOBENZAPRINE HCL 10 MG
10 TABLET ORAL NIGHTLY PRN
Qty: 30 TABLET | Refills: 2 | Status: SHIPPED | OUTPATIENT
Start: 2019-10-23 | End: 2019-12-30
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
## 2019-10-28 DIAGNOSIS — I10 ESSENTIAL HYPERTENSION: ICD-10-CM

## 2019-10-28 RX ORDER — OLMESARTAN MEDOXOMIL 40 MG/1
TABLET ORAL
Qty: 90 TABLET | Refills: 1 | Status: SHIPPED | OUTPATIENT
Start: 2019-10-28 | End: 2019-12-30 | Stop reason: SDUPTHER

## 2019-11-28 ENCOUNTER — RESULTS ENCOUNTER (OUTPATIENT)
Dept: FAMILY MEDICINE CLINIC | Facility: CLINIC | Age: 79
End: 2019-11-28

## 2019-11-28 DIAGNOSIS — Z13.6 SCREENING FOR ISCHEMIC HEART DISEASE: ICD-10-CM

## 2019-11-28 DIAGNOSIS — I10 ESSENTIAL HYPERTENSION: ICD-10-CM

## 2019-12-17 ENCOUNTER — TELEPHONE (OUTPATIENT)
Dept: FAMILY MEDICINE CLINIC | Facility: CLINIC | Age: 79
End: 2019-12-17

## 2019-12-30 ENCOUNTER — OFFICE VISIT (OUTPATIENT)
Dept: FAMILY MEDICINE CLINIC | Facility: CLINIC | Age: 79
End: 2019-12-30

## 2019-12-30 VITALS
HEIGHT: 62 IN | HEART RATE: 72 BPM | BODY MASS INDEX: 21.35 KG/M2 | TEMPERATURE: 98.9 F | OXYGEN SATURATION: 98 % | WEIGHT: 116 LBS | SYSTOLIC BLOOD PRESSURE: 141 MMHG | RESPIRATION RATE: 16 BRPM | DIASTOLIC BLOOD PRESSURE: 80 MMHG

## 2019-12-30 DIAGNOSIS — N39.46 MIXED STRESS AND URGE URINARY INCONTINENCE: ICD-10-CM

## 2019-12-30 DIAGNOSIS — F33.42 RECURRENT MAJOR DEPRESSIVE DISORDER, IN FULL REMISSION (HCC): ICD-10-CM

## 2019-12-30 DIAGNOSIS — I10 ESSENTIAL HYPERTENSION: ICD-10-CM

## 2019-12-30 DIAGNOSIS — J10.1 INFLUENZA DUE TO INFLUENZA VIRUS, TYPE A, HUMAN: ICD-10-CM

## 2019-12-30 DIAGNOSIS — R68.89 FLU-LIKE SYMPTOMS: Primary | ICD-10-CM

## 2019-12-30 DIAGNOSIS — G45.9 TIA (TRANSIENT ISCHEMIC ATTACK): ICD-10-CM

## 2019-12-30 DIAGNOSIS — F43.21 GRIEF REACTION: ICD-10-CM

## 2019-12-30 LAB
ALBUMIN SERPL-MCNC: 4.5 G/DL (ref 3.5–5.2)
ALBUMIN/GLOB SERPL: 1.6 G/DL
ALP SERPL-CCNC: 84 U/L (ref 39–117)
ALT SERPL-CCNC: 17 U/L (ref 1–33)
AST SERPL-CCNC: 25 U/L (ref 1–32)
BASOPHILS # BLD MANUAL: 0.05 10*3/MM3 (ref 0–0.2)
BASOPHILS NFR BLD MANUAL: 1 % (ref 0–1.5)
BILIRUB SERPL-MCNC: 0.3 MG/DL (ref 0.2–1.2)
BUN SERPL-MCNC: 11 MG/DL (ref 8–23)
BUN/CREAT SERPL: 10.9 (ref 7–25)
CALCIUM SERPL-MCNC: 9.1 MG/DL (ref 8.6–10.5)
CHLORIDE SERPL-SCNC: 98 MMOL/L (ref 98–107)
CHOLEST SERPL-MCNC: 156 MG/DL (ref 0–200)
CHOLEST/HDLC SERPL: 2.26 {RATIO}
CO2 SERPL-SCNC: 28.5 MMOL/L (ref 22–29)
CREAT SERPL-MCNC: 1.01 MG/DL (ref 0.57–1)
DIFFERENTIAL COMMENT: ABNORMAL
EOSINOPHIL # BLD MANUAL: 0.11 10*3/MM3 (ref 0–0.4)
EOSINOPHIL NFR BLD MANUAL: 2.1 % (ref 0.3–6.2)
ERYTHROCYTE [DISTWIDTH] IN BLOOD BY AUTOMATED COUNT: 12.7 % (ref 12.3–15.4)
EXPIRATION DATE: ABNORMAL
FLUAV AG NPH QL: POSITIVE
FLUBV AG NPH QL: NEGATIVE
GLOBULIN SER CALC-MCNC: 2.9 GM/DL
GLUCOSE SERPL-MCNC: 103 MG/DL (ref 65–99)
HCT VFR BLD AUTO: 34.3 % (ref 34–46.6)
HDLC SERPL-MCNC: 69 MG/DL (ref 40–60)
HGB BLD-MCNC: 11.2 G/DL (ref 12–15.9)
INTERNAL CONTROL: ABNORMAL
LDLC SERPL CALC-MCNC: 75 MG/DL (ref 0–100)
LYMPHOCYTES # BLD MANUAL: 0.49 10*3/MM3 (ref 0.7–3.1)
LYMPHOCYTES NFR BLD MANUAL: 9.4 % (ref 19.6–45.3)
Lab: ABNORMAL
MCH RBC QN AUTO: 27.1 PG (ref 26.6–33)
MCHC RBC AUTO-ENTMCNC: 32.7 G/DL (ref 31.5–35.7)
MCV RBC AUTO: 83.1 FL (ref 79–97)
MONOCYTES # BLD MANUAL: 0.49 10*3/MM3 (ref 0.1–0.9)
MONOCYTES NFR BLD MANUAL: 9.4 % (ref 5–12)
NEUTROPHILS # BLD MANUAL: 4.05 10*3/MM3 (ref 1.7–7)
NEUTROPHILS NFR BLD MANUAL: 78.1 % (ref 42.7–76)
NRBC BLD AUTO-RTO: 0 /100 WBC (ref 0–0.2)
PLATELET # BLD AUTO: 188 10*3/MM3 (ref 140–450)
PLATELET BLD QL SMEAR: ABNORMAL
POTASSIUM SERPL-SCNC: 4.3 MMOL/L (ref 3.5–5.2)
PROT SERPL-MCNC: 7.4 G/DL (ref 6–8.5)
RBC # BLD AUTO: 4.13 10*6/MM3 (ref 3.77–5.28)
RBC MORPH BLD: ABNORMAL
SODIUM SERPL-SCNC: 140 MMOL/L (ref 136–145)
TRIGL SERPL-MCNC: 61 MG/DL (ref 0–150)
VLDLC SERPL CALC-MCNC: 12.2 MG/DL
WBC # BLD AUTO: 5.19 10*3/MM3 (ref 3.4–10.8)

## 2019-12-30 PROCEDURE — 87804 INFLUENZA ASSAY W/OPTIC: CPT | Performed by: FAMILY MEDICINE

## 2019-12-30 PROCEDURE — 99214 OFFICE O/P EST MOD 30 MIN: CPT | Performed by: FAMILY MEDICINE

## 2019-12-30 RX ORDER — OXYBUTYNIN CHLORIDE 10 MG/1
10 TABLET, EXTENDED RELEASE ORAL DAILY
Qty: 90 TABLET | Refills: 1 | Status: SHIPPED | OUTPATIENT
Start: 2019-12-30 | End: 2020-07-22 | Stop reason: SDUPTHER

## 2019-12-30 RX ORDER — OLMESARTAN MEDOXOMIL 40 MG/1
40 TABLET ORAL DAILY
Qty: 90 TABLET | Refills: 1 | Status: SHIPPED | OUTPATIENT
Start: 2019-12-30 | End: 2020-07-22 | Stop reason: SDUPTHER

## 2019-12-30 RX ORDER — CITALOPRAM 20 MG/1
20 TABLET ORAL DAILY
Qty: 90 TABLET | Refills: 1 | Status: SHIPPED | OUTPATIENT
Start: 2019-12-30 | End: 2020-07-02 | Stop reason: SDUPTHER

## 2019-12-30 RX ORDER — CLOPIDOGREL BISULFATE 75 MG/1
75 TABLET ORAL DAILY
Qty: 90 TABLET | Refills: 1 | Status: SHIPPED | OUTPATIENT
Start: 2019-12-30 | End: 2020-07-22 | Stop reason: SDUPTHER

## 2019-12-30 NOTE — PROGRESS NOTES
"   Subjective       Chief Complaint   Patient presents with   • Hypertension     5 mo follow up    • Cough   • Shortness of Breath         History of Present Illness   Sandy Varela is a 79 y.o. female who presents to the office today for medication refill.  Blood pressure stable.  She is ill with flulike symptoms.  She continues to take GERRY [place] L for history of TIA.  She continues to take oxybutynin for mixed stress and urge continence.  Citalopram is useful for issues regarding grief reaction.  Over the past 6 days she has had flulike symptoms.  Several family members tested positive for flu.  She has been coughing mostly nonproductive over the past 2 3 days.  Fever-like symptoms and aches.  She is getting some help with over-the-counter medications which we reviewed.    I have reviewed and updated her medications, medical history and problem list during today's office visit.     Social History     Tobacco Use   • Smoking status: Former Smoker     Years: 5.00   • Smokeless tobacco: Never Used   Substance Use Topics   • Alcohol use: Yes     Frequency: Monthly or less     Drinks per session: 1 or 2     Comment: Ocassionally on holidays        Review of Systems   Constitutional: Positive for chills and fever.   Respiratory: Positive for cough.    Musculoskeletal: Positive for arthralgias.         Physical Examination:   Objective   /80   Pulse 72   Temp 98.9 °F (37.2 °C) (Oral)   Resp 16   Ht 157.5 cm (62.01\")   Wt 52.6 kg (116 lb)   SpO2 98%   BMI 21.21 kg/m²     Body mass index is 21.21 kg/m².    Physical Exam   Constitutional: She appears well-developed. She appears ill. No distress.   Cardiovascular: Normal rate, regular rhythm and normal heart sounds.   Pulmonary/Chest: Effort normal and breath sounds normal.   Cough spasm in office x 1   Skin: Skin is warm.   Psychiatric: She has a normal mood and affect. Her speech is normal and behavior is normal. She is attentive.        Data Reviewed:    "     INFLUENZA NASAL SWAB:  Lab Results   Component Value Date    RAPFLUA Positive (A) 2019    RAPFLUB Negative 2019                 Assessment/Plan:   Assessment/Plan   Diagnoses and all orders for this visit:    1. Flu-like symptoms (Primary)  -     POC Influenza A / B    2. Grief reaction  Comments:  Steven()  2016  Orders:  -     citalopram (CeleXA) 20 MG tablet; Take 1 tablet by mouth Daily.  Dispense: 90 tablet; Refill: 1    3. Recurrent major depressive disorder, in full remission (CMS/[facility])  -     citalopram (CeleXA) 20 MG tablet; Take 1 tablet by mouth Daily.  Dispense: 90 tablet; Refill: 1    4. History of TIA (transient ischemic attack)  -     clopidogrel (PLAVIX) 75 MG tablet; Take 1 tablet by mouth Daily for 180 days. Take 1 po every 3rd day  Dispense: 90 tablet; Refill: 1    5. Essential hypertension  -     olmesartan (BENICAR) 40 MG tablet; Take 1 tablet by mouth Daily for 180 days.  Dispense: 90 tablet; Refill: 1    6. Mixed stress and urge urinary incontinence  -     oxybutynin XL (DITROPAN-XL) 10 MG 24 hr tablet; Take 1 tablet by mouth Daily for 180 days.  Dispense: 90 tablet; Refill: 1    7. Influenza due to influenza virus, type A, human  Comments:  sx care, tamiflu tx window has passed.       Patient Instructions         Follow up:   Return in about 6 months (around 2020).

## 2019-12-30 NOTE — PATIENT INSTRUCTIONS
Symptomatic treatment for influenza type A.  Continue Delsym for cough suppression.  It is okay to use up to 3000 mg of acetaminophen daily.  Continue fluids and rest.

## 2020-01-03 DIAGNOSIS — E78.49 OTHER HYPERLIPIDEMIA: ICD-10-CM

## 2020-01-03 RX ORDER — ROSUVASTATIN CALCIUM 5 MG/1
5 TABLET, COATED ORAL NIGHTLY
Qty: 90 TABLET | Refills: 1 | Status: SHIPPED | OUTPATIENT
Start: 2020-01-03 | End: 2020-09-08

## 2020-03-23 ENCOUNTER — DOCUMENTATION (OUTPATIENT)
Dept: PHYSICAL THERAPY | Facility: CLINIC | Age: 80
End: 2020-03-23

## 2020-03-23 NOTE — PROGRESS NOTES
Discharge Summary  Discharge Summary from Physical Therapy Report      Dates  PT visit:   Number of Visits: 8    Discharge Status of Patient: See MD Note dated 8/23/19    Goals: All Met    Discharge Plan: Continue with current home exercise program as instructed    Comments     Date of Discharge 8/23/19        Alice Luna, PT  Physical Therapist

## 2020-06-30 DIAGNOSIS — F33.42 RECURRENT MAJOR DEPRESSIVE DISORDER, IN FULL REMISSION (HCC): ICD-10-CM

## 2020-06-30 DIAGNOSIS — F43.21 GRIEF REACTION: ICD-10-CM

## 2020-06-30 DIAGNOSIS — E78.49 OTHER HYPERLIPIDEMIA: ICD-10-CM

## 2020-06-30 DIAGNOSIS — N39.46 MIXED STRESS AND URGE URINARY INCONTINENCE: ICD-10-CM

## 2020-06-30 RX ORDER — CITALOPRAM 20 MG/1
TABLET ORAL
Qty: 90 TABLET | Refills: 1 | OUTPATIENT
Start: 2020-06-30

## 2020-06-30 RX ORDER — ROSUVASTATIN CALCIUM 5 MG/1
TABLET, COATED ORAL
Qty: 90 TABLET | Refills: 1 | OUTPATIENT
Start: 2020-06-30

## 2020-06-30 RX ORDER — OXYBUTYNIN CHLORIDE 10 MG/1
TABLET, EXTENDED RELEASE ORAL
Qty: 90 TABLET | Refills: 1 | OUTPATIENT
Start: 2020-06-30

## 2020-07-02 ENCOUNTER — TELEPHONE (OUTPATIENT)
Dept: FAMILY MEDICINE CLINIC | Facility: CLINIC | Age: 80
End: 2020-07-02

## 2020-07-02 DIAGNOSIS — F33.42 RECURRENT MAJOR DEPRESSIVE DISORDER, IN FULL REMISSION (HCC): ICD-10-CM

## 2020-07-02 DIAGNOSIS — F43.21 GRIEF REACTION: ICD-10-CM

## 2020-07-02 RX ORDER — CITALOPRAM 20 MG/1
20 TABLET ORAL DAILY
Qty: 14 TABLET | Refills: 0 | Status: SHIPPED | OUTPATIENT
Start: 2020-07-02 | End: 2020-07-22 | Stop reason: SDUPTHER

## 2020-07-22 ENCOUNTER — OFFICE VISIT (OUTPATIENT)
Dept: FAMILY MEDICINE CLINIC | Facility: CLINIC | Age: 80
End: 2020-07-22

## 2020-07-22 VITALS
RESPIRATION RATE: 16 BRPM | WEIGHT: 119 LBS | TEMPERATURE: 97.7 F | HEIGHT: 62 IN | HEART RATE: 60 BPM | DIASTOLIC BLOOD PRESSURE: 64 MMHG | OXYGEN SATURATION: 95 % | BODY MASS INDEX: 21.9 KG/M2 | SYSTOLIC BLOOD PRESSURE: 99 MMHG

## 2020-07-22 DIAGNOSIS — F43.21 GRIEF REACTION: ICD-10-CM

## 2020-07-22 DIAGNOSIS — G45.9 TIA (TRANSIENT ISCHEMIC ATTACK): ICD-10-CM

## 2020-07-22 DIAGNOSIS — Z09 HOSPITAL DISCHARGE FOLLOW-UP: Primary | ICD-10-CM

## 2020-07-22 DIAGNOSIS — J18.9 PNEUMONIA OF RIGHT LOWER LOBE DUE TO INFECTIOUS ORGANISM: ICD-10-CM

## 2020-07-22 DIAGNOSIS — F33.42 RECURRENT MAJOR DEPRESSIVE DISORDER, IN FULL REMISSION (HCC): ICD-10-CM

## 2020-07-22 DIAGNOSIS — I10 ESSENTIAL HYPERTENSION: ICD-10-CM

## 2020-07-22 DIAGNOSIS — N39.46 MIXED STRESS AND URGE URINARY INCONTINENCE: ICD-10-CM

## 2020-07-22 PROBLEM — K90.9 INTESTINAL MALABSORPTION: Status: RESOLVED | Noted: 2017-06-28 | Resolved: 2020-07-22

## 2020-07-22 PROBLEM — I99.9 VASCULAR ABNORMALITY: Status: RESOLVED | Noted: 2017-07-11 | Resolved: 2020-07-22

## 2020-07-22 PROBLEM — A41.9 SEPTIC SHOCK: Status: RESOLVED | Noted: 2020-06-24 | Resolved: 2020-07-22

## 2020-07-22 PROBLEM — R65.21 SEPTIC SHOCK: Status: ACTIVE | Noted: 2020-06-24

## 2020-07-22 PROBLEM — R65.21 SEPTIC SHOCK: Status: RESOLVED | Noted: 2020-06-24 | Resolved: 2020-07-22

## 2020-07-22 PROBLEM — M54.50 ACUTE BILATERAL LOW BACK PAIN WITHOUT SCIATICA: Status: RESOLVED | Noted: 2020-06-26 | Resolved: 2020-07-22

## 2020-07-22 PROBLEM — A41.9 SEPTIC SHOCK (HCC): Status: ACTIVE | Noted: 2020-06-24

## 2020-07-22 PROBLEM — K63.5 COLON POLYP: Status: RESOLVED | Noted: 2017-07-26 | Resolved: 2020-07-22

## 2020-07-22 PROBLEM — G93.41 ACUTE METABOLIC ENCEPHALOPATHY: Status: ACTIVE | Noted: 2020-06-24

## 2020-07-22 PROBLEM — G93.41 ACUTE METABOLIC ENCEPHALOPATHY: Status: RESOLVED | Noted: 2020-06-24 | Resolved: 2020-07-22

## 2020-07-22 PROBLEM — E03.9 HYPOTHYROIDISM: Status: ACTIVE | Noted: 2017-03-31

## 2020-07-22 PROBLEM — M54.50 ACUTE BILATERAL LOW BACK PAIN WITHOUT SCIATICA: Status: ACTIVE | Noted: 2020-06-26

## 2020-07-22 PROCEDURE — 99214 OFFICE O/P EST MOD 30 MIN: CPT | Performed by: FAMILY MEDICINE

## 2020-07-22 RX ORDER — CITALOPRAM 20 MG/1
20 TABLET ORAL DAILY
Qty: 90 TABLET | Refills: 1 | Status: SHIPPED | OUTPATIENT
Start: 2020-07-22 | End: 2021-02-08

## 2020-07-22 RX ORDER — OLMESARTAN MEDOXOMIL 40 MG/1
40 TABLET ORAL DAILY
Qty: 90 TABLET | Refills: 1 | Status: SHIPPED | OUTPATIENT
Start: 2020-07-22 | End: 2021-02-08

## 2020-07-22 RX ORDER — OXYBUTYNIN CHLORIDE 10 MG/1
10 TABLET, EXTENDED RELEASE ORAL DAILY
Qty: 90 TABLET | Refills: 1 | Status: SHIPPED | OUTPATIENT
Start: 2020-07-22 | End: 2020-03-21

## 2020-07-22 RX ORDER — LIDOCAINE 50 MG/G
1 PATCH TOPICAL DAILY
COMMUNITY
Start: 2020-06-30 | End: 2020-07-22

## 2020-07-22 RX ORDER — IRBESARTAN 150 MG/1
150 TABLET ORAL DAILY
COMMUNITY
Start: 2018-08-08 | End: 2020-07-22 | Stop reason: ALTCHOICE

## 2020-07-22 RX ORDER — CLOPIDOGREL BISULFATE 75 MG/1
75 TABLET ORAL DAILY
Qty: 90 TABLET | Refills: 1 | Status: SHIPPED | OUTPATIENT
Start: 2020-07-22 | End: 2021-04-08 | Stop reason: SDUPTHER

## 2020-07-22 RX ORDER — LEVOTHYROXINE SODIUM 88 UG/1
88 TABLET ORAL DAILY
COMMUNITY

## 2020-07-22 RX ORDER — LEVOFLOXACIN 750 MG/1
TABLET ORAL
COMMUNITY
Start: 2020-06-29 | End: 2020-07-22

## 2020-07-22 RX ORDER — POLYETHYLENE GLYCOL 3350 17 G/17G
17 POWDER, FOR SOLUTION ORAL DAILY
COMMUNITY
End: 2020-12-21

## 2020-07-22 RX ORDER — LEVOTHYROXINE SODIUM 88 UG/1
TABLET ORAL
COMMUNITY
Start: 2020-05-15 | End: 2020-07-22

## 2020-07-22 NOTE — PROGRESS NOTES
"   Subjective       Chief Complaint   Patient presents with   • hospital follow up         HPI:       HPI   Patient presents the office follow-up from recent hospital stay from  through 2020.  She was admitted to [facility] due to pyelonephritis with sepsis.  Subsequently she was found to have right lower lobe pneumonia and was treated with antibiotics successfully.  She has overall been doing better and has finished her antibiotics outpatient.  She will need follow-up on pneumonia within the next month.  Urinary tract symptoms are resolved and she is now back down to her usual mixed stress and urge incontinence which is generally well controlled with her current medical therapy.  Several minutes were taken reviewing the discharge summary note.  She does not have specific follow-up for the pneumonia but she is using the spirometry at home as instructed from hospital.  Current outpatient and discharge medications have been reconciled for the patient.  Reviewed by: Anoop Appiah MD      Review of Systems   Constitutional: Negative for fatigue and fever.   Cardiovascular: Negative for chest pain.   Genitourinary: Negative for dysuria.        I have reviewed and confirmed the accuracy of the ROS as documented by myself or MA/LPN/RN Anoop Appiah MD   The patient's ACP documentation has  and needs to be reviewed again.      PE:   Objective   BP 99/64   Pulse 60   Temp 97.7 °F (36.5 °C) (Tympanic)   Resp 16   Ht 157.5 cm (62.01\")   Wt 54 kg (119 lb)   SpO2 95%   BMI 21.76 kg/m²     Body mass index is 21.76 kg/m².    Physical Exam   Constitutional: She is oriented to person, place, and time. She appears well-developed. No distress.   Eyes: Conjunctivae and lids are normal.   Neck: Carotid bruit is not present.   Cardiovascular: Normal rate, regular rhythm and normal heart sounds.   Pulmonary/Chest: Effort normal and breath sounds normal.   Neurological: She is alert and oriented to " person, place, and time.   Skin: Skin is warm and dry.   Psychiatric: She has a normal mood and affect. Her behavior is normal.   Vitals reviewed.         Data Reviewed:                  A/P:     Assessment/Plan   Diagnoses and all orders for this visit:    1. Hospital discharge follow-up (Primary)    2. Grief reaction  Comments:  Steven()  2016  Assessment & Plan:  The current medical regimen is effective;  continue present plan and medications.      Orders:  -     citalopram (CeleXA) 20 MG tablet; Take 1 tablet by mouth Daily for 180 days.  Dispense: 90 tablet; Refill: 1    3. Recurrent major depressive disorder, in full remission (CMS/HCC)  Assessment & Plan:  Psychological condition is unchanged.  Continue current treatment regimen.  Psychological condition  will be reassessed at the next regular appointment.    Orders:  -     citalopram (CeleXA) 20 MG tablet; Take 1 tablet by mouth Daily for 180 days.  Dispense: 90 tablet; Refill: 1    4. History of TIA (transient ischemic attack)  Assessment & Plan:  The current medical regimen is effective;  continue present plan and medications.      Orders:  -     clopidogrel (PLAVIX) 75 MG tablet; Take 1 tablet by mouth Daily for 180 days. Take 1 po every 3rd day  Dispense: 90 tablet; Refill: 1  -     Lipid Panel With / Chol / HDL Ratio; Future    5. Essential hypertension  Assessment & Plan:  Hypertension is unchanged.  Continue current treatment regimen.  Blood pressure will be reassessed at the next regular appointment.    Orders:  -     olmesartan (BENICAR) 40 MG tablet; Take 1 tablet by mouth Daily for 180 days.  Dispense: 90 tablet; Refill: 1  -     Comprehensive Metabolic Panel; Future  -     CBC & Differential; Future    6. Mixed stress and urge urinary incontinence  Assessment & Plan:  The current medical regimen is effective;  continue present plan and medications.      Orders:  -     oxybutynin XL (DITROPAN-XL) 10 MG 24 hr tablet; Take 1 tablet by  mouth Daily for 180 days.  Dispense: 90 tablet; Refill: 1    7. Pneumonia of right lower lobe due to infectious organism  Assessment & Plan:  cxr in one month    Orders:  -     XR Chest PA & Lateral (In Office); Future        Follow up:    Return in about 6 months (around 1/22/2021) for Medicare Wellness Visit.

## 2020-09-08 DIAGNOSIS — E78.49 OTHER HYPERLIPIDEMIA: ICD-10-CM

## 2020-09-08 DIAGNOSIS — G45.9 TIA (TRANSIENT ISCHEMIC ATTACK): ICD-10-CM

## 2020-09-08 RX ORDER — CLOPIDOGREL BISULFATE 75 MG/1
TABLET ORAL
Qty: 30 TABLET | Refills: 5 | OUTPATIENT
Start: 2020-09-08

## 2020-09-09 RX ORDER — ROSUVASTATIN CALCIUM 5 MG/1
TABLET, COATED ORAL
Qty: 90 TABLET | Refills: 1 | Status: SHIPPED | OUTPATIENT
Start: 2020-09-09 | End: 2020-03-21

## 2020-10-07 ENCOUNTER — TELEPHONE (OUTPATIENT)
Dept: FAMILY MEDICINE CLINIC | Facility: CLINIC | Age: 80
End: 2020-10-07

## 2020-10-07 DIAGNOSIS — Z12.31 ENCOUNTER FOR SCREENING MAMMOGRAM FOR BREAST CANCER: Primary | ICD-10-CM

## 2020-11-25 ENCOUNTER — APPOINTMENT (OUTPATIENT)
Dept: WOMENS IMAGING | Facility: HOSPITAL | Age: 80
End: 2020-11-25

## 2020-11-25 PROCEDURE — 77063 BREAST TOMOSYNTHESIS BI: CPT | Performed by: RADIOLOGY

## 2020-11-25 PROCEDURE — 77067 SCR MAMMO BI INCL CAD: CPT | Performed by: RADIOLOGY

## 2020-12-19 ENCOUNTER — RESULTS ENCOUNTER (OUTPATIENT)
Dept: FAMILY MEDICINE CLINIC | Facility: CLINIC | Age: 80
End: 2020-12-19

## 2020-12-19 DIAGNOSIS — G45.9 TIA (TRANSIENT ISCHEMIC ATTACK): ICD-10-CM

## 2020-12-19 DIAGNOSIS — I10 ESSENTIAL HYPERTENSION: ICD-10-CM

## 2020-12-21 ENCOUNTER — OFFICE VISIT (OUTPATIENT)
Dept: FAMILY MEDICINE CLINIC | Facility: CLINIC | Age: 80
End: 2020-12-21

## 2020-12-21 VITALS
TEMPERATURE: 97.7 F | HEART RATE: 72 BPM | SYSTOLIC BLOOD PRESSURE: 141 MMHG | DIASTOLIC BLOOD PRESSURE: 86 MMHG | HEIGHT: 62 IN | RESPIRATION RATE: 16 BRPM | BODY MASS INDEX: 24.29 KG/M2 | OXYGEN SATURATION: 96 % | WEIGHT: 132 LBS

## 2020-12-21 DIAGNOSIS — F33.42 RECURRENT MAJOR DEPRESSIVE DISORDER, IN FULL REMISSION (HCC): ICD-10-CM

## 2020-12-21 DIAGNOSIS — E78.49 OTHER HYPERLIPIDEMIA: ICD-10-CM

## 2020-12-21 DIAGNOSIS — E03.9 ACQUIRED HYPOTHYROIDISM: ICD-10-CM

## 2020-12-21 DIAGNOSIS — G47.8 POOR SLEEP PATTERN: ICD-10-CM

## 2020-12-21 DIAGNOSIS — D64.9 ANEMIA, UNSPECIFIED TYPE: ICD-10-CM

## 2020-12-21 DIAGNOSIS — C73 FOLLICULAR THYROID CARCINOMA (HCC): ICD-10-CM

## 2020-12-21 DIAGNOSIS — I10 ESSENTIAL HYPERTENSION: Primary | ICD-10-CM

## 2020-12-21 DIAGNOSIS — N39.46 MIXED STRESS AND URGE URINARY INCONTINENCE: ICD-10-CM

## 2020-12-21 DIAGNOSIS — G45.9 TIA (TRANSIENT ISCHEMIC ATTACK): ICD-10-CM

## 2020-12-21 DIAGNOSIS — J18.9 PNEUMONIA OF RIGHT LOWER LOBE DUE TO INFECTIOUS ORGANISM: ICD-10-CM

## 2020-12-21 DIAGNOSIS — K59.04 CHRONIC IDIOPATHIC CONSTIPATION: ICD-10-CM

## 2020-12-21 DIAGNOSIS — R93.89 ABNORMAL CXR: ICD-10-CM

## 2020-12-21 DIAGNOSIS — R79.89 LOW SERUM CALCIUM: ICD-10-CM

## 2020-12-21 PROBLEM — H90.8 MIXED CONDUCTIVE AND SENSORINEURAL HEARING LOSS: Status: ACTIVE | Noted: 2018-09-09

## 2020-12-21 PROCEDURE — 71046 X-RAY EXAM CHEST 2 VIEWS: CPT | Performed by: FAMILY MEDICINE

## 2020-12-21 PROCEDURE — 99214 OFFICE O/P EST MOD 30 MIN: CPT | Performed by: FAMILY MEDICINE

## 2020-12-21 NOTE — ASSESSMENT & PLAN NOTE
Hypertension is unchanged.  Continue current treatment regimen. decrease sodium  Blood pressure will be reassessed at the next regular appointment.

## 2020-12-21 NOTE — ASSESSMENT & PLAN NOTE
Psychological condition is worsening.due to covid 19  Continue current treatment regimen.  Psychological condition  will be reassessed at the next regular appointment.

## 2020-12-21 NOTE — PROGRESS NOTES
"   Subjective       Chief Complaint   Patient presents with   • Constipation   • Memory Loss   • Leg Swelling         SUBJECTIVE:       This patient presents for work-up of recent problems.  Patient does need follow-up chest x-ray for history of right lower lobe pneumonia in 2020.  Currently she is not having pneumonialike symptoms.  Biggest issues include recent episode where she lost her way while driving.  This occurs Thursday.  She has also had trouble with sleep and has been having increased anxiety.  Some depression symptoms with COVID-19 pandemic situation and being isolated and at home.  Today she is here with her daughter who is helpful with some of the historical issues.  Some issues with the patient include some memory concerns.  She continues to have some problems with urinary incontinence.  Medication list has been updated during today's visit.  Another ongoing problem is chronic constipation.  Currently she is not being treated for that condition.  She has follow-up with endocrinology in the near future scheduled due to hypothyroidism and past history of follicular thyroid carcinoma.  Labs are due.  History of Present Illness       Review of Systems   Constitutional: Positive for unexpected weight change (weight gain).   Genitourinary:        Incontinence   Neurological:        Memory issues   Psychiatric/Behavioral: Positive for sleep disturbance. The patient is nervous/anxious.       I have reviewed the ROS as documented above. Anoop Appiah MD     The patient's ACP documentation has  and needs to be reviewed again.        OBJECTIVE   Objective   /86   Pulse 72   Temp 97.7 °F (36.5 °C) (Tympanic)   Resp 16   Ht 157.5 cm (62.01\")   Wt 59.9 kg (132 lb)   SpO2 96%   BMI 24.14 kg/m²  Body mass index is 24.14 kg/m².    Physical Exam  Vitals signs reviewed.   Constitutional:       General: She is not in acute distress.  Eyes:      General: Lids are normal.      Conjunctiva/sclera: " Conjunctivae normal.   Neck:      Vascular: No carotid bruit.      Trachea: No tracheal deviation.   Cardiovascular:      Rate and Rhythm: Normal rate and regular rhythm.      Heart sounds: Murmur present.   Pulmonary:      Effort: Pulmonary effort is normal.      Breath sounds: Normal breath sounds.   Musculoskeletal:      Right lower le+ Pitting Edema present.      Left lower le+ Pitting Edema (multiple calcified granulomas) present.   Skin:     General: Skin is warm and dry.   Neurological:      Mental Status: She is alert. She is not disoriented.   Psychiatric:         Speech: Speech normal.         Behavior: Behavior normal. Behavior is cooperative.             The data below has been reviewed by Anoop Appiah M.D. on date of this encounter.   RADIOLOGY REPORT    FACILITY:  [facility]  UNIT/AGE/GENDER: LESLEY  IN      AGE:80 Y          SEX:F  PATIENT NAME/:  JOSE TANG M    1940  UNIT NUMBER:  [id number]  ACCOUNT NUMBER:  [id number]  ACCESSION NUMBER:  [id number]    PORTABLE AP CHEST, SINGLE VIEW    DATE: 2020        COMPARISON: 2020        INDICATION: questionable pneumonia.         IMPRESSION:   1. Increased bibasal opacities may be increased small pleural effusions, atelectasis, or pneumonia  2. Chronic interstitial opacities in the upper lobes, unchanged from 2017  3. Heart size normal. No pneumothorax    Dictated by: Russ Martinez M.D.    Images and Report reviewed and interpreted by: Russ Martinez M.D.    <PS><Electronically signed by: Russ Martinez M.D.>  2020 1309    D: 2020 1308  T: 2020 1308         Procedures    Encounter Date that the following test/s were ordered and reviewed by Anoop Appiah MD: 2020     Relevant Clinical Issues/Diagnoses/Indications for XRay: see HPI    CXR    Views: lateral and posterior-anteriorCXR    Clinical Findings: Chest: multiple calcified granulomas    Compared with previous XRay?  no           ASSESSMENT/PLAN:     Assessment/Plan   Diagnoses and all orders for this visit:    1. Essential hypertension (Primary)  Assessment & Plan:  Hypertension is unchanged.  Continue current treatment regimen. decrease sodium  Blood pressure will be reassessed at the next regular appointment.    Orders:  -     Comprehensive Metabolic Panel  -     CBC & Differential    2. Chronic idiopathic constipation  Assessment & Plan:  linzess 72 mcg daily.     Orders:  -     linaclotide (Linzess) 72 MCG capsule capsule; Take 1 capsule by mouth Every Morning Before Breakfast for 180 days.  Dispense: 30 capsule; Refill: 5    3. Mixed stress and urge urinary incontinence  -     Urinalysis With Culture If Indicated -    4. Pneumonia of right lower lobe due to infectious organism  -     XR Chest PA & Lateral (In Office)  -     Ambulatory Referral to Pulmonology    5. Recurrent major depressive disorder, in full remission (CMS/HCC)  Assessment & Plan:  Psychological condition is worsening.due to covid 19  Continue current treatment regimen.  Psychological condition  will be reassessed at the next regular appointment.    Orders:  -     Comprehensive Metabolic Panel  -     CBC & Differential    6. History of TIA (transient ischemic attack)  Assessment & Plan:  The current medical regimen is effective;  continue present plan and medications.        7. Acquired hypothyroidism  Assessment & Plan:  The current medical regimen is effective;  continue present plan and medications.      Orders:  -     TSH  -     T4, Free    8. Follicular thyroid carcinoma (CMS/HCC)  Assessment & Plan:  Check labs, see endocrinologist    Orders:  -     TSH  -     T4, Free    9. Anemia, unspecified type  Assessment & Plan:  Check labs    Orders:  -     CBC & Differential  -     Iron Profile  -     Vitamin B12 & Folate    10. Low serum calcium  Comments:  check labs  Orders:  -     Comprehensive Metabolic Panel  -     Calcium, Ionized    11. Other  hyperlipidemia  -     CK  -     Lipid Panel With / Chol / HDL Ratio    12. Abnormal CXR  -     Ambulatory Referral to Pulmonology    13. Poor sleep pattern  -     Melatonin 3 MG capsule; Take 1 capsule by mouth Every Night for 180 days.  Dispense: 30 capsule; Refill: 5        FOLLOW UP:    Return in about 3 weeks (around 1/11/2021) for Video Visit.

## 2020-12-23 LAB
ALBUMIN SERPL-MCNC: 4 G/DL (ref 3.5–5.2)
ALBUMIN/GLOB SERPL: 1.3 G/DL
ALP SERPL-CCNC: 84 U/L (ref 39–117)
ALT SERPL-CCNC: 20 U/L (ref 1–33)
AST SERPL-CCNC: 26 U/L (ref 1–32)
BASOPHILS # BLD AUTO: 0.03 10*3/MM3 (ref 0–0.2)
BASOPHILS NFR BLD AUTO: 0.5 % (ref 0–1.5)
BILIRUB SERPL-MCNC: 0.3 MG/DL (ref 0–1.2)
BUN SERPL-MCNC: 22 MG/DL (ref 8–23)
BUN/CREAT SERPL: 22.4 (ref 7–25)
CA-I SERPL ISE-MCNC: 5 MG/DL (ref 4.5–5.6)
CALCIUM SERPL-MCNC: 8.7 MG/DL (ref 8.6–10.5)
CHLORIDE SERPL-SCNC: 105 MMOL/L (ref 98–107)
CHOLEST SERPL-MCNC: 177 MG/DL (ref 0–200)
CHOLEST/HDLC SERPL: 2.9 {RATIO}
CK SERPL-CCNC: 146 U/L (ref 20–180)
CO2 SERPL-SCNC: 26.3 MMOL/L (ref 22–29)
CREAT SERPL-MCNC: 0.98 MG/DL (ref 0.57–1)
EOSINOPHIL # BLD AUTO: 0.15 10*3/MM3 (ref 0–0.4)
EOSINOPHIL NFR BLD AUTO: 2.5 % (ref 0.3–6.2)
ERYTHROCYTE [DISTWIDTH] IN BLOOD BY AUTOMATED COUNT: 13.3 % (ref 12.3–15.4)
FOLATE SERPL-MCNC: >20 NG/ML (ref 4.78–24.2)
GLOBULIN SER CALC-MCNC: 3.1 GM/DL
GLUCOSE SERPL-MCNC: 101 MG/DL (ref 65–99)
HCT VFR BLD AUTO: 31.7 % (ref 34–46.6)
HDLC SERPL-MCNC: 61 MG/DL (ref 40–60)
HGB BLD-MCNC: 10.3 G/DL (ref 12–15.9)
IMM GRANULOCYTES # BLD AUTO: 0.13 10*3/MM3 (ref 0–0.05)
IMM GRANULOCYTES NFR BLD AUTO: 2.1 % (ref 0–0.5)
IRON SATN MFR SERPL: 15 % (ref 20–50)
IRON SERPL-MCNC: 64 MCG/DL (ref 37–145)
LDLC SERPL CALC-MCNC: 97 MG/DL (ref 0–100)
LYMPHOCYTES # BLD AUTO: 1.63 10*3/MM3 (ref 0.7–3.1)
LYMPHOCYTES NFR BLD AUTO: 26.8 % (ref 19.6–45.3)
MCH RBC QN AUTO: 27.8 PG (ref 26.6–33)
MCHC RBC AUTO-ENTMCNC: 32.5 G/DL (ref 31.5–35.7)
MCV RBC AUTO: 85.7 FL (ref 79–97)
MONOCYTES # BLD AUTO: 1.07 10*3/MM3 (ref 0.1–0.9)
MONOCYTES NFR BLD AUTO: 17.6 % (ref 5–12)
NEUTROPHILS # BLD AUTO: 3.08 10*3/MM3 (ref 1.7–7)
NEUTROPHILS NFR BLD AUTO: 50.5 % (ref 42.7–76)
NRBC BLD AUTO-RTO: 0 /100 WBC (ref 0–0.2)
PLATELET # BLD AUTO: 151 10*3/MM3 (ref 140–450)
POTASSIUM SERPL-SCNC: 4.6 MMOL/L (ref 3.5–5.2)
PROT SERPL-MCNC: 7.1 G/DL (ref 6–8.5)
RBC # BLD AUTO: 3.7 10*6/MM3 (ref 3.77–5.28)
SODIUM SERPL-SCNC: 140 MMOL/L (ref 136–145)
T4 FREE SERPL-MCNC: 1.27 NG/DL (ref 0.93–1.7)
TIBC SERPL-MCNC: 424 MCG/DL
TRIGL SERPL-MCNC: 106 MG/DL (ref 0–150)
TSH SERPL DL<=0.005 MIU/L-ACNC: 0.2 UIU/ML (ref 0.27–4.2)
UIBC SERPL-MCNC: 360 MCG/DL (ref 112–346)
VIT B12 SERPL-MCNC: 855 PG/ML (ref 211–946)
VLDLC SERPL CALC-MCNC: 19 MG/DL (ref 5–40)
WBC # BLD AUTO: 6.09 10*3/MM3 (ref 3.4–10.8)

## 2020-12-29 ENCOUNTER — TELEPHONE (OUTPATIENT)
Dept: FAMILY MEDICINE CLINIC | Facility: CLINIC | Age: 80
End: 2020-12-29

## 2021-01-05 ENCOUNTER — TELEPHONE (OUTPATIENT)
Dept: FAMILY MEDICINE CLINIC | Facility: CLINIC | Age: 81
End: 2021-01-05

## 2021-01-05 NOTE — TELEPHONE ENCOUNTER
Caller: Tejal Garg    Relationship to patient: Emergency Contact    Best call back number: [phone]    Concerns or Questions if Applicable: MS TANG'S DAUGHTER AXEL CALLED THIS MORNING STATING THAT SHE WOULD LIKE ANY PHONE CALL PERTAINING TO HER MOTHER BE DIRECTED TO HER NUMBER LISTED ABOVE.

## 2021-01-07 ENCOUNTER — TELEPHONE (OUTPATIENT)
Dept: FAMILY MEDICINE CLINIC | Facility: CLINIC | Age: 81
End: 2021-01-07

## 2021-01-07 NOTE — TELEPHONE ENCOUNTER
REACHED OUT TO PT AND DAUGHTER AGAIN. NO ANSWER WENT TO VM LEFT VM TO CALL BACK ---- OK TO GIVE RESULTS.

## 2021-01-07 NOTE — TELEPHONE ENCOUNTER
----- Message from Anoop Appiah MD sent at 12/24/2020  8:06 AM EST -----  Please call the patient regarding her abnormal result.  Please send her a copy of these lab results.  Tell her the calcium level is within normal limits.  Tell her that her thyroid is overtreated with current medicine.  Tell her to change her levothyroxine to 88 mcg daily and skip the extra half dose on Sunday.  Tell her her iron deficiency anemia is slightly improved.  Have her share these labs with her hematologist.  If she does not have an appointment with her hematologist then have her set 1 up. His name is Misha Riley.  Have her keep her next appointment with me on January 25 as scheduled.

## 2021-02-07 DIAGNOSIS — F43.21 GRIEF REACTION: ICD-10-CM

## 2021-02-07 DIAGNOSIS — F33.42 RECURRENT MAJOR DEPRESSIVE DISORDER, IN FULL REMISSION (HCC): ICD-10-CM

## 2021-02-07 DIAGNOSIS — I10 ESSENTIAL HYPERTENSION: ICD-10-CM

## 2021-02-08 RX ORDER — OLMESARTAN MEDOXOMIL 40 MG/1
TABLET ORAL
Qty: 30 TABLET | Refills: 0 | Status: SHIPPED | OUTPATIENT
Start: 2021-02-08 | End: 2021-03-19

## 2021-02-08 RX ORDER — CITALOPRAM 20 MG/1
TABLET ORAL
Qty: 30 TABLET | Refills: 0 | Status: SHIPPED | OUTPATIENT
Start: 2021-02-08 | End: 2021-03-22

## 2021-03-02 DIAGNOSIS — Z23 IMMUNIZATION DUE: ICD-10-CM

## 2021-03-19 DIAGNOSIS — I10 ESSENTIAL HYPERTENSION: ICD-10-CM

## 2021-03-20 DIAGNOSIS — F43.21 GRIEF REACTION: ICD-10-CM

## 2021-03-20 DIAGNOSIS — F33.42 RECURRENT MAJOR DEPRESSIVE DISORDER, IN FULL REMISSION (HCC): ICD-10-CM

## 2021-03-22 RX ORDER — CITALOPRAM 20 MG/1
20 TABLET ORAL DAILY
Qty: 15 TABLET | Refills: 0 | Status: SHIPPED | OUTPATIENT
Start: 2021-03-22 | End: 2021-04-06 | Stop reason: SDUPTHER

## 2021-03-22 RX ORDER — OLMESARTAN MEDOXOMIL 40 MG/1
40 TABLET ORAL DAILY
Qty: 30 TABLET | Refills: 0 | Status: SHIPPED | OUTPATIENT
Start: 2021-03-22 | End: 2021-04-07

## 2021-04-06 ENCOUNTER — TELEPHONE (OUTPATIENT)
Dept: FAMILY MEDICINE CLINIC | Facility: CLINIC | Age: 81
End: 2021-04-06

## 2021-04-06 DIAGNOSIS — F33.42 RECURRENT MAJOR DEPRESSIVE DISORDER, IN FULL REMISSION (HCC): ICD-10-CM

## 2021-04-06 DIAGNOSIS — I10 ESSENTIAL HYPERTENSION: ICD-10-CM

## 2021-04-06 DIAGNOSIS — F43.21 GRIEF REACTION: ICD-10-CM

## 2021-04-06 NOTE — TELEPHONE ENCOUNTER
Caller: Jose Varela    Relationship: Self    Best call back number: [phone]    What is the best time to reach you: ANYTIME    Who are you requesting to speak with (clinical staff, provider,  specific staff member): NURSE    Do you know the name of the person who called: JOSE    What was the call regarding: PATIENT STATES THAT [facility] IS NEEDING DR. RAMIREZ TO APPROVE  OF 2 MEDICATIONS. PLEASE CALL PHARMACY AND THEN PATIENT WHEN DONE.    Do you require a callback: YES    [facility]/pharmacy #[id number] - [place], [place] - [address] - [phone]  - [phone]   [phone]

## 2021-04-07 RX ORDER — OLMESARTAN MEDOXOMIL 40 MG/1
40 TABLET ORAL DAILY
Qty: 15 TABLET | Refills: 0 | Status: SHIPPED | OUTPATIENT
Start: 2021-04-07 | End: 2021-04-08 | Stop reason: SDUPTHER

## 2021-04-07 RX ORDER — CITALOPRAM 20 MG/1
20 TABLET ORAL DAILY
Qty: 15 TABLET | Refills: 0 | Status: SHIPPED | OUTPATIENT
Start: 2021-04-07 | End: 2021-04-07 | Stop reason: SDUPTHER

## 2021-04-07 RX ORDER — CITALOPRAM 20 MG/1
20 TABLET ORAL DAILY
Qty: 15 TABLET | Refills: 0 | Status: SHIPPED | OUTPATIENT
Start: 2021-04-07 | End: 2021-04-08 | Stop reason: SDUPTHER

## 2021-04-07 NOTE — TELEPHONE ENCOUNTER
I am not sure why but after you approve these prescriptions they keep getting routed to me as well for some reason.

## 2021-04-08 ENCOUNTER — OFFICE VISIT (OUTPATIENT)
Dept: FAMILY MEDICINE CLINIC | Facility: CLINIC | Age: 81
End: 2021-04-08

## 2021-04-08 ENCOUNTER — TELEPHONE (OUTPATIENT)
Dept: FAMILY MEDICINE CLINIC | Facility: CLINIC | Age: 81
End: 2021-04-08

## 2021-04-08 VITALS
OXYGEN SATURATION: 98 % | TEMPERATURE: 96.9 F | RESPIRATION RATE: 16 BRPM | SYSTOLIC BLOOD PRESSURE: 138 MMHG | HEART RATE: 65 BPM | DIASTOLIC BLOOD PRESSURE: 88 MMHG | BODY MASS INDEX: 25.03 KG/M2 | HEIGHT: 62 IN | WEIGHT: 136 LBS

## 2021-04-08 DIAGNOSIS — Z11.59 NEED FOR HEPATITIS C SCREENING TEST: ICD-10-CM

## 2021-04-08 DIAGNOSIS — Z13.6 SCREENING FOR ISCHEMIC HEART DISEASE: ICD-10-CM

## 2021-04-08 DIAGNOSIS — E03.9 ACQUIRED HYPOTHYROIDISM: ICD-10-CM

## 2021-04-08 DIAGNOSIS — G45.9 TIA (TRANSIENT ISCHEMIC ATTACK): ICD-10-CM

## 2021-04-08 DIAGNOSIS — F33.42 RECURRENT MAJOR DEPRESSIVE DISORDER, IN FULL REMISSION (HCC): ICD-10-CM

## 2021-04-08 DIAGNOSIS — C73 FOLLICULAR THYROID CARCINOMA (HCC): ICD-10-CM

## 2021-04-08 DIAGNOSIS — F43.21 GRIEF REACTION: ICD-10-CM

## 2021-04-08 DIAGNOSIS — G47.8 POOR SLEEP PATTERN: ICD-10-CM

## 2021-04-08 DIAGNOSIS — D50.0 IRON DEFICIENCY ANEMIA DUE TO CHRONIC BLOOD LOSS: ICD-10-CM

## 2021-04-08 DIAGNOSIS — I10 ESSENTIAL HYPERTENSION: Primary | ICD-10-CM

## 2021-04-08 DIAGNOSIS — K59.04 CHRONIC IDIOPATHIC CONSTIPATION: ICD-10-CM

## 2021-04-08 PROBLEM — J18.9 RIGHT LOWER LOBE PNEUMONIA: Status: RESOLVED | Noted: 2020-06-26 | Resolved: 2021-04-08

## 2021-04-08 PROBLEM — D64.9 ANEMIA: Status: RESOLVED | Noted: 2020-12-21 | Resolved: 2021-04-08

## 2021-04-08 PROCEDURE — 99214 OFFICE O/P EST MOD 30 MIN: CPT | Performed by: FAMILY MEDICINE

## 2021-04-08 RX ORDER — CLOPIDOGREL BISULFATE 75 MG/1
75 TABLET ORAL DAILY
Qty: 90 TABLET | Refills: 1 | Status: SHIPPED | OUTPATIENT
Start: 2021-04-08 | End: 2021-04-14 | Stop reason: SDUPTHER

## 2021-04-08 RX ORDER — MIRTAZAPINE 7.5 MG/1
7.5 TABLET, FILM COATED ORAL
COMMUNITY
Start: 2021-03-01 | End: 2023-02-01

## 2021-04-08 RX ORDER — LINACLOTIDE 145 UG/1
145 CAPSULE, GELATIN COATED ORAL
Qty: 30 CAPSULE | Refills: 5 | Status: SHIPPED | OUTPATIENT
Start: 2021-04-08 | End: 2021-12-01

## 2021-04-08 RX ORDER — CITALOPRAM 20 MG/1
20 TABLET ORAL DAILY
Qty: 90 TABLET | Refills: 1 | Status: SHIPPED | OUTPATIENT
Start: 2021-04-08 | End: 2021-12-01 | Stop reason: SDUPTHER

## 2021-04-08 RX ORDER — OLMESARTAN MEDOXOMIL 40 MG/1
40 TABLET ORAL DAILY
Qty: 90 TABLET | Refills: 1 | Status: SHIPPED | OUTPATIENT
Start: 2021-04-08 | End: 2021-12-01 | Stop reason: SDUPTHER

## 2021-04-08 RX ORDER — VIT C/B6/B5/MAGNESIUM/HERB 173 50-5-6-5MG
1 CAPSULE ORAL DAILY
COMMUNITY
End: 2023-02-01

## 2021-04-08 RX ORDER — NICOTINE POLACRILEX 2 MG
1 GUM BUCCAL DAILY
COMMUNITY
End: 2021-12-01

## 2021-04-08 RX ORDER — DONEPEZIL HYDROCHLORIDE 5 MG/1
5 TABLET, FILM COATED ORAL DAILY
COMMUNITY
Start: 2021-03-16 | End: 2021-04-08 | Stop reason: SDUPTHER

## 2021-04-08 RX ORDER — DONEPEZIL HYDROCHLORIDE 5 MG/1
5 TABLET, FILM COATED ORAL DAILY
COMMUNITY
Start: 2021-03-16 | End: 2022-06-30 | Stop reason: SDUPTHER

## 2021-04-08 RX ORDER — CETIRIZINE HYDROCHLORIDE 5 MG/1
10 TABLET ORAL
COMMUNITY

## 2021-04-08 NOTE — TELEPHONE ENCOUNTER
She will probably need to come in to have either Amarilis or Sarah see her tomorrow or me or early next week to evaluate this condition.

## 2021-04-08 NOTE — ASSESSMENT & PLAN NOTE
Constipation not completely controlled with low-dose Linzess.  We will increase dose to 145 mcg capsule daily before breakfast.  Patient encouraged to drink 6 to 8 glasses of water a day and make sure she has high fiber in her diet.

## 2021-04-08 NOTE — TELEPHONE ENCOUNTER
Pt states that she is having a rectum discharge, no blood or stool mixed with discharge.  Discharge is clear and has lasted for about 2 weeks.  Pt states she forgot to mention.  Please advise.

## 2021-04-08 NOTE — ASSESSMENT & PLAN NOTE
Patient's depression is recurrent and is mild without psychosis. Their depression is currently in full remission and the condition is unchanged. This will be reassessed at the next regular appointment. F/U as described:patient will continue current medication therapy.

## 2021-04-08 NOTE — TELEPHONE ENCOUNTER
PATIENT CALLED STATING THAT SHE HAS AN ISSUE THAT SHE FORGOT TO MENTION DURING THE APPOINTMENT ON 04/08/21. SHE IS REQUESTING A CALL BACK FROM CLINICAL REGARDING SOME DISCHARGE THAT HAS BEEN HAPPENING FOR THE PAST MONTH.    PLEASE ADVISE   [phone]

## 2021-04-12 ENCOUNTER — OFFICE VISIT (OUTPATIENT)
Dept: FAMILY MEDICINE CLINIC | Facility: CLINIC | Age: 81
End: 2021-04-12

## 2021-04-12 VITALS
TEMPERATURE: 97.7 F | WEIGHT: 137 LBS | BODY MASS INDEX: 25.21 KG/M2 | HEIGHT: 62 IN | DIASTOLIC BLOOD PRESSURE: 68 MMHG | SYSTOLIC BLOOD PRESSURE: 120 MMHG | OXYGEN SATURATION: 94 % | RESPIRATION RATE: 16 BRPM | HEART RATE: 63 BPM

## 2021-04-12 DIAGNOSIS — K56.41 FECAL IMPACTION (HCC): Primary | ICD-10-CM

## 2021-04-12 PROCEDURE — 99213 OFFICE O/P EST LOW 20 MIN: CPT | Performed by: NURSE PRACTITIONER

## 2021-04-12 NOTE — PROGRESS NOTES
Subjective   Sandy Varela is a 80 y.o. female.     History of Present Illness   Sandy Varela 80 y.o. female who presents for evaluation of constipation and impaction. Symptoms have been present for several days .  The condition is aggravated by nothing . she is experiencing small ball size stools, trouble pushing out stools and mucus in stools.  Alleviating factors are nothing with no change in symptoms . Patient denies fever, chills, abdominal pain, dyschezia, melena, bright red blood in stool, fatty food intolerance, nausea, reflux and vomiting her past medical history is notable for constipation.  Patient denies recent travel.      She reports easy bruising. She is taking Plavix daily and has hx of anemia that was improved with last lab set.     The following portions of the patient's history were reviewed and updated as appropriate: allergies, current medications, past family history, past medical history, past social history, past surgical history and problem list.    Review of Systems   Constitutional: Negative for chills, fever and unexpected weight change.   Respiratory: Negative for shortness of breath.    Cardiovascular: Negative for chest pain and palpitations.   Gastrointestinal: Positive for constipation. Negative for abdominal distention, abdominal pain, anal bleeding, blood in stool, diarrhea, nausea, rectal pain and vomiting.   Psychiatric/Behavioral: Negative for behavioral problems.       Objective   Physical Exam  Vitals and nursing note reviewed.   Constitutional:       Appearance: Normal appearance. She is well-developed.   Pulmonary:      Effort: Pulmonary effort is normal.   Neurological:      Mental Status: She is alert and oriented to person, place, and time.   Psychiatric:         Mood and Affect: Mood normal.         Behavior: Behavior normal.         Thought Content: Thought content normal.         Judgment: Judgment normal.         Assessment/Plan   Diagnoses and all orders for this  visit:    1. Fecal impaction (CMS/HCC) (Primary)      Glycerin suppository or Fleet enema.  Continue Linzess and increase water intake.  F/u if no change in 2 days.

## 2021-04-14 ENCOUNTER — TELEPHONE (OUTPATIENT)
Dept: FAMILY MEDICINE CLINIC | Facility: CLINIC | Age: 81
End: 2021-04-14

## 2021-04-14 DIAGNOSIS — G45.9 TIA (TRANSIENT ISCHEMIC ATTACK): ICD-10-CM

## 2021-04-14 RX ORDER — CLOPIDOGREL BISULFATE 75 MG/1
TABLET ORAL
Qty: 30 TABLET | Refills: 1 | Status: SHIPPED | OUTPATIENT
Start: 2021-04-14 | End: 2021-12-01 | Stop reason: SDUPTHER

## 2021-04-14 NOTE — TELEPHONE ENCOUNTER
I contacted patient and she confirmed that she is only taking this every Monday and Thursday. Dr. Appiah

## 2021-04-14 NOTE — TELEPHONE ENCOUNTER
Pharmacy Name:  [facility]    Pharmacy representative name: NADER    Pharmacy representative phone number: [phone]    What medication are you calling in regards to: clopidogrel (PLAVIX) 75 MG tablet    What question does the pharmacy have: JUST NEED TO VERIFY THE DIRECTIONS. PRESCRIPTION STATES TAKE EVERY Monday AND Thursday BUT PRESCRIPTION IS WRITTEN FOR 30.    Who is the provider that prescribed the medication: DR. RAMIREZ    Additional notes:

## 2021-06-09 ENCOUNTER — TELEPHONE (OUTPATIENT)
Dept: FAMILY MEDICINE CLINIC | Facility: CLINIC | Age: 81
End: 2021-06-09

## 2021-10-06 ENCOUNTER — TELEPHONE (OUTPATIENT)
Dept: FAMILY MEDICINE CLINIC | Facility: CLINIC | Age: 81
End: 2021-10-06

## 2021-10-06 NOTE — TELEPHONE ENCOUNTER
Caller: Tejal Garg    Relationship to patient: Emergency Contact    Best call back number: [phone] (M)    Chief complaint:     Type of visit:AWV    Requested date: OCTOBER     If rescheduling, when is the original appointment: 10/07/21    Additional notes:PATIENT IS ILL

## 2021-12-01 ENCOUNTER — OFFICE VISIT (OUTPATIENT)
Dept: FAMILY MEDICINE CLINIC | Facility: CLINIC | Age: 81
End: 2021-12-01

## 2021-12-01 VITALS
SYSTOLIC BLOOD PRESSURE: 140 MMHG | WEIGHT: 129 LBS | DIASTOLIC BLOOD PRESSURE: 83 MMHG | HEART RATE: 69 BPM | BODY MASS INDEX: 23.74 KG/M2 | RESPIRATION RATE: 16 BRPM | OXYGEN SATURATION: 98 % | TEMPERATURE: 97.2 F | HEIGHT: 62 IN

## 2021-12-01 DIAGNOSIS — Z23 NEED FOR INFLUENZA VACCINATION: ICD-10-CM

## 2021-12-01 DIAGNOSIS — G45.9 TIA (TRANSIENT ISCHEMIC ATTACK): ICD-10-CM

## 2021-12-01 DIAGNOSIS — K59.04 CHRONIC IDIOPATHIC CONSTIPATION: ICD-10-CM

## 2021-12-01 DIAGNOSIS — I10 ESSENTIAL HYPERTENSION: Primary | ICD-10-CM

## 2021-12-01 DIAGNOSIS — F43.21 GRIEF REACTION: ICD-10-CM

## 2021-12-01 DIAGNOSIS — R82.998 DARK URINE: ICD-10-CM

## 2021-12-01 DIAGNOSIS — F33.42 RECURRENT MAJOR DEPRESSIVE DISORDER, IN FULL REMISSION (HCC): ICD-10-CM

## 2021-12-01 PROBLEM — M12.819 ROTATOR CUFF ARTHROPATHY: Status: ACTIVE | Noted: 2021-10-19

## 2021-12-01 PROCEDURE — 90662 IIV NO PRSV INCREASED AG IM: CPT | Performed by: FAMILY MEDICINE

## 2021-12-01 PROCEDURE — G0008 ADMIN INFLUENZA VIRUS VAC: HCPCS | Performed by: FAMILY MEDICINE

## 2021-12-01 PROCEDURE — 99214 OFFICE O/P EST MOD 30 MIN: CPT | Performed by: FAMILY MEDICINE

## 2021-12-01 RX ORDER — CLOPIDOGREL BISULFATE 75 MG/1
TABLET ORAL
Qty: 30 TABLET | Refills: 1 | Status: SHIPPED | OUTPATIENT
Start: 2021-12-01

## 2021-12-01 RX ORDER — CITALOPRAM 20 MG/1
20 TABLET ORAL DAILY
Qty: 90 TABLET | Refills: 1 | Status: SHIPPED | OUTPATIENT
Start: 2021-12-01 | End: 2022-06-30

## 2021-12-01 RX ORDER — LINACLOTIDE 145 UG/1
145 CAPSULE, GELATIN COATED ORAL
Qty: 30 CAPSULE | Refills: 5 | Status: CANCELLED | OUTPATIENT
Start: 2021-12-01 | End: 2022-05-30

## 2021-12-01 RX ORDER — DOCUSATE SODIUM 100 MG/1
200 CAPSULE, LIQUID FILLED ORAL NIGHTLY
COMMUNITY

## 2021-12-01 RX ORDER — OLMESARTAN MEDOXOMIL 40 MG/1
40 TABLET ORAL DAILY
Qty: 90 TABLET | Refills: 1 | Status: SHIPPED | OUTPATIENT
Start: 2021-12-01 | End: 2022-06-30

## 2021-12-01 NOTE — PROGRESS NOTES
"Chief Complaint  Hypertension    Subjective          Sandy presents to [facility] to refill medications.  Blood pressure controlled.  Depression stable.  Constipation is controlled with over-the-counter therapies.  She continues to take clopedigrel for her history of TIA in the past. Labs are due. She had transfusions and most recent hemoglobin in October was better but still below 10. She is due for follow-up labs for her other conditions. She only takes clopedigrel twice weekly due to problems with easy bleeding bruising with daily use of clopedigrel.          Objective   Vital Signs:   Vitals:    12/01/21 1616   BP: 140/83   Pulse: 69   Resp: 16   Temp: 97.2 °F (36.2 °C)   TempSrc: Skin   SpO2: 98%   Weight: 58.5 kg (129 lb)   Height: 157.5 cm (62.01\")        Physical Exam  Vitals reviewed.   Constitutional:       General: She is not in acute distress.  Eyes:      General: Lids are normal.      Conjunctiva/sclera: Conjunctivae normal.   Neck:      Vascular: No carotid bruit.      Trachea: No tracheal deviation.   Cardiovascular:      Rate and Rhythm: Normal rate and regular rhythm.      Heart sounds: Normal heart sounds. No murmur heard.      Pulmonary:      Effort: Pulmonary effort is normal.      Breath sounds: Normal breath sounds.   Musculoskeletal:      Right shoulder: Decreased range of motion.   Skin:     General: Skin is warm and dry.   Neurological:      Mental Status: She is alert. She is not disoriented.   Psychiatric:         Speech: Speech normal.         Behavior: Behavior normal. Behavior is cooperative.          Result Review :                Assessment and Plan    Diagnoses and all orders for this visit:    1. Essential hypertension (Primary)  Assessment & Plan:  Hypertension is unchanged.  Continue current treatment regimen.  Blood pressure will be reassessed at the next regular appointment.    Orders:  -     olmesartan (BENICAR) 40 MG tablet; Take 1 tablet by mouth " Daily for 180 days.  Dispense: 90 tablet; Refill: 1    2. Grief reaction  Comments:  Steven()  2016  Orders:  -     citalopram (CeleXA) 20 MG tablet; Take 1 tablet by mouth Daily for 180 days.  Dispense: 90 tablet; Refill: 1    3. Recurrent major depressive disorder, in full remission ([facility])  Assessment & Plan:  Patient's depression is recurrent and is mild without psychosis. Their depression is currently in full remission and the condition is unchanged. This will be reassessed at the next regular appointment. F/U as described:patient will continue current medication therapy.    Orders:  -     citalopram (CeleXA) 20 MG tablet; Take 1 tablet by mouth Daily for 180 days.  Dispense: 90 tablet; Refill: 1    4. Chronic idiopathic constipation  Assessment & Plan:  The current medical regimen is effective;  continue present plan and medications.        5. History of TIA (transient ischemic attack)  Assessment & Plan:  The current medical regimen is effective;  continue present plan and medications.      Orders:  -     clopidogrel (PLAVIX) 75 MG tablet; Take one po q Monday and Thursday  Dispense: 30 tablet; Refill: 1    6. Need for influenza vaccination  -     Fluzone High-Dose 65+yrs    7. Dark urine  Comments:  check UA  Orders:  -     Urinalysis With Culture If Indicated -; Future      Follow Up   Return in about 6 months (around 2022) for Medicare Wellness & regular visit, [id number] min.  Patient was given instructions and counseling regarding her condition or for health maintenance advice. Please see specific information pulled into the AVS if appropriate.

## 2021-12-01 NOTE — PROGRESS NOTES
Immunization  Immunization performed in LD by Azul Harris MA. Patient tolerated the procedure well without complications.  12/01/21   Azul Harris MA

## 2021-12-02 DIAGNOSIS — R82.998 DARK URINE: ICD-10-CM

## 2021-12-16 ENCOUNTER — TELEMEDICINE (OUTPATIENT)
Dept: FAMILY MEDICINE CLINIC | Facility: CLINIC | Age: 81
End: 2021-12-16

## 2021-12-16 VITALS
RESPIRATION RATE: 16 BRPM | HEART RATE: 70 BPM | TEMPERATURE: 96.9 F | OXYGEN SATURATION: 99 % | DIASTOLIC BLOOD PRESSURE: 73 MMHG | SYSTOLIC BLOOD PRESSURE: 115 MMHG | HEIGHT: 62 IN | WEIGHT: 127 LBS | BODY MASS INDEX: 23.37 KG/M2

## 2021-12-16 DIAGNOSIS — N39.46 MIXED STRESS AND URGE URINARY INCONTINENCE: ICD-10-CM

## 2021-12-16 DIAGNOSIS — N18.31 STAGE 3A CHRONIC KIDNEY DISEASE (HCC): ICD-10-CM

## 2021-12-16 DIAGNOSIS — D50.0 IRON DEFICIENCY ANEMIA DUE TO CHRONIC BLOOD LOSS: ICD-10-CM

## 2021-12-16 DIAGNOSIS — E78.00 PURE HYPERCHOLESTEROLEMIA: ICD-10-CM

## 2021-12-16 DIAGNOSIS — I10 ESSENTIAL HYPERTENSION: Primary | ICD-10-CM

## 2021-12-16 PROBLEM — N18.30 STAGE 3 CHRONIC KIDNEY DISEASE (HCC): Status: ACTIVE | Noted: 2021-12-16

## 2021-12-16 PROCEDURE — 99214 OFFICE O/P EST MOD 30 MIN: CPT | Performed by: FAMILY MEDICINE

## 2021-12-16 RX ORDER — ATORVASTATIN CALCIUM 10 MG/1
10 TABLET, FILM COATED ORAL NIGHTLY
Qty: 90 TABLET | Refills: 1 | Status: SHIPPED | OUTPATIENT
Start: 2021-12-16 | End: 2022-07-05

## 2021-12-16 NOTE — PATIENT INSTRUCTIONS
Cholesterol Content in Foods  Cholesterol is a waxy, fat-like substance that helps to carry fat in the blood. The body needs cholesterol in small amounts, but too much cholesterol can cause damage to the arteries and heart.  Most people should eat less than 200 milligrams (mg) of cholesterol a day.  Foods with cholesterol    Cholesterol is found in animal-based foods, such as meat, seafood, and dairy. Generally, low-fat dairy and lean meats have less cholesterol than full-fat dairy and fatty meats. The milligrams of cholesterol per serving (mg per serving) of common cholesterol-containing foods are listed below.  Meat and other proteins  · Egg -- one large whole egg has 186 mg.  · Veal shank -- 4 oz has 141 mg.  · Lean ground turkey (93% lean) -- 4 oz has 118 mg.  · Fat-trimmed lamb loin -- 4 oz has 106 mg.  · Lean ground beef (90% lean) -- 4 oz has 100 mg.  · Lobster -- 3.5 oz has 90 mg.  · Pork loin chops -- 4 oz has 86 mg.  · Canned salmon -- 3.5 oz has 83 mg.  · Fat-trimmed beef top loin -- 4 oz has 78 mg.  · Frankfurter -- 1 alex (3.5 oz) has 77 mg.  · Crab -- 3.5 oz has 71 mg.  · Roasted chicken without skin, white meat -- 4 oz has 66 mg.  · Light bologna -- 2 oz has 45 mg.  · Deli-cut turkey -- 2 oz has 31 mg.  · Canned tuna -- 3.5 oz has 31 mg.  · Lu -- 1 oz has 29 mg.  · Oysters and mussels (raw) -- 3.5 oz has 25 mg.  · Mackerel -- 1 oz has 22 mg.  · Trout -- 1 oz has 20 mg.  · Pork sausage -- 1 link (1 oz) has 17 mg.  · [place] -- 1 oz has 16 mg.  · Tilapia -- 1 oz has 14 mg.  Dairy  · Soft-serve ice cream -- ½ cup (4 oz) has 103 mg.  · Whole-milk yogurt -- 1 cup (8 oz) has 29 mg.  · Cheddar cheese -- 1 oz has 28 mg.  · American cheese -- 1 oz has 28 mg.  · Whole milk -- 1 cup (8 oz) has 23 mg.  · 2% milk -- 1 cup (8 oz) has 18 mg.  · Cream cheese -- 1 tablespoon (Tbsp) has 15 mg.  · Cottage cheese -- ½ cup (4 oz) has 14 mg.  · Low-fat (1%) milk -- 1 cup (8 oz) has 10 mg.  · Sour cream -- 1 Tbsp has 8.5  mg.  · Low-fat yogurt -- 1 cup (8 oz) has 8 mg.  · Nonfat Greek yogurt -- 1 cup (8 oz) has 7 mg.  · Half-and-half cream -- 1 Tbsp has 5 mg.  Fats and oils  · Cod liver oil -- 1 tablespoon (Tbsp) has 82 mg.  · Butter -- 1 Tbsp has 15 mg.  · Lard -- 1 Tbsp has 14 mg.  · Lu grease -- 1 Tbsp has 14 mg.  · Mayonnaise -- 1 Tbsp has 5-10 mg.  · Margarine -- 1 Tbsp has 3-10 mg.  Exact amounts of cholesterol in these foods may vary depending on specific ingredients and brands.  Foods without cholesterol  Most plant-based foods do not have cholesterol unless you combine them with a food that has cholesterol. Foods without cholesterol include:  · Grains and cereals.  · Vegetables.  · Fruits.  · Vegetable oils, such as olive, canola, and sunflower oil.  · Legumes, such as peas, beans, and lentils.  · Nuts and seeds.  · Egg whites.  Summary  · The body needs cholesterol in small amounts, but too much cholesterol can cause damage to the arteries and heart.  · Most people should eat less than 200 milligrams (mg) of cholesterol a day.  This information is not intended to replace advice given to you by your health care provider. Make sure you discuss any questions you have with your health care provider.  Document Revised: 05/10/2021 Document Reviewed: 05/10/2021  Elsevier Patient Education © 2021 Elsevier Inc.       Pulse Count: 255

## 2021-12-16 NOTE — ASSESSMENT & PLAN NOTE
Ongoing problems with intermittent urge incontinence.  Check urine and reflex to urine culture if indicated.  New order created today.

## 2021-12-16 NOTE — PROGRESS NOTES
"Chief Complaint  Hypertension    Subjective          Sandy presents to [facility] to follow-up on labs.  Blood pressure control.  Lipids are above goal.  She continues to have some urge incontinence and would like to get urinalysis and culture if needed.  This was ordered but unfortunately was not run on most recent labs.  Previously abnormal CBC is now within normal limits.  Mild elevation of blood sugar noted.  Mild kidney function abnormality noted.          Objective   Vital Signs:   Vitals:    12/16/21 1517   BP: 115/73   Pulse: 70   Resp: 16   Temp: 96.9 °F (36.1 °C)   TempSrc: Skin   SpO2: 99%   Weight: 57.6 kg (127 lb)   Height: 157.5 cm (62\")        Physical Exam  Vitals reviewed.   Constitutional:       General: She is not in acute distress.  Eyes:      General: Lids are normal.      Conjunctiva/sclera: Conjunctivae normal.   Neck:      Vascular: No carotid bruit.      Trachea: No tracheal deviation.   Cardiovascular:      Rate and Rhythm: Normal rate and regular rhythm.      Heart sounds: Normal heart sounds. No murmur heard.      Pulmonary:      Effort: Pulmonary effort is normal.      Breath sounds: Normal breath sounds.   Skin:     General: Skin is warm and dry.   Neurological:      Mental Status: She is alert. She is not disoriented.   Psychiatric:         Speech: Speech normal.         Behavior: Behavior normal. Behavior is cooperative.          Result Review :       Hepatitis C Antibody (12/02/2021 09:30)  Folate (12/02/2021 09:30)  Methylmalonic Acid, Serum (12/02/2021 09:30)  Vitamin B12 (12/02/2021 09:30)  Iron Profile (12/02/2021 09:30)  TSH (12/02/2021 09:30)  Lipid Panel With / Chol / HDL Ratio (12/02/2021 09:30)  CBC & Differential (12/02/2021 09:30)  Comprehensive Metabolic Panel (12/02/2021 09:30)           Assessment and Plan    Diagnoses and all orders for this visit:    1. Essential hypertension (Primary)  Assessment & Plan:  Hypertension is " unchanged.  Continue current treatment regimen.  Blood pressure will be reassessed at the next regular appointment.    Orders:  -     Comprehensive Metabolic Panel; Future  -     CBC & Differential; Future  -     MicroAlbumin, Urine, Random - Urine, Clean Catch; Future    2. Pure hypercholesterolemia  Assessment & Plan:  Lipid abnormalities are newly identified.  Nutritional counseling was provided. and Pharmacotherapy as ordered.  And atorvastatin 10 mg bedtime dosing to current regimen.  Recheck with labs in 6 months.  Lipids will be reassessed in 6 months.    Orders:  -     atorvastatin (LIPITOR) 10 MG tablet; Take 1 tablet by mouth Every Night for 180 days.  Dispense: 90 tablet; Refill: 1  -     Comprehensive Metabolic Panel; Future  -     CBC & Differential; Future  -     Lipid Panel With / Chol / HDL Ratio; Future  -     CK; Future    3. Mixed stress and urge urinary incontinence  Assessment & Plan:  Ongoing problems with intermittent urge incontinence.  Check urine and reflex to urine culture if indicated.  New order created today.    Orders:  -     Urinalysis With Culture If Indicated -; Future  -     MicroAlbumin, Urine, Random - Urine, Clean Catch; Future    4. Stage 3a chronic kidney disease (HCC)  Assessment & Plan:  Renal condition is unchanged.  Continue current treatment regimen.  Renal condition will be reassessed in 6 months.    Orders:  -     Comprehensive Metabolic Panel; Future  -     MicroAlbumin, Urine, Random - Urine, Clean Catch; Future    5. Iron deficiency anemia due to chronic blood loss  Assessment & Plan:  Resolved on recent labs.        Follow Up   Return in about 6 months (around 6/16/2022) for Medicare Wellness & regular visit, [id number] min.  Patient was given instructions and counseling regarding her condition or for health maintenance advice. Please see specific information pulled into the AVS if appropriate.

## 2021-12-16 NOTE — ASSESSMENT & PLAN NOTE
Lipid abnormalities are newly identified.  Nutritional counseling was provided. and Pharmacotherapy as ordered.  And atorvastatin 10 mg bedtime dosing to current regimen.  Recheck with labs in 6 months.  Lipids will be reassessed in 6 months.

## 2021-12-21 DIAGNOSIS — B96.20 E-COLI UTI: Primary | ICD-10-CM

## 2021-12-21 DIAGNOSIS — N39.0 E-COLI UTI: Primary | ICD-10-CM

## 2021-12-21 LAB
APPEARANCE UR: ABNORMAL
BACTERIA #/AREA URNS HPF: ABNORMAL /[HPF]
BACTERIA UR CULT: ABNORMAL
BACTERIA UR CULT: ABNORMAL
BILIRUB UR QL STRIP: NEGATIVE
CASTS URNS QL MICRO: ABNORMAL /LPF
COLOR UR: YELLOW
CRYSTALS URNS MICRO: ABNORMAL
EPI CELLS #/AREA URNS HPF: ABNORMAL /HPF (ref 0–10)
GLUCOSE UR QL: NEGATIVE
HGB UR QL STRIP: NEGATIVE
KETONES UR QL STRIP: ABNORMAL
LEUKOCYTE ESTERASE UR QL STRIP: NEGATIVE
MICRO URNS: ABNORMAL
NITRITE UR QL STRIP: POSITIVE
OTHER ANTIBIOTIC SUSC ISLT: ABNORMAL
PH UR STRIP: 6 [PH] (ref 5–7.5)
PROT UR QL STRIP: ABNORMAL
RBC #/AREA URNS HPF: ABNORMAL /HPF (ref 0–2)
SP GR UR: 1.02 (ref 1–1.03)
UNIDENT CRYS URNS QL MICRO: PRESENT
URINALYSIS REFLEX: ABNORMAL
UROBILINOGEN UR STRIP-MCNC: 0.2 MG/DL (ref 0.2–1)
WBC #/AREA URNS HPF: ABNORMAL /HPF (ref 0–5)

## 2021-12-21 RX ORDER — NITROFURANTOIN 25; 75 MG/1; MG/1
100 CAPSULE ORAL 2 TIMES DAILY
Qty: 10 CAPSULE | Refills: 0 | Status: SHIPPED | OUTPATIENT
Start: 2021-12-21 | End: 2021-12-26

## 2021-12-21 NOTE — PROGRESS NOTES
Call patient and let her know that urine showed e. Coli infection. Rx of nitrofurantoin sent to her pharmacy of choice. Set her up for nurse appointment next week for urine test of cure. Dr. Appiah

## 2022-06-30 ENCOUNTER — OFFICE VISIT (OUTPATIENT)
Dept: FAMILY MEDICINE CLINIC | Facility: CLINIC | Age: 82
End: 2022-06-30

## 2022-06-30 VITALS
RESPIRATION RATE: 18 BRPM | HEIGHT: 62 IN | TEMPERATURE: 97.4 F | OXYGEN SATURATION: 98 % | WEIGHT: 128 LBS | BODY MASS INDEX: 23.55 KG/M2 | DIASTOLIC BLOOD PRESSURE: 74 MMHG | SYSTOLIC BLOOD PRESSURE: 108 MMHG | HEART RATE: 69 BPM

## 2022-06-30 DIAGNOSIS — N76.2 ACUTE VULVITIS: ICD-10-CM

## 2022-06-30 DIAGNOSIS — F33.42 RECURRENT MAJOR DEPRESSIVE DISORDER, IN FULL REMISSION: ICD-10-CM

## 2022-06-30 DIAGNOSIS — I10 ESSENTIAL HYPERTENSION: Primary | ICD-10-CM

## 2022-06-30 DIAGNOSIS — F43.21 GRIEF REACTION: ICD-10-CM

## 2022-06-30 PROCEDURE — 99214 OFFICE O/P EST MOD 30 MIN: CPT | Performed by: FAMILY MEDICINE

## 2022-06-30 RX ORDER — OLMESARTAN MEDOXOMIL 40 MG/1
40 TABLET ORAL DAILY
Qty: 90 TABLET | Refills: 2 | Status: SHIPPED | OUTPATIENT
Start: 2022-06-30 | End: 2023-01-26 | Stop reason: SDUPTHER

## 2022-06-30 RX ORDER — DONEPEZIL HYDROCHLORIDE 10 MG/1
10 TABLET, FILM COATED ORAL DAILY
COMMUNITY
Start: 2022-05-19

## 2022-06-30 RX ORDER — CITALOPRAM 20 MG/1
20 TABLET ORAL DAILY
Qty: 90 TABLET | Refills: 2 | Status: SHIPPED | OUTPATIENT
Start: 2022-06-30 | End: 2023-02-01 | Stop reason: SDUPTHER

## 2022-06-30 NOTE — PROGRESS NOTES
"Chief Complaint  Hypertension (6 month f/u) and Rash (On vaginal area, wears pads for incontinence )    Subjective          Sandy presents to [facility]  To refill medicines.  Blood pressure control.  Depression stable on current therapy.    Over the past month patient has had an irritant vaginal rash.  She does have urinary leakage and does wear pads.  Nothing so far that she is use over-the-counter has been helpful.        Objective   Vital Signs:   Vitals:    06/30/22 1632   BP: 108/74   Pulse: 69   Resp: 18   Temp: 97.4 °F (36.3 °C)   SpO2: 98%   Weight: 58.1 kg (128 lb)   Height: 157.5 cm (62\")        BMI is within normal parameters. No other follow-up for BMI required.        Physical Exam  Vitals reviewed. Exam conducted with a chaperone present.   Constitutional:       General: She is not in acute distress.  Eyes:      General: Lids are normal.      Conjunctiva/sclera: Conjunctivae normal.   Neck:      Vascular: No carotid bruit.      Trachea: No tracheal deviation.   Cardiovascular:      Rate and Rhythm: Normal rate and regular rhythm.      Heart sounds: Normal heart sounds. No murmur heard.  Pulmonary:      Effort: Pulmonary effort is normal.      Breath sounds: Normal breath sounds.   Genitourinary:     Comments: Irritant rash on vulva, not confluent. Some whitish material just in vaginal os  Skin:     General: Skin is warm and dry.   Neurological:      Mental Status: She is alert. She is not disoriented.   Psychiatric:         Speech: Speech normal.         Behavior: Behavior normal. Behavior is cooperative.          Result Review :                Assessment and Plan    Diagnoses and all orders for this visit:    1. Essential hypertension (Primary)  Assessment & Plan:  Condition is stable. The current medical regimen is effective;  continue present plan and medications.    Orders:  -     olmesartan (BENICAR) 40 MG tablet; Take 1 tablet by mouth Daily for 270 days.  Dispense: " 90 tablet; Refill: 2    2. Grief reaction  Comments:  Steven()  2016  Orders:  -     citalopram (CeleXA) 20 MG tablet; Take 1 tablet by mouth Daily for 270 days.  Dispense: 90 tablet; Refill: 2    3. Recurrent major depressive disorder, in full remission (HCC)  Assessment & Plan:  Condition is stable. The current medical regimen is effective;  continue present plan and medications.    Orders:  -     citalopram (CeleXA) 20 MG tablet; Take 1 tablet by mouth Daily for 270 days.  Dispense: 90 tablet; Refill: 2    4. Acute vulvitis  Assessment & Plan:  Treat with OTC antifungal cream for one week, if no improvement, then refer to gyn.     Orders:  -     miconazole (MICOTIN) 2 % vaginal cream; Insert 1 applicator into the vagina Every Night.  Dispense: 45 g; Refill: 0      Follow Up   Return in about 7 months (around 2023) for recheck/refill medication.  Patient was given instructions and counseling regarding her condition or for health maintenance advice. Please see specific information pulled into the AVS if appropriate.

## 2022-07-01 DIAGNOSIS — E78.00 PURE HYPERCHOLESTEROLEMIA: ICD-10-CM

## 2022-07-05 RX ORDER — ATORVASTATIN CALCIUM 10 MG/1
TABLET, FILM COATED ORAL
Qty: 90 TABLET | Refills: 1 | Status: SHIPPED | OUTPATIENT
Start: 2022-07-05 | End: 2023-01-26

## 2022-08-16 ENCOUNTER — TELEPHONE (OUTPATIENT)
Dept: FAMILY MEDICINE CLINIC | Facility: CLINIC | Age: 82
End: 2022-08-16

## 2022-08-16 DIAGNOSIS — N39.0 URINARY TRACT INFECTION WITHOUT HEMATURIA, SITE UNSPECIFIED: Primary | ICD-10-CM

## 2022-08-22 ENCOUNTER — TELEPHONE (OUTPATIENT)
Dept: FAMILY MEDICINE CLINIC | Facility: CLINIC | Age: 82
End: 2022-08-22

## 2022-08-22 DIAGNOSIS — N39.0 URINARY TRACT INFECTION WITHOUT HEMATURIA, SITE UNSPECIFIED: Primary | ICD-10-CM

## 2022-08-22 RX ORDER — CIPROFLOXACIN 250 MG/1
250 TABLET, FILM COATED ORAL 2 TIMES DAILY
Qty: 6 TABLET | Refills: 0 | Status: SHIPPED | OUTPATIENT
Start: 2022-08-22 | End: 2022-08-25

## 2022-08-22 NOTE — TELEPHONE ENCOUNTER
Caller: Layla Garg     Relationship: DAUGHTER    Best call back number: [phone]       What is your medical concern?     PATIENT TESTED POSITIVE FOR A UTI.  THE RESULTS WHERE POSTED TO [employer].  LAYLA WANTS TO KNOW IS THERE GOING TO BE SOME MEDICATION CALLED IN FOR HER MOTHER.  THE CONDITION HAS NOT RESOLVED ITSELF.    PLEASE CALL DAUGHTER, IF UNABLE TO ANSWER PHONE LEAVE A MESSAGE OR YOU CAN POST IT TO HER aitainmentT.

## 2022-08-22 NOTE — TELEPHONE ENCOUNTER
Call patient and let them know I sent in a prescription of ciprofloxacin to local pharmacy on record.  Have patient make a nurse appointment in 1 week for test of cure.  Thanks, Dr. Appiah

## 2022-09-03 LAB
APPEARANCE UR: CLEAR
BACTERIA #/AREA URNS HPF: NORMAL /[HPF]
BACTERIA UR CULT: NORMAL
BACTERIA UR CULT: NORMAL
BILIRUB UR QL STRIP: NEGATIVE
CASTS URNS QL MICRO: NORMAL /LPF
COLOR UR: YELLOW
EPI CELLS #/AREA URNS HPF: NORMAL /HPF (ref 0–10)
GLUCOSE UR QL STRIP: NEGATIVE
HGB UR QL STRIP: NEGATIVE
KETONES UR QL STRIP: NEGATIVE
LEUKOCYTE ESTERASE UR QL STRIP: ABNORMAL
MICRO URNS: ABNORMAL
NITRITE UR QL STRIP: NEGATIVE
PH UR STRIP: 7 [PH] (ref 5–7.5)
PROT UR QL STRIP: ABNORMAL
RBC #/AREA URNS HPF: NORMAL /HPF (ref 0–2)
SP GR UR STRIP: 1.02 (ref 1–1.03)
URINALYSIS REFLEX: ABNORMAL
UROBILINOGEN UR STRIP-MCNC: 0.2 MG/DL (ref 0.2–1)
WBC #/AREA URNS HPF: NORMAL /HPF (ref 0–5)

## 2022-09-29 NOTE — PROGRESS NOTES
Please call the patient regarding her abnormal result.She needs to go to the ER due to marked anemia noted. She was feeling bad in office yesterday. Dr. Appiah Applied

## 2022-11-30 ENCOUNTER — TELEPHONE (OUTPATIENT)
Dept: FAMILY MEDICINE CLINIC | Facility: CLINIC | Age: 82
End: 2022-11-30

## 2022-11-30 NOTE — TELEPHONE ENCOUNTER
Caller: Tejal Garg    Relationship: Emergency Contact    Best call back number: [phone] (Mobile)    What orders are you requesting (i.e. lab or imaging):URINALYSIS  FOR UTI TEST   DARK URINE - ODOR   FEELS BAD  SLIGHT FEVER  *  In what timeframe would the patient need to come in: ASAP    Where will you receive your lab/imaging services:   [facility]        Additional notes:   PLEASE CALL AND CONFIRM

## 2022-12-01 ENCOUNTER — OFFICE VISIT (OUTPATIENT)
Dept: FAMILY MEDICINE CLINIC | Facility: CLINIC | Age: 82
End: 2022-12-01

## 2022-12-01 VITALS
BODY MASS INDEX: 24.29 KG/M2 | OXYGEN SATURATION: 98 % | TEMPERATURE: 98 F | HEART RATE: 64 BPM | DIASTOLIC BLOOD PRESSURE: 74 MMHG | WEIGHT: 132 LBS | RESPIRATION RATE: 16 BRPM | SYSTOLIC BLOOD PRESSURE: 133 MMHG | HEIGHT: 62 IN

## 2022-12-01 DIAGNOSIS — N76.3 CHRONIC VULVITIS: ICD-10-CM

## 2022-12-01 DIAGNOSIS — N39.0 URINARY TRACT INFECTION WITHOUT HEMATURIA, SITE UNSPECIFIED: Primary | ICD-10-CM

## 2022-12-01 DIAGNOSIS — N39.46 MIXED STRESS AND URGE URINARY INCONTINENCE: ICD-10-CM

## 2022-12-01 DIAGNOSIS — R35.0 URINARY FREQUENCY: ICD-10-CM

## 2022-12-01 DIAGNOSIS — R30.0 DYSURIA: ICD-10-CM

## 2022-12-01 LAB
BILIRUB BLD-MCNC: NEGATIVE MG/DL
CLARITY, POC: CLEAR
COLOR UR: YELLOW
EXPIRATION DATE: ABNORMAL
GLUCOSE UR STRIP-MCNC: NEGATIVE MG/DL
KETONES UR QL: ABNORMAL
LEUKOCYTE EST, POC: ABNORMAL
Lab: ABNORMAL
NITRITE UR-MCNC: NEGATIVE MG/ML
PH UR: 6.5 [PH] (ref 5–8)
PROT UR STRIP-MCNC: NEGATIVE MG/DL
RBC # UR STRIP: NEGATIVE /UL
SP GR UR: 1.02 (ref 1–1.03)
UROBILINOGEN UR QL: ABNORMAL

## 2022-12-01 PROCEDURE — 81003 URINALYSIS AUTO W/O SCOPE: CPT

## 2022-12-01 PROCEDURE — 99214 OFFICE O/P EST MOD 30 MIN: CPT

## 2022-12-01 RX ORDER — CIPROFLOXACIN 250 MG/1
250 TABLET, FILM COATED ORAL 2 TIMES DAILY
Qty: 10 TABLET | Refills: 0 | Status: SHIPPED | OUTPATIENT
Start: 2022-12-01 | End: 2022-12-06

## 2022-12-01 RX ORDER — FLUCONAZOLE 150 MG/1
150 TABLET ORAL ONCE
Qty: 1 TABLET | Refills: 0 | Status: SHIPPED | OUTPATIENT
Start: 2022-12-01 | End: 2022-12-01

## 2022-12-01 NOTE — PROGRESS NOTES
"Chief Complaint  Urinary Tract Infection and Rash    Subjective          History of Present Illness    Sandy Varela 82 y.o.female complains of urinary symptoms. She  complains of dysuria, urgency and frequency.  She has had symptoms for 1 month. The symptoms are moderate.  Patient denies fever, flank pain, gross blood in urine, nausea, vomiting, abdominal pain and risk of STD.  Patient has tried  increasing fluids for relief of symptoms.  Patient does have a history of recurrent UTI.  Patient does not have a history of pyelonephritis.    She also reports chronic perineal rash/irritation.  Symptom onset was August of this year.  Treatment to date: Miconazole and Fluconazole.  She is not sexually active and has not been for several years.  No risk of STD.    Creatinine clearance is 34.383    She  denies medication side effects.    Review of Systems   Constitutional: Negative for chills, fatigue and fever.   HENT: Negative for congestion and sinus pressure.    Eyes: Negative for visual disturbance.   Respiratory: Negative for cough, chest tightness and shortness of breath.    Cardiovascular: Negative for chest pain and palpitations.   Gastrointestinal: Negative for abdominal pain, constipation, diarrhea, nausea and vomiting.   Genitourinary: Positive for dysuria, frequency, urgency, vaginal discharge and vaginal pain (irritation). Negative for decreased urine volume, difficulty urinating, enuresis, flank pain, hematuria, pelvic pain and vaginal bleeding.        Vaginal itching, urinary incontinence   Musculoskeletal: Negative for back pain.   Skin: Negative for rash.   Neurological: Negative for dizziness, syncope and light-headedness.   Psychiatric/Behavioral: Negative for behavioral problems and sleep disturbance.        Objective   Vital Signs:   /74   Pulse 64   Temp 98 °F (36.7 °C) (Oral)   Resp 16   Ht 157.5 cm (62\")   Wt 59.9 kg (132 lb)   SpO2 98%   BMI 24.14 kg/m²      BMI is within normal " parameters. No other follow-up for BMI required.        Physical Exam  Vitals and nursing note reviewed. Exam conducted with a chaperone present.   Constitutional:       General: She is not in acute distress.     Appearance: Normal appearance. She is well-developed. She is not ill-appearing or diaphoretic.   HENT:      Head: Normocephalic and atraumatic.   Eyes:      Conjunctiva/sclera: Conjunctivae normal.   Pulmonary:      Effort: Pulmonary effort is normal.   Abdominal:      General: Bowel sounds are normal. There is no distension.      Palpations: Abdomen is soft. There is no mass.      Tenderness: There is no abdominal tenderness. There is no right CVA tenderness, left CVA tenderness, guarding or rebound.      Comments: No abdominal tenderness with palpation.  Abdomen palpates soft.  Negative bilateral CVA tenderness.  No guarding or rebound tenderness.   Genitourinary:     Labia:         Right: No tenderness or injury.         Left: No tenderness or injury.       Vagina: No bleeding or prolapsed vaginal walls.      Rectum: Normal.      Comments: Several tiny areas of erythema and irritation of bilateral labia.  No vaginal discharge and no bleeding.  Musculoskeletal:         General: Normal range of motion.      Cervical back: Neck supple.   Skin:     General: Skin is warm and dry.      Findings: No rash.   Neurological:      Mental Status: She is alert and oriented to person, place, and time.      Deep Tendon Reflexes: Reflexes are normal and symmetric.   Psychiatric:         Behavior: Behavior normal.         Thought Content: Thought content normal.         Judgment: Judgment normal.          The following data was reviewed by: PRABHU Templeton on 12/01/2022:  POCT urinalysis dipstick, automated (12/01/2022 12:12)  Comprehensive Metabolic Panel (12/02/2021 09:30)               Assessment and Plan      Diagnoses and all orders for this visit:    1. Urinary tract infection without hematuria, site unspecified  (Primary)  -     POCT urinalysis dipstick, automated  -     Urine Culture - Urine, Urine, Clean Catch  -     Ambulatory Referral to Gynecologic Urology  -     ciprofloxacin (Cipro) 250 MG tablet; Take 1 tablet by mouth 2 (Two) Times a Day for 5 days.  Dispense: 10 tablet; Refill: 0    2. Dysuria  -     POCT urinalysis dipstick, automated  -     Urine Culture - Urine, Urine, Clean Catch  -     Ambulatory Referral to Gynecologic Urology  -     ciprofloxacin (Cipro) 250 MG tablet; Take 1 tablet by mouth 2 (Two) Times a Day for 5 days.  Dispense: 10 tablet; Refill: 0    3. Urinary frequency  -     POCT urinalysis dipstick, automated  -     Urine Culture - Urine, Urine, Clean Catch  -     Ambulatory Referral to Gynecologic Urology  -     ciprofloxacin (Cipro) 250 MG tablet; Take 1 tablet by mouth 2 (Two) Times a Day for 5 days.  Dispense: 10 tablet; Refill: 0    4. Mixed stress and urge urinary incontinence  -     POCT urinalysis dipstick, automated  -     Urine Culture - Urine, Urine, Clean Catch  -     Ambulatory Referral to Gynecologic Urology    5. Chronic vulvitis  -     Ambulatory Referral to Gynecologic Urology  -     fluconazole (Diflucan) 150 MG tablet; Take 1 tablet by mouth 1 (One) Time for 1 dose.  Dispense: 1 tablet; Refill: 0  -     miconazole (MICOTIN) 2 % vaginal cream; Insert 1 applicator into the vagina Every Night.  Dispense: 45 g; Refill: 0            Follow Up     Return if symptoms worsen or fail to improve.    Patient was given instructions and counseling regarding her condition or for health maintenance advice. Please see specific information pulled into the AVS if appropriate.     -The patient was instructed to take medication as prescribed and until completed.  -Will send urine specimen for urine culture.  Will change antibiotic if deemed necessary from urine culture results.  -Increase daily fluid intake to 2 to 3 L/day, restrict high oxalate foods such as beans/spinach/beets/potato  chips/french fries/nuts/tea.   -Maintain proper urinary and perineal hygiene.  -Seek immediate medical attention for severe pain in back or lower abdomen, fever, nausea and vomiting, chills, vaginal bleeding, or any other worsening signs or symptoms.  -Return to the office if symptoms worsen or fail to improve.

## 2022-12-05 LAB
BACTERIA UR CULT: NORMAL
BACTERIA UR CULT: NORMAL
Lab: NORMAL

## 2022-12-06 DIAGNOSIS — N39.0 URINARY TRACT INFECTION WITHOUT HEMATURIA, SITE UNSPECIFIED: Primary | ICD-10-CM

## 2022-12-07 ENCOUNTER — TELEPHONE (OUTPATIENT)
Dept: FAMILY MEDICINE CLINIC | Facility: CLINIC | Age: 82
End: 2022-12-07

## 2022-12-07 DIAGNOSIS — N76.3 CHRONIC VULVITIS: ICD-10-CM

## 2022-12-07 NOTE — TELEPHONE ENCOUNTER
Called and spoke with patient.  She states that she was not aware to  medication.  She will call Pharmacy.  Cream was not sent, so I resent it

## 2023-01-26 DIAGNOSIS — I10 ESSENTIAL HYPERTENSION: ICD-10-CM

## 2023-01-26 DIAGNOSIS — E78.00 PURE HYPERCHOLESTEROLEMIA: ICD-10-CM

## 2023-01-26 RX ORDER — ATORVASTATIN CALCIUM 10 MG/1
TABLET, FILM COATED ORAL
Qty: 14 TABLET | Refills: 0 | Status: SHIPPED | OUTPATIENT
Start: 2023-01-26 | End: 2023-02-01 | Stop reason: SDUPTHER

## 2023-01-26 NOTE — TELEPHONE ENCOUNTER
Caller: Tejal Garg    Relationship: Emergency Contact    Best call back number: [phone]    Requested Prescriptions:   Requested Prescriptions     Pending Prescriptions Disp Refills   • atorvastatin (LIPITOR) 10 MG tablet 90 tablet 1     Sig: Take 1 tablet by mouth every night at bedtime.   • olmesartan (BENICAR) 40 MG tablet 90 tablet 2     Sig: Take 1 tablet by mouth Daily for 270 days.        Pharmacy where request should be sent: [id number] [address] - [phone]  - [phone] FX     Additional details provided by patient:     Does the patient have less than a 3 day supply:  [x] Yes  [] No    Would you like a call back once the refill request has been completed: [x] Yes [] No    If the office needs to give you a call back, can they leave a voicemail: [x] Yes [] No    Marcia Bonilla Rep   01/26/23 10:44 EST

## 2023-01-27 RX ORDER — OLMESARTAN MEDOXOMIL 40 MG/1
40 TABLET ORAL DAILY
Qty: 7 TABLET | Refills: 0 | Status: SHIPPED | OUTPATIENT
Start: 2023-01-27 | End: 2023-02-01 | Stop reason: SDUPTHER

## 2023-01-27 RX ORDER — ATORVASTATIN CALCIUM 10 MG/1
10 TABLET, FILM COATED ORAL
Qty: 90 TABLET | Refills: 1 | OUTPATIENT
Start: 2023-01-27

## 2023-02-01 ENCOUNTER — OFFICE VISIT (OUTPATIENT)
Dept: FAMILY MEDICINE CLINIC | Facility: CLINIC | Age: 83
End: 2023-02-01
Payer: MEDICARE

## 2023-02-01 VITALS
RESPIRATION RATE: 18 BRPM | WEIGHT: 129 LBS | OXYGEN SATURATION: 96 % | DIASTOLIC BLOOD PRESSURE: 72 MMHG | HEART RATE: 101 BPM | HEIGHT: 62 IN | SYSTOLIC BLOOD PRESSURE: 126 MMHG | BODY MASS INDEX: 23.74 KG/M2

## 2023-02-01 DIAGNOSIS — I10 ESSENTIAL HYPERTENSION: Primary | ICD-10-CM

## 2023-02-01 DIAGNOSIS — F33.42 RECURRENT MAJOR DEPRESSIVE DISORDER, IN FULL REMISSION: ICD-10-CM

## 2023-02-01 DIAGNOSIS — E78.00 PURE HYPERCHOLESTEROLEMIA: ICD-10-CM

## 2023-02-01 DIAGNOSIS — F43.21 GRIEF REACTION: ICD-10-CM

## 2023-02-01 DIAGNOSIS — N18.31 STAGE 3A CHRONIC KIDNEY DISEASE: ICD-10-CM

## 2023-02-01 DIAGNOSIS — E03.9 ACQUIRED HYPOTHYROIDISM: ICD-10-CM

## 2023-02-01 PROCEDURE — 99214 OFFICE O/P EST MOD 30 MIN: CPT | Performed by: FAMILY MEDICINE

## 2023-02-01 RX ORDER — OLMESARTAN MEDOXOMIL 40 MG/1
40 TABLET ORAL DAILY
Qty: 90 TABLET | Refills: 2 | Status: SHIPPED | OUTPATIENT
Start: 2023-02-01 | End: 2023-10-29

## 2023-02-01 RX ORDER — CITALOPRAM 20 MG/1
20 TABLET ORAL DAILY
Qty: 90 TABLET | Refills: 2 | Status: SHIPPED | OUTPATIENT
Start: 2023-02-01 | End: 2023-10-29

## 2023-02-01 RX ORDER — ATORVASTATIN CALCIUM 10 MG/1
10 TABLET, FILM COATED ORAL
Qty: 90 TABLET | Refills: 2 | Status: SHIPPED | OUTPATIENT
Start: 2023-02-01 | End: 2023-10-29

## 2023-02-01 RX ORDER — AZELASTINE 1 MG/ML
SPRAY, METERED NASAL
COMMUNITY
Start: 2023-01-20

## 2023-02-01 RX ORDER — MEMANTINE HYDROCHLORIDE 10 MG/1
10 TABLET ORAL
COMMUNITY
Start: 2022-10-03

## 2023-02-01 RX ORDER — MIRTAZAPINE 15 MG/1
15 TABLET, FILM COATED ORAL NIGHTLY
COMMUNITY
Start: 2022-11-28

## 2023-02-01 NOTE — PROGRESS NOTES
"Chief Complaint  URI (Follow up from Urgent care, better now )    Subjective          Sandy presents to [facility]   for medication refill and also to follow-up on recent urgent care center visit.  She was recently seen for upper respiratory infection and treated.  Today she is doing much better.  Blood pressure control.  Labs are due.  She continues to take medication for    depression and also cholesterol.               Objective   Vital Signs:   Vitals:    02/01/23 1107   BP: 126/72   Pulse: 101   Resp: 18   SpO2: 96%   Weight: 58.5 kg (129 lb)   Height: 157.5 cm (62\")        BMI is within normal parameters. No other follow-up for BMI required.        Physical Exam  Vitals reviewed.   Constitutional:       General: She is not in acute distress.  Eyes:      General: Lids are normal.      Conjunctiva/sclera: Conjunctivae normal.   Neck:      Vascular: No carotid bruit.      Trachea: No tracheal deviation.   Cardiovascular:      Rate and Rhythm: Normal rate and regular rhythm.      Heart sounds: Normal heart sounds. No murmur heard.  Pulmonary:      Effort: Pulmonary effort is normal.      Breath sounds: Normal breath sounds.   Skin:     General: Skin is warm and dry.   Neurological:      Mental Status: She is alert. She is not disoriented.   Psychiatric:         Speech: Speech normal.         Behavior: Behavior normal. Behavior is cooperative.          Result Review :                Assessment and Plan    Diagnoses and all orders for this visit:    1. Essential hypertension (Primary)  Assessment & Plan:  Condition is stable. The current medical regimen is effective;  continue present plan and medications.    Orders:  -     olmesartan (BENICAR) 40 MG tablet; Take 1 tablet by mouth Daily for 270 days.  Dispense: 90 tablet; Refill: 2  -     Comprehensive Metabolic Panel; Future  -     CBC & Differential; Future  -     MicroAlbumin, Urine, Random - Urine, Clean Catch; Future  -     " Urinalysis With Microscopic - Urine, Clean Catch; Future    2. Pure hypercholesterolemia  Assessment & Plan:  The current medical regimen is effective;  continue present plan and medications. Labs due    Orders:  -     atorvastatin (LIPITOR) 10 MG tablet; Take 1 tablet by mouth every night at bedtime for 270 days.  Dispense: 90 tablet; Refill: 2  -     Lipid Panel; Future  -     CK; Future    3. Grief reaction  Comments:  Steven()  2016  Orders:  -     citalopram (CeleXA) 20 MG tablet; Take 1 tablet by mouth Daily for 270 days.  Dispense: 90 tablet; Refill: 2    4. Recurrent major depressive disorder, in full remission (HCC)  Assessment & Plan:  Patient's depression is single episode and is mild without psychosis. Their depression is currently in full remission and the condition is unchanged. This will be reassessed at the next regular appointment. F/U as described:patient will continue current medication therapy.    Orders:  -     citalopram (CeleXA) 20 MG tablet; Take 1 tablet by mouth Daily for 270 days.  Dispense: 90 tablet; Refill: 2    5. Stage 3a chronic kidney disease (HCC)  Assessment & Plan:  Condition stable pending lab results    Orders:  -     MicroAlbumin, Urine, Random - Urine, Clean Catch; Future  -     Urinalysis With Microscopic - Urine, Clean Catch; Future    6. Acquired hypothyroidism  -     TSH; Future      Follow Up   No follow-ups on file.  Patient was given instructions and counseling regarding her condition or for health maintenance advice. Please see specific information pulled into the AVS if appropriate.

## 2023-02-09 ENCOUNTER — OFFICE VISIT (OUTPATIENT)
Dept: FAMILY MEDICINE CLINIC | Facility: CLINIC | Age: 83
End: 2023-02-09
Payer: MEDICARE

## 2023-02-09 VITALS
TEMPERATURE: 97.6 F | WEIGHT: 126 LBS | SYSTOLIC BLOOD PRESSURE: 117 MMHG | OXYGEN SATURATION: 96 % | BODY MASS INDEX: 23.19 KG/M2 | HEART RATE: 92 BPM | DIASTOLIC BLOOD PRESSURE: 73 MMHG | HEIGHT: 62 IN | RESPIRATION RATE: 16 BRPM

## 2023-02-09 DIAGNOSIS — Z00.00 MEDICARE ANNUAL WELLNESS VISIT, SUBSEQUENT: Primary | ICD-10-CM

## 2023-02-09 DIAGNOSIS — E03.9 ACQUIRED HYPOTHYROIDISM: ICD-10-CM

## 2023-02-09 DIAGNOSIS — E78.00 PURE HYPERCHOLESTEROLEMIA: ICD-10-CM

## 2023-02-09 DIAGNOSIS — I10 ESSENTIAL HYPERTENSION: ICD-10-CM

## 2023-02-09 DIAGNOSIS — N39.46 MIXED STRESS AND URGE URINARY INCONTINENCE: ICD-10-CM

## 2023-02-09 DIAGNOSIS — F33.42 RECURRENT MAJOR DEPRESSIVE DISORDER, IN FULL REMISSION: ICD-10-CM

## 2023-02-09 PROCEDURE — 1170F FXNL STATUS ASSESSED: CPT | Performed by: NURSE PRACTITIONER

## 2023-02-09 PROCEDURE — 1125F AMNT PAIN NOTED PAIN PRSNT: CPT | Performed by: NURSE PRACTITIONER

## 2023-02-09 PROCEDURE — 1159F MED LIST DOCD IN RCRD: CPT | Performed by: NURSE PRACTITIONER

## 2023-02-09 PROCEDURE — G0439 PPPS, SUBSEQ VISIT: HCPCS | Performed by: NURSE PRACTITIONER

## 2023-02-09 PROCEDURE — 1126F AMNT PAIN NOTED NONE PRSNT: CPT | Performed by: NURSE PRACTITIONER

## 2023-02-09 PROCEDURE — 99214 OFFICE O/P EST MOD 30 MIN: CPT | Performed by: NURSE PRACTITIONER

## 2023-02-09 NOTE — PROGRESS NOTES
The ABCs of the Annual Wellness Visit  Subsequent Medicare Wellness Visit    Subjective    Sandy Varela is a 82 y.o. female who presents for a Subsequent Medicare Wellness Visit.    She is also here to follow-up on medications and labs.  Blood pressure is controlled.  Depression is stable.  We talked in brief about her urinary incontinence issue.  She seems to do much better with current medication.  Previously she tried Myrbetriq and it was not effective and also expensive.  Has information to make an appointment with Dr Dodson  She remains under the care a specialist for thyroid control.       The following portions of the patient's history were reviewed and   updated as appropriate: allergies, current medications, past family history, past medical history, past social history, past surgical history and problem list.    Compared to one year ago, the patient feels her physical   health is the same.    Compared to one year ago, the patient feels her mental   health is the same.    Recent Hospitalizations:  She was not admitted to the hospital during the last year.       Current Medical Providers:  Patient Care Team:  Anoop Appiah MD as PCP - General (Family Medicine)  Judah Carolina II, MD as Consulting Physician (Neurology)  Sandy Espinoza MD as Consulting Physician (Endocrinology)  Yang Pritchett MD as Surgeon (General Surgery)  Misha Riley MD as Consulting Physician (Hematology)  Adrianna Prasad Jr., MD as Consulting Physician (Vascular Surgery)  Reena Dodson MD as Consulting Physician (Obstetrics and Gynecology)     No hematology or vascular     Outpatient Medications Prior to Visit   Medication Sig Dispense Refill   • acetaminophen (TYLENOL) 325 MG tablet Take 650 mg by mouth Every 6 (Six) Hours As Needed for Mild Pain (1-3).     • atorvastatin (LIPITOR) 10 MG tablet Take 1 tablet by mouth every night at bedtime for 270 days. 90 tablet 2   • azelastine (ASTELIN) 0.1 % nasal spray  INSTILL 2 SPRAYS IN EACH NOSTRIL TWO TIMES A DAY AS DIRECTED FOR 14 DAYS     • Calcium Carb-Cholecalciferol (CALCIUM + D3 PO) Take  by mouth.     • Calcium Polycarbophil (FIBER-CAPS PO) Take  by mouth.     • cetirizine (zyrTEC) 5 MG tablet Take 10 mg by mouth.     • citalopram (CeleXA) 20 MG tablet Take 1 tablet by mouth Daily for 270 days. 90 tablet 2   • clopidogrel (PLAVIX) 75 MG tablet Take one po q Monday and Thursday 30 tablet 1   • docusate sodium (COLACE) 100 MG capsule Take 200 mg by mouth Every Night.     • donepezil (ARICEPT) 10 MG tablet Take 10 mg by mouth Daily.     • levothyroxine (SYNTHROID, LEVOTHROID) 88 MCG tablet Take 88 mcg by mouth Daily. Daily M-Sat and 1 1/2 tablets on Sun     • memantine (NAMENDA) 10 MG tablet Take 10 mg by mouth.     • miconazole (MICOTIN) 2 % vaginal cream Insert 1 applicator into the vagina Every Night. 45 g 0   • mirtazapine (REMERON) 15 MG tablet Take 15 mg by mouth Every Night.     • Multiple Vitamin (MULTIVITAMIN) tablet Take 1 tablet by mouth daily.     • olmesartan (BENICAR) 40 MG tablet Take 1 tablet by mouth Daily for 270 days. 90 tablet 2     No facility-administered medications prior to visit.       No opioid medication identified on active medication list. I have reviewed chart for other potential  high risk medication/s and harmful drug interactions in the elderly.          Aspirin is not on active medication list.  Aspirin use is not indicated based on review of current medical condition/s. Risk of harm outweighs potential benefits.  .    Patient Active Problem List   Diagnosis   • Grief reaction   • Essential hypertension   • Acquired hypothyroidism   • Recurrent major depressive disorder, in full remission (HCC)   • Papillary thyroid carcinoma (HCC)   • Menopausal and perimenopausal disorder   • Chronic idiopathic constipation   • History of TIA (transient ischemic attack)   • Gastric AVM   • Mixed stress and urge urinary incontinence   • Hearing decreased,  "bilateral   • Mixed conductive and sensorineural hearing loss   • Rotator cuff arthropathy   • Stage 3a chronic kidney disease (HCC)   • Pure hypercholesterolemia   • Acute vulvitis     Advance Care Planning  Advance Directive is not on file.  ACP discussion was held with the patient during this visit. Patient has an advance directive (not in EMR), copy requested.     Objective    Vitals:    02/09/23 0933   BP: 117/73   Pulse: 92   Resp: 16   Temp: 97.6 °F (36.4 °C)   TempSrc: Skin   SpO2: 96%   Weight: 57.2 kg (126 lb)   Height: 157.5 cm (62\")   PainSc: 0-No pain     Estimated body mass index is 23.05 kg/m² as calculated from the following:    Height as of this encounter: 157.5 cm (62\").    Weight as of this encounter: 57.2 kg (126 lb).    BMI is within normal parameters. No other follow-up for BMI required.      Does the patient have evidence of cognitive impairment? No          HEALTH RISK ASSESSMENT    Smoking Status:  Social History     Tobacco Use   Smoking Status Former   • Years: 5.00   • Types: Cigarettes   Smokeless Tobacco Never     Alcohol Consumption:  Social History     Substance and Sexual Activity   Alcohol Use Yes    Comment: rare glass of wine     Fall Risk Screen:    STEADI Fall Risk Assessment was completed, and patient is at MODERATE risk for falls. Assessment completed on:2/9/2023    Depression Screening:  PHQ-2/PHQ-9 Depression Screening 2/9/2023   Little Interest or Pleasure in Doing Things 0-->not at all   Feeling Down, Depressed or Hopeless 0-->not at all   PHQ-9: Brief Depression Severity Measure Score 0       Health Habits and Functional and Cognitive Screening:  Functional & Cognitive Status 2/9/2023   Do you have difficulty preparing food and eating? No   Do you have difficulty bathing yourself, getting dressed or grooming yourself? No   Do you have difficulty using the toilet? No   Do you have difficulty moving around from place to place? No   Do you have trouble with steps or getting " out of a bed or a chair? No   Current Diet Well Balanced Diet   Dental Exam Up to date   Eye Exam Up to date   Exercise (times per week) 0 times per week   Current Exercises Include No Regular Exercise   Current Exercise Activities Include -   Do you need help using the phone?  No   Are you deaf or do you have serious difficulty hearing?  Yes   Do you need help with transportation? Yes   Do you need help shopping? No   Do you need help preparing meals?  No   Do you need help with housework?  No   Do you need help with laundry? No   Do you need help taking your medications? Yes   Do you need help managing money? No   Do you ever drive or ride in a car without wearing a seat belt? No   Have you felt unusual stress, anger or loneliness in the last month? No   Who do you live with? Alone   If you need help, do you have trouble finding someone available to you? No   Do you have difficulty concentrating, remembering or making decisions? Yes       Age-appropriate Screening Schedule:  Refer to the list below for future screening recommendations based on patient's age, sex and/or medical conditions. Orders for these recommended tests are listed in the plan section. The patient has been provided with a written plan.    Health Maintenance   Topic Date Due   • ZOSTER VACCINE (2 of 2) 06/26/2014   • LIPID PANEL  12/02/2022   • DXA SCAN  02/09/2023 (Originally 7/30/2021)   • MAMMOGRAM  02/09/2024 (Originally 11/25/2021)   • TDAP/TD VACCINES (4 - Td or Tdap) 06/16/2026   • INFLUENZA VACCINE  Completed                CMS Preventative Services Quick Reference  Risk Factors Identified During Encounter  Depression/Dysphoria: Current medication is effective, no change recommended  Hearing Problem: wears hearing aides-  keep follow up appoitnments  Immunizations Discussed/Encouraged: Pneumococcal 23 and Shingrix  Tobacco Use/Dependance Risk (use dotphrase .tobaccocessation for documentation)  Urinary Incontinence: Referred to  UroGynecology  Dental Screening Recommended  Vision Screening Recommended  The above risks/problems have been discussed with the patient.  Pertinent information has been shared with the patient in the After Visit Summary.  An After Visit Summary and PPPS were made available to the patient.    Follow Up:   Next Medicare Wellness visit to be scheduled in 1 year.       Additional E&M Note during same encounter follows:  Patient has multiple medical problems which are significant and separately identifiable that require additional work above and beyond the Medicare Wellness Visit.      Chief Complaint  Medicare Wellness-subsequent    Subjective        HPI  Sandy Varela is also being seen today for medical wellness subsequent  She is also here to follow-up on medications and labs.  Blood pressure is controlled.  Depression is stable.  We talked in brief about her urinary incontinence issue.  She seems to do much better with current medication.  Previously she tried Myrbetriq and it was not effective and also expensive.  She remains under the care a specialist for thyroid control.   Has information to follow up with Dr Reena Dodson -  nayana    Cough almost completely resolved from last visit.  No sob or wheezing, no fever.    She is fasting and agreeable to labs today    No other acute C/o today  The following portions of the patient's history were reviewed and updated as appropriate: allergies, current medications, past family history, past medical history, past social history, past surgical history, and problem list      Review of Systems   Constitutional: Negative for chills, fatigue and fever.   HENT: Negative for congestion, ear discharge, ear pain, sinus pressure and sore throat.    Eyes: Negative for visual disturbance.   Respiratory: Positive for cough. Negative for shortness of breath and wheezing.    Cardiovascular: Negative for chest pain, palpitations and leg swelling.   Gastrointestinal: Negative for  "abdominal pain, diarrhea, nausea and vomiting.   Endocrine: Negative for polydipsia, polyphagia and polyuria.   Genitourinary: Positive for urgency.   Musculoskeletal: Positive for arthralgias.   Skin: Negative for rash.   Neurological: Negative for dizziness and light-headedness.   Psychiatric/Behavioral: Negative for decreased concentration, self-injury, sleep disturbance and suicidal ideas.       Objective   Vital Signs:  /73   Pulse 92   Temp 97.6 °F (36.4 °C) (Skin)   Resp 16   Ht 157.5 cm (62\")   Wt 57.2 kg (126 lb)   SpO2 96%   BMI 23.05 kg/m²     Physical Exam  Vitals reviewed.   Constitutional:       General: She is not in acute distress.  HENT:      Head: Normocephalic.      Right Ear: Tympanic membrane, ear canal and external ear normal. There is no impacted cerumen.      Left Ear: Tympanic membrane, ear canal and external ear normal. There is no impacted cerumen.      Nose: Nose normal. No congestion.      Mouth/Throat:      Mouth: Mucous membranes are moist.      Pharynx: Oropharynx is clear. No oropharyngeal exudate or posterior oropharyngeal erythema.   Eyes:      Extraocular Movements: Extraocular movements intact.      Conjunctiva/sclera: Conjunctivae normal.      Pupils: Pupils are equal, round, and reactive to light.   Neck:      Thyroid: No thyromegaly.      Vascular: No carotid bruit.   Cardiovascular:      Rate and Rhythm: Normal rate and regular rhythm.      Pulses: Normal pulses.      Heart sounds: Normal heart sounds.   Pulmonary:      Effort: Pulmonary effort is normal. No respiratory distress.      Breath sounds: Normal breath sounds. No stridor. No wheezing, rhonchi or rales.   Chest:      Chest wall: No tenderness.   Abdominal:      General: Abdomen is flat. Bowel sounds are normal. There is no distension.      Palpations: Abdomen is soft. There is no mass.      Tenderness: There is no abdominal tenderness. There is no right CVA tenderness, left CVA tenderness, guarding or " rebound.      Hernia: No hernia is present.   Musculoskeletal:         General: Normal range of motion.   Lymphadenopathy:      Cervical: No cervical adenopathy.   Skin:     General: Skin is warm.      Capillary Refill: Capillary refill takes less than 2 seconds.   Neurological:      Mental Status: She is alert and oriented to person, place, and time.   Psychiatric:         Attention and Perception: Attention normal.         Mood and Affect: Mood normal.                     Assessment and Plan   Diagnoses and all orders for this visit:    1. Medicare annual wellness visit, subsequent (Primary)  Comments:  health maintenance and immunizations reviewed  declined continued mammo/colonoscopy/ dexa related to age  declined vaccines  Orders:  -     CBC & Differential  -     Comprehensive Metabolic Panel  -     Lipid Panel  -     CK  -     TSH  -     T4, Free  -     MicroAlbumin, Urine, Random - Urine, Clean Catch  -     Urinalysis With Microscopic - Urine, Clean Catch      Low cholesterol diet  Exercise 30 minutes most days of the week  Make sure you get results on any labs or tests we ordered today  We discussed medications and how to take them as prescribed  Sleep 6-8 hours each night if possible  If you have not signed up for [facility], please activate your code ASAP from your After Visit Summary today     LDL goal <100  LDL goal if heart disease <70  HDL goal >60  Triglyceride goal <150  BP goal =<130/80  Fasting glucose <100    2. Essential hypertension  Comments:  controlled continue current managment  Orders:  -     CBC & Differential  -     Comprehensive Metabolic Panel  -     Lipid Panel  -     CK  -     TSH  -     T4, Free  -     MicroAlbumin, Urine, Random - Urine, Clean Catch  -     Urinalysis With Microscopic - Urine, Clean Catch    3. Pure hypercholesterolemia  Comments:  recheck lipid panel  continue current managment  Orders:  -     CBC & Differential  -     Comprehensive Metabolic Panel  -     Lipid  Panel  -     CK  -     TSH  -     T4, Free  -     MicroAlbumin, Urine, Random - Urine, Clean Catch  -     Urinalysis With Microscopic - Urine, Clean Catch    4. Acquired hypothyroidism  Comments:  recheck thyroid labs  keep follow ups with endocrinology as scheduled  Orders:  -     CBC & Differential  -     Comprehensive Metabolic Panel  -     Lipid Panel  -     CK  -     TSH  -     T4, Free  -     MicroAlbumin, Urine, Random - Urine, Clean Catch  -     Urinalysis With Microscopic - Urine, Clean Catch    5. Recurrent major depressive disorder, in full remission (HCC)  Comments:  controlled  denies SI/HI  phq2-0  Orders:  -     CBC & Differential  -     Comprehensive Metabolic Panel  -     Lipid Panel  -     CK  -     TSH  -     T4, Free  -     MicroAlbumin, Urine, Random - Urine, Clean Catch  -     Urinalysis With Microscopic - Urine, Clean Catch    6. Mixed stress and urge urinary incontinence  Comments:  referred to Dr Dodson-  has info to make appointment  Orders:  -     CBC & Differential  -     Urinalysis With Microscopic - Urine, Clean Catch             Follow Up   Return in about 6 months (around 8/9/2023) for Next scheduled follow up.  Patient was given instructions and counseling regarding her condition or for health maintenance advice. Please see specific information pulled into the AVS if appropriate.

## 2023-02-10 ENCOUNTER — TELEPHONE (OUTPATIENT)
Dept: FAMILY MEDICINE CLINIC | Facility: CLINIC | Age: 83
End: 2023-02-10
Payer: MEDICARE

## 2023-02-10 LAB
ALBUMIN SERPL-MCNC: 4.2 G/DL (ref 3.5–5.2)
ALBUMIN/GLOB SERPL: 1.6 G/DL
ALP SERPL-CCNC: 75 U/L (ref 39–117)
ALT SERPL-CCNC: 13 U/L (ref 1–33)
APPEARANCE UR: ABNORMAL
AST SERPL-CCNC: 22 U/L (ref 1–32)
BACTERIA #/AREA URNS HPF: ABNORMAL /HPF
BASOPHILS # BLD AUTO: 0.06 10*3/MM3 (ref 0–0.2)
BASOPHILS NFR BLD AUTO: 0.7 % (ref 0–1.5)
BILIRUB SERPL-MCNC: 0.3 MG/DL (ref 0–1.2)
BILIRUB UR QL STRIP: NEGATIVE
BUN SERPL-MCNC: 29 MG/DL (ref 8–23)
BUN/CREAT SERPL: 22.5 (ref 7–25)
CALCIUM SERPL-MCNC: 9.9 MG/DL (ref 8.6–10.5)
CASTS URNS MICRO: ABNORMAL
CHLORIDE SERPL-SCNC: 100 MMOL/L (ref 98–107)
CHOLEST SERPL-MCNC: 205 MG/DL (ref 0–200)
CK SERPL-CCNC: 67 U/L (ref 20–180)
CO2 SERPL-SCNC: 27.5 MMOL/L (ref 22–29)
COLOR UR: ABNORMAL
CREAT SERPL-MCNC: 1.29 MG/DL (ref 0.57–1)
CRYSTALS URNS MICRO: ABNORMAL
EGFRCR SERPLBLD CKD-EPI 2021: 41.8 ML/MIN/1.73
EOSINOPHIL # BLD AUTO: 0.09 10*3/MM3 (ref 0–0.4)
EOSINOPHIL NFR BLD AUTO: 1 % (ref 0.3–6.2)
EPI CELLS #/AREA URNS HPF: ABNORMAL /HPF
ERYTHROCYTE [DISTWIDTH] IN BLOOD BY AUTOMATED COUNT: 13.1 % (ref 12.3–15.4)
GLOBULIN SER CALC-MCNC: 2.6 GM/DL
GLUCOSE SERPL-MCNC: 97 MG/DL (ref 65–99)
GLUCOSE UR QL STRIP: NEGATIVE
HCT VFR BLD AUTO: 32.3 % (ref 34–46.6)
HDLC SERPL-MCNC: 59 MG/DL (ref 40–60)
HGB BLD-MCNC: 10.6 G/DL (ref 12–15.9)
HGB UR QL STRIP: NEGATIVE
IMM GRANULOCYTES # BLD AUTO: 0.07 10*3/MM3 (ref 0–0.05)
IMM GRANULOCYTES NFR BLD AUTO: 0.8 % (ref 0–0.5)
KETONES UR QL STRIP: ABNORMAL
LDLC SERPL CALC-MCNC: 121 MG/DL (ref 0–100)
LEUKOCYTE ESTERASE UR QL STRIP: ABNORMAL
LYMPHOCYTES # BLD AUTO: 2.39 10*3/MM3 (ref 0.7–3.1)
LYMPHOCYTES NFR BLD AUTO: 26.2 % (ref 19.6–45.3)
MCH RBC QN AUTO: 27.1 PG (ref 26.6–33)
MCHC RBC AUTO-ENTMCNC: 32.8 G/DL (ref 31.5–35.7)
MCV RBC AUTO: 82.6 FL (ref 79–97)
MICROALBUMIN UR-MCNC: 65.3 UG/ML
MONOCYTES # BLD AUTO: 1.77 10*3/MM3 (ref 0.1–0.9)
MONOCYTES NFR BLD AUTO: 19.4 % (ref 5–12)
NEUTROPHILS # BLD AUTO: 4.75 10*3/MM3 (ref 1.7–7)
NEUTROPHILS NFR BLD AUTO: 51.9 % (ref 42.7–76)
NITRITE UR QL STRIP: POSITIVE
NRBC BLD AUTO-RTO: 0 /100 WBC (ref 0–0.2)
PH UR STRIP: 6 [PH] (ref 5–8)
PLATELET # BLD AUTO: 159 10*3/MM3 (ref 140–450)
POTASSIUM SERPL-SCNC: 4.7 MMOL/L (ref 3.5–5.2)
PROT SERPL-MCNC: 6.8 G/DL (ref 6–8.5)
PROT UR QL STRIP: ABNORMAL
RBC # BLD AUTO: 3.91 10*6/MM3 (ref 3.77–5.28)
RBC #/AREA URNS HPF: ABNORMAL /HPF
SODIUM SERPL-SCNC: 139 MMOL/L (ref 136–145)
SP GR UR STRIP: 1.02 (ref 1–1.03)
T4 FREE SERPL-MCNC: 1.53 NG/DL (ref 0.93–1.7)
TRIGL SERPL-MCNC: 142 MG/DL (ref 0–150)
TSH SERPL DL<=0.005 MIU/L-ACNC: 4.51 UIU/ML (ref 0.27–4.2)
UROBILINOGEN UR STRIP-MCNC: ABNORMAL MG/DL
VLDLC SERPL CALC-MCNC: 25 MG/DL (ref 5–40)
WBC # BLD AUTO: 9.13 10*3/MM3 (ref 3.4–10.8)
WBC #/AREA URNS HPF: ABNORMAL /HPF

## 2023-02-13 ENCOUNTER — TELEPHONE (OUTPATIENT)
Dept: FAMILY MEDICINE CLINIC | Facility: CLINIC | Age: 83
End: 2023-02-13
Payer: MEDICARE

## 2023-02-13 DIAGNOSIS — N39.0 URINARY TRACT INFECTION WITHOUT HEMATURIA, SITE UNSPECIFIED: Primary | ICD-10-CM

## 2023-02-13 NOTE — TELEPHONE ENCOUNTER
Caller: Tejal Garg    Relationship to patient: Emergency Contact    Best call back number:    [phone]          Patient is needing: PATIENT'S DAUGHTER IS CALLING TO STATE DR RAMIREZ WANTED PATIENT TO GET A URINE CULTURE DONE.  SHE IS WANTING TO KNOW IF THERE IS AN ORDER FOR THE CULTURE AND IF SHE CAN BRING PATIENT IN TOMORROW.  SHE IS REQUESTING A CALL BACK TO SCHEDULE.    PLEASE ADVISE.

## 2023-02-17 LAB
BACTERIA UR CULT: ABNORMAL
OTHER ANTIBIOTIC SUSC ISLT: ABNORMAL

## 2023-02-21 ENCOUNTER — TELEPHONE (OUTPATIENT)
Dept: FAMILY MEDICINE CLINIC | Facility: CLINIC | Age: 83
End: 2023-02-21
Payer: MEDICARE

## 2023-02-21 NOTE — TELEPHONE ENCOUNTER
I called pt to get her scheduled an appointment per .  Pt did not answer.  I LM on pt vm to return call.        Hub okay to schedule pt appointment to follow up and discuss treatment.  Thanks

## 2023-02-22 RX ORDER — CIPROFLOXACIN 500 MG/1
500 TABLET, FILM COATED ORAL EVERY 12 HOURS SCHEDULED
Qty: 20 TABLET | Refills: 0 | Status: SHIPPED | OUTPATIENT
Start: 2023-02-22

## 2023-03-15 ENCOUNTER — OFFICE VISIT (OUTPATIENT)
Dept: FAMILY MEDICINE CLINIC | Facility: CLINIC | Age: 83
End: 2023-03-15
Payer: MEDICARE

## 2023-03-15 VITALS
HEART RATE: 94 BPM | OXYGEN SATURATION: 98 % | WEIGHT: 126 LBS | RESPIRATION RATE: 18 BRPM | BODY MASS INDEX: 23.19 KG/M2 | DIASTOLIC BLOOD PRESSURE: 68 MMHG | SYSTOLIC BLOOD PRESSURE: 124 MMHG | HEIGHT: 62 IN

## 2023-03-15 DIAGNOSIS — D64.9 ANEMIA OF UNKNOWN ETIOLOGY: ICD-10-CM

## 2023-03-15 DIAGNOSIS — Z09 HOSPITAL DISCHARGE FOLLOW-UP: Primary | ICD-10-CM

## 2023-03-15 DIAGNOSIS — J20.9 ACUTE BRONCHITIS, UNSPECIFIED ORGANISM: ICD-10-CM

## 2023-03-15 PROCEDURE — 3078F DIAST BP <80 MM HG: CPT | Performed by: FAMILY MEDICINE

## 2023-03-15 PROCEDURE — 99214 OFFICE O/P EST MOD 30 MIN: CPT | Performed by: FAMILY MEDICINE

## 2023-03-15 PROCEDURE — 3074F SYST BP LT 130 MM HG: CPT | Performed by: FAMILY MEDICINE

## 2023-03-15 RX ORDER — ALBUTEROL SULFATE 90 UG/1
AEROSOL, METERED RESPIRATORY (INHALATION)
COMMUNITY
Start: 2023-03-12

## 2023-03-15 RX ORDER — PREDNISONE 50 MG/1
1 TABLET ORAL DAILY
COMMUNITY
Start: 2023-03-12

## 2023-03-15 RX ORDER — IPRATROPIUM BROMIDE AND ALBUTEROL SULFATE 2.5; .5 MG/3ML; MG/3ML
3 SOLUTION RESPIRATORY (INHALATION)
COMMUNITY
Start: 2023-03-11

## 2023-03-15 NOTE — PROGRESS NOTES
"   Chief Complaint  Hospital Follow Up Visit (ER 3/11-bronchitis )    Sofia Delgado presents to [facility] for emergency room visit follow-up.  Patient went in with bronchitis on Saturday.  She was treated with steroids and breathing treatments.  She has continued 4 times a day breathing treatments since then.  She has improved greatly since Monday.  No fever sweats or chills reported.  Patient was not treated with antibiotics.  She did need follow-up on some of her labs due to mild anemia and platelet counts being abnormal.  Thankfully her chest x-ray did not show pneumonia.  Patient does have appointment with pulmonology next Wednesday.          Objective   Vital Signs:   Vitals:    03/15/23 1500   BP: 124/68   Pulse: 94   Resp: 18   SpO2: 98%   Weight: 57.2 kg (126 lb)   Height: 157.5 cm (62\")        BMI is within normal parameters. No other follow-up for BMI required.        Physical Exam  Vitals (some coughing in room, no distress) reviewed.   Constitutional:       General: She is not in acute distress.  Cardiovascular:      Rate and Rhythm: Normal rate and regular rhythm.      Heart sounds: Normal heart sounds.   Pulmonary:      Effort: Pulmonary effort is normal.      Breath sounds: Normal breath sounds.   Neurological:      General: No focal deficit present.      Mental Status: She is alert.   Psychiatric:         Attention and Perception: Attention normal.         Mood and Affect: Mood normal.         Behavior: Behavior normal.          Result Review :                Assessment and Plan    Diagnoses and all orders for this visit:    1. Hospital discharge follow-up (Primary)    2. Anemia of unknown etiology  Assessment & Plan:  Repeat labs tomorrow to recheck on anemia.    Orders:  -     CBC & Differential; Future  -     Vitamin B12; Future  -     Folate; Future  -     Methylmalonic Acid, Serum; Future  -     Iron Profile; Future    3. Acute bronchitis, unspecified " organism  Assessment & Plan:  Bronchitis improving.  Continue with current therapy.  Keep follow-up with pulmonologist.        Follow Up   Return in about 4 months (around 7/15/2023) for recheck/refill medication.  Patient was given instructions and counseling regarding her condition or for health maintenance advice. Please see specific information pulled into the AVS if appropriate.

## 2023-03-22 ENCOUNTER — TRANSCRIBE ORDERS (OUTPATIENT)
Dept: ADMINISTRATIVE | Facility: HOSPITAL | Age: 83
End: 2023-03-22
Payer: MEDICARE

## 2023-03-22 DIAGNOSIS — J84.10 PULMONARY FIBROSIS: Primary | ICD-10-CM

## 2023-04-06 ENCOUNTER — HOSPITAL ENCOUNTER (OUTPATIENT)
Dept: CT IMAGING | Facility: HOSPITAL | Age: 83
Discharge: HOME | End: 2023-04-06
Admitting: INTERNAL MEDICINE
Payer: MEDICARE

## 2023-04-06 DIAGNOSIS — J84.10 PULMONARY FIBROSIS: ICD-10-CM

## 2023-04-06 PROCEDURE — 71250 CT THORAX DX C-: CPT

## 2023-04-12 ENCOUNTER — TRANSCRIBE ORDERS (OUTPATIENT)
Dept: ADMINISTRATIVE | Facility: HOSPITAL | Age: 83
End: 2023-04-12
Payer: MEDICARE

## 2023-04-12 DIAGNOSIS — J92.9 PLEURAL THICKENING: ICD-10-CM

## 2023-04-12 DIAGNOSIS — J47.9 BRONCHIECTASIS WITHOUT COMPLICATION: Primary | ICD-10-CM

## 2023-08-02 ENCOUNTER — OFFICE VISIT (OUTPATIENT)
Dept: FAMILY MEDICINE CLINIC | Facility: CLINIC | Age: 83
End: 2023-08-02
Payer: MEDICARE

## 2023-08-02 VITALS
RESPIRATION RATE: 16 BRPM | HEIGHT: 62 IN | HEART RATE: 65 BPM | SYSTOLIC BLOOD PRESSURE: 125 MMHG | WEIGHT: 126 LBS | DIASTOLIC BLOOD PRESSURE: 73 MMHG | OXYGEN SATURATION: 97 % | BODY MASS INDEX: 23.19 KG/M2

## 2023-08-02 DIAGNOSIS — R53.82 CHRONIC FATIGUE: ICD-10-CM

## 2023-08-02 DIAGNOSIS — M54.50 ACUTE BILATERAL LOW BACK PAIN WITHOUT SCIATICA: ICD-10-CM

## 2023-08-02 DIAGNOSIS — R10.9 ABDOMINAL PAIN, UNSPECIFIED ABDOMINAL LOCATION: Primary | ICD-10-CM

## 2023-08-02 DIAGNOSIS — K59.04 CHRONIC IDIOPATHIC CONSTIPATION: ICD-10-CM

## 2023-08-02 DIAGNOSIS — R11.0 NAUSEA: ICD-10-CM

## 2023-08-02 LAB
BILIRUB BLD-MCNC: ABNORMAL MG/DL
EXPIRATION DATE: ABNORMAL
GLUCOSE UR STRIP-MCNC: NEGATIVE MG/DL
KETONES UR QL: ABNORMAL
LEUKOCYTE EST, POC: NEGATIVE
Lab: ABNORMAL
NITRITE UR-MCNC: NEGATIVE MG/ML
PH UR: 5.5 [PH] (ref 5–8)
PROT UR STRIP-MCNC: ABNORMAL MG/DL
RBC # UR STRIP: NEGATIVE /UL
SP GR UR: 1.03 (ref 1–1.03)
UROBILINOGEN UR QL: NORMAL

## 2023-08-02 PROCEDURE — 99214 OFFICE O/P EST MOD 30 MIN: CPT | Performed by: NURSE PRACTITIONER

## 2023-08-02 PROCEDURE — 3074F SYST BP LT 130 MM HG: CPT | Performed by: NURSE PRACTITIONER

## 2023-08-02 PROCEDURE — 81003 URINALYSIS AUTO W/O SCOPE: CPT | Performed by: NURSE PRACTITIONER

## 2023-08-02 PROCEDURE — 3078F DIAST BP <80 MM HG: CPT | Performed by: NURSE PRACTITIONER

## 2023-08-02 RX ORDER — PROMETHAZINE HYDROCHLORIDE 12.5 MG/1
12.5 TABLET ORAL EVERY 6 HOURS PRN
Qty: 30 TABLET | Refills: 1 | Status: SHIPPED | OUTPATIENT
Start: 2023-08-02 | End: 2023-08-12

## 2023-08-28 DIAGNOSIS — G45.9 TIA (TRANSIENT ISCHEMIC ATTACK): ICD-10-CM

## 2023-08-28 RX ORDER — CLOPIDOGREL BISULFATE 75 MG/1
TABLET ORAL
Qty: 90 TABLET | Refills: 1 | Status: SHIPPED | OUTPATIENT
Start: 2023-08-28

## 2023-10-11 ENCOUNTER — HOSPITAL ENCOUNTER (OUTPATIENT)
Dept: CT IMAGING | Facility: HOSPITAL | Age: 83
Discharge: HOME | End: 2023-10-11
Admitting: INTERNAL MEDICINE
Payer: MEDICARE

## 2023-10-11 DIAGNOSIS — J92.9 PLEURAL THICKENING: ICD-10-CM

## 2023-10-11 DIAGNOSIS — J47.9 BRONCHIECTASIS WITHOUT COMPLICATION: ICD-10-CM

## 2023-10-11 PROCEDURE — 71250 CT THORAX DX C-: CPT

## 2023-10-19 ENCOUNTER — TRANSCRIBE ORDERS (OUTPATIENT)
Dept: ADMINISTRATIVE | Facility: HOSPITAL | Age: 83
End: 2023-10-19
Payer: MEDICARE

## 2023-10-19 DIAGNOSIS — R91.1 LUNG NODULE: Primary | ICD-10-CM

## 2023-10-24 DIAGNOSIS — F33.42 RECURRENT MAJOR DEPRESSIVE DISORDER, IN FULL REMISSION: ICD-10-CM

## 2023-10-24 DIAGNOSIS — F43.21 GRIEF REACTION: ICD-10-CM

## 2023-10-24 RX ORDER — CITALOPRAM 20 MG/1
20 TABLET ORAL DAILY
Qty: 30 TABLET | Refills: 0 | Status: SHIPPED | OUTPATIENT
Start: 2023-10-24

## 2023-10-30 DIAGNOSIS — E78.00 PURE HYPERCHOLESTEROLEMIA: ICD-10-CM

## 2023-10-30 RX ORDER — ATORVASTATIN CALCIUM 10 MG/1
10 TABLET, FILM COATED ORAL NIGHTLY
Qty: 30 TABLET | Refills: 0 | Status: SHIPPED | OUTPATIENT
Start: 2023-10-30

## 2023-11-02 DIAGNOSIS — F33.42 RECURRENT MAJOR DEPRESSIVE DISORDER, IN FULL REMISSION: ICD-10-CM

## 2023-11-02 DIAGNOSIS — F43.21 GRIEF REACTION: ICD-10-CM

## 2023-11-02 RX ORDER — CITALOPRAM 20 MG/1
20 TABLET ORAL DAILY
Qty: 14 TABLET | Refills: 0 | Status: SHIPPED | OUTPATIENT
Start: 2023-11-02

## 2023-11-02 NOTE — TELEPHONE ENCOUNTER
Rx Refill Note  Requested Prescriptions     Pending Prescriptions Disp Refills    citalopram (CeleXA) 20 MG tablet [Pharmacy Med Name: Citalopram Hydrobromide Oral Tablet 20 MG] 90 tablet 0     Sig: TAKE 1 TABLET BY MOUTH EVERY DAY      Last office visit with prescribing clinician: 3/15/2023   Last telemedicine visit with prescribing clinician: Visit date not found   Next office visit with prescribing clinician: Visit date not found                         Would you like a call back once the refill request has been completed: [] Yes [] No    If the office needs to give you a call back, can they leave a voicemail: [] Yes [] No    Ouomu Grimm MA  11/02/23, 15:34 EDT

## 2023-11-10 DIAGNOSIS — E78.00 PURE HYPERCHOLESTEROLEMIA: ICD-10-CM

## 2023-11-10 RX ORDER — ATORVASTATIN CALCIUM 10 MG/1
10 TABLET, FILM COATED ORAL NIGHTLY
Qty: 14 TABLET | Refills: 0 | Status: SHIPPED | OUTPATIENT
Start: 2023-11-10

## 2023-11-10 NOTE — TELEPHONE ENCOUNTER
Rx Refill Note  Requested Prescriptions     Pending Prescriptions Disp Refills    atorvastatin (LIPITOR) 10 MG tablet [Pharmacy Med Name: Atorvastatin Calcium Oral Tablet 10 MG] 90 tablet 0     Sig: TAKE 1 TABLET BY MOUTH AT NIGHT AT BEDTIME      Last office visit with prescribing clinician: 3/15/2023   Last telemedicine visit with prescribing clinician: Visit date not found   Next office visit with prescribing clinician: Visit date not found                         Would you like a call back once the refill request has been completed: [] Yes [] No    If the office needs to give you a call back, can they leave a voicemail: [] Yes [] No    Oumou Grimm MA  11/10/23, 13:18 EST

## 2023-11-13 DIAGNOSIS — I10 ESSENTIAL HYPERTENSION: ICD-10-CM

## 2023-11-13 RX ORDER — OLMESARTAN MEDOXOMIL 40 MG/1
40 TABLET ORAL DAILY
Qty: 30 TABLET | Refills: 0 | Status: SHIPPED | OUTPATIENT
Start: 2023-11-13

## 2023-11-13 NOTE — TELEPHONE ENCOUNTER
Rx Refill Note  Requested Prescriptions     Pending Prescriptions Disp Refills    olmesartan (BENICAR) 40 MG tablet [Pharmacy Med Name: Olmesartan Medoxomil Oral Tablet 40 MG] 90 tablet 0     Sig: TAKE 1 TABLET BY MOUTH EVERY DAY      Last office visit with prescribing clinician: 3/15/2023   Last telemedicine visit with prescribing clinician: Visit date not found   Next office visit with prescribing clinician: Visit date not found                         Would you like a call back once the refill request has been completed: [] Yes [] No    If the office needs to give you a call back, can they leave a voicemail: [] Yes [] No    Oumou Grimm MA  11/13/23, 10:16 EST

## 2023-12-04 ENCOUNTER — TELEPHONE (OUTPATIENT)
Dept: FAMILY MEDICINE CLINIC | Facility: CLINIC | Age: 83
End: 2023-12-04
Payer: MEDICARE

## 2023-12-05 DIAGNOSIS — G45.9 TIA (TRANSIENT ISCHEMIC ATTACK): ICD-10-CM

## 2023-12-05 DIAGNOSIS — F43.21 GRIEF REACTION: ICD-10-CM

## 2023-12-05 DIAGNOSIS — E78.00 PURE HYPERCHOLESTEROLEMIA: ICD-10-CM

## 2023-12-05 DIAGNOSIS — F33.42 RECURRENT MAJOR DEPRESSIVE DISORDER, IN FULL REMISSION: ICD-10-CM

## 2023-12-05 DIAGNOSIS — I10 ESSENTIAL HYPERTENSION: ICD-10-CM

## 2023-12-05 RX ORDER — ATORVASTATIN CALCIUM 10 MG/1
10 TABLET, FILM COATED ORAL NIGHTLY
Qty: 90 TABLET | Refills: 0 | Status: SHIPPED | OUTPATIENT
Start: 2023-12-05

## 2023-12-05 RX ORDER — CLOPIDOGREL BISULFATE 75 MG/1
TABLET ORAL
Qty: 90 TABLET | Refills: 1 | Status: SHIPPED | OUTPATIENT
Start: 2023-12-05

## 2023-12-05 RX ORDER — OLMESARTAN MEDOXOMIL 40 MG/1
40 TABLET ORAL DAILY
Qty: 90 TABLET | Refills: 0 | Status: SHIPPED | OUTPATIENT
Start: 2023-12-05 | End: 2024-03-04

## 2023-12-05 RX ORDER — CITALOPRAM 20 MG/1
20 TABLET ORAL DAILY
Qty: 90 TABLET | Refills: 0 | Status: SHIPPED | OUTPATIENT
Start: 2023-12-05

## 2024-02-07 ENCOUNTER — OFFICE VISIT (OUTPATIENT)
Dept: FAMILY MEDICINE CLINIC | Facility: CLINIC | Age: 84
End: 2024-02-07
Payer: MEDICAID

## 2024-02-07 VITALS
HEART RATE: 79 BPM | WEIGHT: 130 LBS | OXYGEN SATURATION: 99 % | RESPIRATION RATE: 16 BRPM | SYSTOLIC BLOOD PRESSURE: 130 MMHG | BODY MASS INDEX: 23.92 KG/M2 | DIASTOLIC BLOOD PRESSURE: 90 MMHG | HEIGHT: 62 IN

## 2024-02-07 DIAGNOSIS — E78.00 PURE HYPERCHOLESTEROLEMIA: ICD-10-CM

## 2024-02-07 DIAGNOSIS — I10 ESSENTIAL HYPERTENSION: ICD-10-CM

## 2024-02-07 DIAGNOSIS — N30.00 ACUTE CYSTITIS WITHOUT HEMATURIA: Primary | ICD-10-CM

## 2024-02-07 DIAGNOSIS — F33.42 RECURRENT MAJOR DEPRESSIVE DISORDER, IN FULL REMISSION: ICD-10-CM

## 2024-02-07 DIAGNOSIS — F43.21 GRIEF REACTION: ICD-10-CM

## 2024-02-07 DIAGNOSIS — M54.50 ACUTE BILATERAL LOW BACK PAIN WITHOUT SCIATICA: ICD-10-CM

## 2024-02-07 DIAGNOSIS — R45.4 IRRITABILITY: ICD-10-CM

## 2024-02-07 DIAGNOSIS — G47.00 INSOMNIA, UNSPECIFIED TYPE: ICD-10-CM

## 2024-02-07 DIAGNOSIS — G45.9 TIA (TRANSIENT ISCHEMIC ATTACK): ICD-10-CM

## 2024-02-07 LAB
BILIRUB BLD-MCNC: NEGATIVE MG/DL
CLARITY, POC: CLEAR
COLOR UR: ABNORMAL
EXPIRATION DATE: ABNORMAL
GLUCOSE UR STRIP-MCNC: NEGATIVE MG/DL
KETONES UR QL: NEGATIVE
LEUKOCYTE EST, POC: ABNORMAL
Lab: ABNORMAL
NITRITE UR-MCNC: NEGATIVE MG/ML
PH UR: 6 [PH] (ref 5–8)
PROT UR STRIP-MCNC: NEGATIVE MG/DL
RBC # UR STRIP: NEGATIVE /UL
SP GR UR: 1.01 (ref 1–1.03)
UROBILINOGEN UR QL: ABNORMAL

## 2024-02-07 RX ORDER — CIPROFLOXACIN 500 MG/1
500 TABLET, FILM COATED ORAL 2 TIMES DAILY
Qty: 14 TABLET | Refills: 0 | Status: SHIPPED | OUTPATIENT
Start: 2024-02-07 | End: 2024-02-14

## 2024-02-07 RX ORDER — ALBUTEROL SULFATE 90 UG/1
2 AEROSOL, METERED RESPIRATORY (INHALATION)
Qty: 6.7 G | Refills: 2 | Status: SHIPPED | OUTPATIENT
Start: 2024-02-07 | End: 2024-02-07

## 2024-02-07 RX ORDER — CITALOPRAM 20 MG/1
20 TABLET ORAL DAILY
Qty: 90 TABLET | Refills: 0 | Status: SHIPPED | OUTPATIENT
Start: 2024-02-07

## 2024-02-07 RX ORDER — OLMESARTAN MEDOXOMIL 40 MG/1
40 TABLET ORAL DAILY
Qty: 90 TABLET | Refills: 0 | Status: SHIPPED | OUTPATIENT
Start: 2024-02-07 | End: 2024-05-07

## 2024-02-07 RX ORDER — DOCUSATE SODIUM 100 MG/1
200 CAPSULE, LIQUID FILLED ORAL NIGHTLY
Qty: 180 CAPSULE | Refills: 2 | Status: SHIPPED | OUTPATIENT
Start: 2024-02-07 | End: 2024-11-03

## 2024-02-07 RX ORDER — ATORVASTATIN CALCIUM 10 MG/1
10 TABLET, FILM COATED ORAL NIGHTLY
Qty: 90 TABLET | Refills: 0 | Status: SHIPPED | OUTPATIENT
Start: 2024-02-07

## 2024-02-07 RX ORDER — CLOPIDOGREL BISULFATE 75 MG/1
TABLET ORAL
Qty: 90 TABLET | Refills: 1 | Status: SHIPPED | OUTPATIENT
Start: 2024-02-07

## 2024-02-07 NOTE — PROGRESS NOTES
Chief Complaint  Back Pain (Lbp x 5 days, not sleeping well, irritable), Hypertension, Hyperlipidemia, and Anxiety    Subjective          Sandy presents to [facility] as an 83 year old female for follow up on chronic conditions, to refill medications, review/order labs and to discuss some acute back pain and irritability over the past week.     Hypertension-taking her blood pressure medication as directed.  Denies any medication side effects.  Denies any increase or changes in headaches no blurred vision no dizziness no flushing no chest pain or pressure.    Hyperlipidemia-taking atorvastatin as directed.  Denies any muscle weakness or myalgias  She is continue to work on a lower cholesterol diet and trying to remain active    Hypothyroid-taking levothyroxine.  Taking the a.m. 30 minutes separate from any other medication.  Do not miss doses.  She reports no changes in signs and symptoms    Anxiety/depression/ insomnia/grief-doing well.  Denies any SI or HI.  Taking medication as directed.  Has follow-up appointment scheduled with neuro    PHQ-2 Depression Screening  Little interest or pleasure in doing things?  0   Feeling down, depressed, or hopeless?  0   PHQ-2 Total Score  0         Back pain-increased back pain over the last 5 days.  Pain is worse with any bending lifting or twisting or trying to get comfortable in bed at night occasionally.  She denies any fever, chills, pain going down your legs,  loss of bladder or bowel function, weakness or numbness in arms or legs, nausea, vomiting, abdominal pain, or syncope.       Family reports that she has been more irritable and not sleeping well.  No increased frequency or urgency.  Denies any dysuria.  She denies any fever or chills  She has had UTIs in the past with similar symptoms      No other acute complaints at today    The following portions of the patient's history were reviewed and updated as appropriate: allergies, current  "medications, past family history, past medical history, past social history, past surgical history, and problem list          Review of Systems   Constitutional:  Positive for fatigue. Negative for chills and fever.   Eyes:  Negative for visual disturbance.   Respiratory:  Negative for cough, shortness of breath and wheezing.    Cardiovascular:  Negative for chest pain, palpitations and leg swelling.   Gastrointestinal:  Negative for abdominal pain, diarrhea, nausea and vomiting.   Endocrine: Negative for cold intolerance, heat intolerance, polydipsia, polyphagia and polyuria.   Genitourinary:  Positive for flank pain. Negative for dysuria, frequency and urgency.   Musculoskeletal:  Positive for back pain.   Skin:  Negative for rash.   Neurological:  Negative for dizziness and light-headedness.   Psychiatric/Behavioral:  Positive for sleep disturbance. Negative for self-injury and suicidal ideas. The patient is not nervous/anxious.         Objective   Vital Signs:   Vitals:    02/07/24 1446   BP: 130/90   Pulse: 79   Resp: 16   SpO2: 99%   Weight: 59 kg (130 lb)   Height: 157.5 cm (62\")        BMI is within normal parameters. No other follow-up for BMI required.        Physical Exam  Vitals reviewed.   Constitutional:       General: She is not in acute distress.  Eyes:      Conjunctiva/sclera: Conjunctivae normal.   Neck:      Thyroid: No thyromegaly.      Vascular: No carotid bruit.   Cardiovascular:      Rate and Rhythm: Normal rate and regular rhythm.      Heart sounds: Normal heart sounds.   Pulmonary:      Effort: Pulmonary effort is normal. No respiratory distress.      Breath sounds: Normal breath sounds. No stridor. No wheezing, rhonchi or rales.   Chest:      Chest wall: No tenderness.   Abdominal:      General: Abdomen is flat. There is no distension.      Palpations: Abdomen is soft. There is no mass.      Tenderness: There is no abdominal tenderness. There is no right CVA tenderness, left CVA tenderness, " guarding or rebound.      Hernia: No hernia is present.   Lymphadenopathy:      Cervical: No cervical adenopathy.   Neurological:      Mental Status: She is alert and oriented to person, place, and time.   Psychiatric:         Attention and Perception: Attention normal.         Mood and Affect: Mood normal.          Result Review :     The following data was reviewed by: PRABHU Pinon on 02/07/2024:  CBC & Differential (02/09/2023 09:55)  Comprehensive Metabolic Panel (02/09/2023 09:55)  Lipid Panel (02/09/2023 09:55)  CK (02/09/2023 09:55)  TSH (02/09/2023 09:55)  T4, Free (02/09/2023 09:55)    Cholesterol improved-  continue atorvastatin  Thyroid slightly elevated, but normal Free T4-  will keep at same dose if no symptoms-  recheck next visit     Mildly anemia-  history of in past-  no significant changes     Assessment and Plan    Diagnoses and all orders for this visit:    1. Acute cystitis without hematuria (Primary)  Comments:  Take medication as directed  Follow-up with any worsening or no improvement  Orders:  -     ciprofloxacin (Cipro) 500 MG tablet; Take 1 tablet by mouth 2 (Two) Times a Day for 7 days.  Dispense: 14 tablet; Refill: 0  -     Comprehensive Metabolic Panel  -     CBC & Differential  -     Lipid Panel  -     TSH  -     T4, Free  -     CK    2. Acute bilateral low back pain without sciatica  Comments:  Possible UTI sending for culture  Starting Cipro  Orders:  -     Urine Culture - Urine, Urine, Clean Catch  -     Cancel: POC Urinalysis Dipstick, Automated  -     POCT urinalysis dipstick, automated  -     Comprehensive Metabolic Panel  -     CBC & Differential  -     Lipid Panel  -     TSH  -     T4, Free  -     CK    3. Essential hypertension  Comments:  Continue current management monitor BP at home bring log to next visit  Orders:  -     olmesartan (BENICAR) 40 MG tablet; Take 1 tablet by mouth Daily for 90 days.  Dispense: 90 tablet; Refill: 0  -     Comprehensive Metabolic  Panel  -     CBC & Differential  -     Lipid Panel  -     TSH  -     T4, Free  -     CK    4. Pure hypercholesterolemia  Comments:  Repeat lipid panel with labs  Continue current management  Work on lower cholesterol diet and exercise as tolerated  Orders:  -     atorvastatin (LIPITOR) 10 MG tablet; Take 1 tablet by mouth Every Night.  Dispense: 90 tablet; Refill: 0  -     Comprehensive Metabolic Panel  -     CBC & Differential  -     Lipid Panel  -     TSH  -     T4, Free  -     CK    5. Irritability  Comments:  Sending urine for culture possible UTI  Orders:  -     Urine Culture - Urine, Urine, Clean Catch  -     Cancel: POC Urinalysis Dipstick, Automated  -     Comprehensive Metabolic Panel  -     CBC & Differential  -     Lipid Panel  -     TSH  -     T4, Free  -     CK    6. Insomnia, unspecified type  Comments:  Increased with back pain-treating UTI  Follow-up if no improvements  Orders:  -     Comprehensive Metabolic Panel  -     CBC & Differential  -     Lipid Panel  -     TSH  -     T4, Free  -     CK    7. Grief reaction  Comments:  Steven()  2016  Orders:  -     citalopram (CeleXA) 20 MG tablet; Take 1 tablet by mouth Daily.  Dispense: 90 tablet; Refill: 0  -     Comprehensive Metabolic Panel  -     CBC & Differential  -     Lipid Panel  -     TSH  -     T4, Free  -     CK    8. Recurrent major depressive disorder, in full remission  Comments:  Continue current management  Denies any SI or HI  Orders:  -     citalopram (CeleXA) 20 MG tablet; Take 1 tablet by mouth Daily.  Dispense: 90 tablet; Refill: 0  -     Comprehensive Metabolic Panel  -     CBC & Differential  -     Lipid Panel  -     TSH  -     T4, Free  -     CK    9. History of TIA (transient ischemic attack)  Comments:  Keep follow-up with specialist as directed continue current management  Orders:  -     clopidogrel (PLAVIX) 75 MG tablet; Take one po q Monday and Thursday  Dispense: 90 tablet; Refill: 1  -     Comprehensive  Metabolic Panel  -     CBC & Differential  -     Lipid Panel  -     TSH  -     T4, Free  -     CK    Other orders  -     Discontinue: albuterol sulfate  (90 Base) MCG/ACT inhaler; Inhale 2 puffs 4 (Four) Times a Day.  Dispense: 6.7 g; Refill: 2  -     docusate sodium (COLACE) 100 MG capsule; Take 2 capsules by mouth Every Night for 270 days.  Dispense: 180 capsule; Refill: 2        Follow Up   Return in about 6 months (around 8/7/2024) for Medicare Wellness.  Patient was given instructions and counseling regarding her condition or for health maintenance advice. Please see specific information pulled into the AVS if appropriate.

## 2024-02-11 LAB
BACTERIA UR CULT: ABNORMAL
BACTERIA UR CULT: ABNORMAL
OTHER ANTIBIOTIC SUSC ISLT: ABNORMAL

## 2024-02-13 ENCOUNTER — DOCUMENTATION (OUTPATIENT)
Dept: FAMILY MEDICINE CLINIC | Facility: CLINIC | Age: 84
End: 2024-02-13
Payer: MEDICARE

## 2024-04-10 ENCOUNTER — HOSPITAL ENCOUNTER (OUTPATIENT)
Dept: CT IMAGING | Facility: HOSPITAL | Age: 84
Discharge: HOME | End: 2024-04-10
Admitting: INTERNAL MEDICINE
Payer: MEDICARE

## 2024-04-10 DIAGNOSIS — R91.1 LUNG NODULE: ICD-10-CM

## 2024-04-10 PROCEDURE — 71250 CT THORAX DX C-: CPT

## 2024-04-19 ENCOUNTER — TRANSCRIBE ORDERS (OUTPATIENT)
Dept: ADMINISTRATIVE | Facility: HOSPITAL | Age: 84
End: 2024-04-19
Payer: MEDICARE

## 2024-04-19 DIAGNOSIS — R91.1 PULMONARY NODULE: Primary | ICD-10-CM

## 2024-05-08 LAB
ALBUMIN SERPL-MCNC: 4.5 G/DL (ref 3.5–5.2)
ALBUMIN/GLOB SERPL: 2 G/DL
ALP SERPL-CCNC: 63 U/L (ref 39–117)
ALT SERPL-CCNC: 12 U/L (ref 1–33)
AST SERPL-CCNC: 22 U/L (ref 1–32)
BASOPHILS # BLD AUTO: ABNORMAL 10*3/UL
BASOPHILS # BLD MANUAL: 0 10*3/MM3 (ref 0–0.2)
BASOPHILS NFR BLD MANUAL: 0 % (ref 0–1.5)
BILIRUB SERPL-MCNC: 0.3 MG/DL (ref 0–1.2)
BUN SERPL-MCNC: 26 MG/DL (ref 8–23)
BUN/CREAT SERPL: 24.1 (ref 7–25)
CALCIUM SERPL-MCNC: 9.7 MG/DL (ref 8.6–10.5)
CHLORIDE SERPL-SCNC: 105 MMOL/L (ref 98–107)
CHOLEST SERPL-MCNC: 178 MG/DL (ref 0–200)
CK SERPL-CCNC: 101 U/L (ref 20–180)
CO2 SERPL-SCNC: 26.1 MMOL/L (ref 22–29)
CREAT SERPL-MCNC: 1.08 MG/DL (ref 0.57–1)
DIFFERENTIAL COMMENT: ABNORMAL
EGFRCR SERPLBLD CKD-EPI 2021: 51.1 ML/MIN/1.73
EOSINOPHIL # BLD AUTO: ABNORMAL 10*3/UL
EOSINOPHIL # BLD MANUAL: 0.61 10*3/MM3 (ref 0–0.4)
EOSINOPHIL NFR BLD AUTO: ABNORMAL %
EOSINOPHIL NFR BLD MANUAL: 6 % (ref 0.3–6.2)
ERYTHROCYTE [DISTWIDTH] IN BLOOD BY AUTOMATED COUNT: 13.1 % (ref 12.3–15.4)
GLOBULIN SER CALC-MCNC: 2.3 GM/DL
GLUCOSE SERPL-MCNC: 102 MG/DL (ref 65–99)
HCT VFR BLD AUTO: 35.9 % (ref 34–46.6)
HDLC SERPL-MCNC: 61 MG/DL (ref 40–60)
HGB BLD-MCNC: 11.6 G/DL (ref 12–15.9)
LDLC SERPL CALC-MCNC: 97 MG/DL (ref 0–100)
LYMPHOCYTES # BLD AUTO: ABNORMAL 10*3/UL
LYMPHOCYTES # BLD MANUAL: 3.47 10*3/MM3 (ref 0.7–3.1)
LYMPHOCYTES NFR BLD AUTO: ABNORMAL %
LYMPHOCYTES NFR BLD MANUAL: 34 % (ref 19.6–45.3)
MCH RBC QN AUTO: 27.6 PG (ref 26.6–33)
MCHC RBC AUTO-ENTMCNC: 32.3 G/DL (ref 31.5–35.7)
MCV RBC AUTO: 85.3 FL (ref 79–97)
MONOCYTES # BLD MANUAL: 1.63 10*3/MM3 (ref 0.1–0.9)
MONOCYTES NFR BLD AUTO: ABNORMAL %
MONOCYTES NFR BLD MANUAL: 16 % (ref 5–12)
NEUTROPHILS # BLD MANUAL: 4.49 10*3/MM3 (ref 1.7–7)
NEUTROPHILS NFR BLD AUTO: ABNORMAL %
NEUTROPHILS NFR BLD MANUAL: 44 % (ref 42.7–76)
PLATELET # BLD AUTO: 96 10*3/MM3 (ref 140–450)
PLATELET BLD QL SMEAR: ABNORMAL
POTASSIUM SERPL-SCNC: 4.8 MMOL/L (ref 3.5–5.2)
PROT SERPL-MCNC: 6.8 G/DL (ref 6–8.5)
RBC # BLD AUTO: 4.21 10*6/MM3 (ref 3.77–5.28)
RBC MORPH BLD: ABNORMAL
SODIUM SERPL-SCNC: 143 MMOL/L (ref 136–145)
T4 FREE SERPL-MCNC: 1.59 NG/DL (ref 0.93–1.7)
TRIGL SERPL-MCNC: 112 MG/DL (ref 0–150)
TSH SERPL DL<=0.005 MIU/L-ACNC: 2.99 UIU/ML (ref 0.27–4.2)
VLDLC SERPL CALC-MCNC: 20 MG/DL (ref 5–40)
WBC # BLD AUTO: 10.2 10*3/MM3 (ref 3.4–10.8)

## 2024-07-20 DIAGNOSIS — F43.21 GRIEF REACTION: ICD-10-CM

## 2024-07-20 DIAGNOSIS — F33.42 RECURRENT MAJOR DEPRESSIVE DISORDER, IN FULL REMISSION: ICD-10-CM

## 2024-07-22 RX ORDER — CITALOPRAM 20 MG/1
20 TABLET ORAL DAILY
Qty: 30 TABLET | Refills: 0 | Status: SHIPPED | OUTPATIENT
Start: 2024-07-22

## 2024-08-14 DIAGNOSIS — N18.31 STAGE 3A CHRONIC KIDNEY DISEASE: ICD-10-CM

## 2024-08-14 DIAGNOSIS — E78.00 PURE HYPERCHOLESTEROLEMIA: ICD-10-CM

## 2024-08-14 DIAGNOSIS — I10 ESSENTIAL HYPERTENSION: Primary | ICD-10-CM

## 2024-08-14 DIAGNOSIS — E03.9 ACQUIRED HYPOTHYROIDISM: ICD-10-CM

## 2024-08-21 ENCOUNTER — OFFICE VISIT (OUTPATIENT)
Dept: FAMILY MEDICINE CLINIC | Facility: CLINIC | Age: 84
End: 2024-08-21
Payer: MEDICARE

## 2024-08-21 VITALS
WEIGHT: 134 LBS | HEART RATE: 64 BPM | OXYGEN SATURATION: 98 % | SYSTOLIC BLOOD PRESSURE: 122 MMHG | DIASTOLIC BLOOD PRESSURE: 80 MMHG | HEIGHT: 62 IN | BODY MASS INDEX: 24.66 KG/M2

## 2024-08-21 DIAGNOSIS — M41.27 OTHER IDIOPATHIC SCOLIOSIS, LUMBOSACRAL REGION: ICD-10-CM

## 2024-08-21 DIAGNOSIS — I10 ESSENTIAL HYPERTENSION: ICD-10-CM

## 2024-08-21 DIAGNOSIS — Z78.0 MENOPAUSE: ICD-10-CM

## 2024-08-21 DIAGNOSIS — E78.00 PURE HYPERCHOLESTEROLEMIA: ICD-10-CM

## 2024-08-21 DIAGNOSIS — F43.21 GRIEF REACTION: ICD-10-CM

## 2024-08-21 DIAGNOSIS — G45.9 TIA (TRANSIENT ISCHEMIC ATTACK): ICD-10-CM

## 2024-08-21 DIAGNOSIS — F33.42 RECURRENT MAJOR DEPRESSIVE DISORDER, IN FULL REMISSION: ICD-10-CM

## 2024-08-21 DIAGNOSIS — Z12.31 ENCOUNTER FOR SCREENING MAMMOGRAM FOR BREAST CANCER: ICD-10-CM

## 2024-08-21 DIAGNOSIS — Z23 IMMUNIZATION DUE: ICD-10-CM

## 2024-08-21 DIAGNOSIS — Z00.00 MEDICARE ANNUAL WELLNESS VISIT, SUBSEQUENT: Primary | ICD-10-CM

## 2024-08-21 DIAGNOSIS — Z13.820 ENCOUNTER FOR SCREENING FOR OSTEOPOROSIS: ICD-10-CM

## 2024-08-21 PROBLEM — N76.2 ACUTE VULVITIS: Status: RESOLVED | Noted: 2022-06-30 | Resolved: 2024-08-21

## 2024-08-21 PROBLEM — J20.9 ACUTE BRONCHITIS: Status: RESOLVED | Noted: 2023-03-15 | Resolved: 2024-08-21

## 2024-08-21 RX ORDER — ATORVASTATIN CALCIUM 10 MG/1
10 TABLET, FILM COATED ORAL NIGHTLY
Qty: 90 TABLET | Refills: 4 | Status: SHIPPED | OUTPATIENT
Start: 2024-08-21 | End: 2025-11-14

## 2024-08-21 RX ORDER — CITALOPRAM 20 MG/1
20 TABLET ORAL DAILY
Qty: 90 TABLET | Refills: 4 | Status: SHIPPED | OUTPATIENT
Start: 2024-08-21 | End: 2025-11-14

## 2024-08-21 RX ORDER — OLMESARTAN MEDOXOMIL 40 MG/1
40 TABLET ORAL DAILY
Qty: 90 TABLET | Refills: 4 | Status: SHIPPED | OUTPATIENT
Start: 2024-08-21 | End: 2025-11-14

## 2024-08-21 RX ORDER — DOCUSATE SODIUM 100 MG/1
200 CAPSULE, LIQUID FILLED ORAL NIGHTLY
Qty: 180 CAPSULE | Refills: 3 | Status: CANCELLED | OUTPATIENT
Start: 2024-08-21 | End: 2025-05-18

## 2024-08-21 RX ORDER — CLOPIDOGREL BISULFATE 75 MG/1
TABLET ORAL
Qty: 30 TABLET | Refills: 4 | Status: SHIPPED | OUTPATIENT
Start: 2024-08-21

## 2024-08-21 NOTE — ASSESSMENT & PLAN NOTE
Back pain due to scoliosis recently exacerbated by long car ride.  Referral to physical therapy.  If symptoms not resolved by physical therapy, and referral to back specialist

## 2024-08-21 NOTE — PROGRESS NOTES
Subjective   The ABCs of the Annual Wellness Visit  Medicare Wellness Visit      Sandy Varela is a 84 y.o. patient who presents for a Medicare Wellness Visit.    The following portions of the patient's history were reviewed and   updated as appropriate: allergies, current medications, past family history, past medical history, past social history, past surgical history, and problem list.    Compared to one year ago, the patient's physical   health is worse. Due to scoliosis  Compared to one year ago, the patient's mental   health is the same.    Recent Hospitalizations:  She was not admitted to the hospital during the last year.     Current Medical Providers:  Patient Care Team:  Anoop Appiah MD as PCP - General (Family Medicine)  Sandy Espinoza MD as Consulting Physician (Endocrinology)  Yang Pritchett MD as Surgeon (General Surgery)  iMsha Riley MD as Consulting Physician (Hematology)  Reena Dodson MD as Consulting Physician (Obstetrics and Gynecology)  Marv Hernandez MD as Consulting Physician (Neurology)  Nik Caceres MD as Consulting Physician (Pulmonary Disease)    Outpatient Medications Prior to Visit   Medication Sig Dispense Refill    acetaminophen (TYLENOL) 325 MG tablet Take 2 tablets by mouth Every 6 (Six) Hours As Needed for Mild Pain.      azelastine (ASTELIN) 0.1 % nasal spray INSTILL 2 SPRAYS IN EACH NOSTRIL TWO TIMES A DAY AS DIRECTED FOR 14 DAYS      Calcium Carb-Cholecalciferol (CALCIUM + D3 PO) Take  by mouth.      Calcium Polycarbophil (FIBER-CAPS PO) Take  by mouth.      cetirizine (zyrTEC) 5 MG tablet Take 2 tablets by mouth.      docusate sodium (COLACE) 100 MG capsule Take 2 capsules by mouth Every Night for 270 days. 180 capsule 2    donepezil (ARICEPT) 10 MG tablet Take 1 tablet by mouth Daily.      ipratropium-albuterol (DUO-NEB) 0.5-2.5 mg/3 ml nebulizer Inhale 3 mL.      levothyroxine (SYNTHROID, LEVOTHROID) 88 MCG tablet Take 1 tablet by mouth Daily. Daily  M-Sat and 1 1/2 tablets on Sun      memantine (NAMENDA) 10 MG tablet Take 1 tablet by mouth.      mirtazapine (REMERON) 15 MG tablet Take 1 tablet by mouth Every Night.      Multiple Vitamin (MULTIVITAMIN) tablet Take 1 tablet by mouth Daily.      atorvastatin (LIPITOR) 10 MG tablet Take 1 tablet by mouth Every Night. 90 tablet 0    citalopram (CeleXA) 20 MG tablet TAKE 1 TABLET BY MOUTH EVERY DAY 30 tablet 0    clopidogrel (PLAVIX) 75 MG tablet Take one po q Monday and Thursday 90 tablet 1    miconazole (MICOTIN) 2 % vaginal cream Insert 1 applicator into the vagina Every Night. 45 g 0    olmesartan (BENICAR) 40 MG tablet Take 1 tablet by mouth Daily for 90 days. 90 tablet 0     No facility-administered medications prior to visit.     No opioid medication identified on active medication list. I have reviewed chart for other potential  high risk medication/s and harmful drug interactions in the elderly.      Aspirin is not on active medication list.  Aspirin use is contraindicated for this patient due to: current use of clopidogrel.  .    Patient Active Problem List   Diagnosis    Grief reaction    Essential hypertension    Acquired hypothyroidism    Recurrent major depressive disorder, in full remission    Papillary thyroid carcinoma    Menopausal and perimenopausal disorder    Chronic idiopathic constipation    History of TIA (transient ischemic attack)    Gastric AVM    Mixed stress and urge urinary incontinence    Hearing decreased, bilateral    Mixed conductive and sensorineural hearing loss    Anemia of unknown etiology    Rotator cuff arthropathy    Stage 3a chronic kidney disease    Pure hypercholesterolemia    Other idiopathic scoliosis, lumbosacral region     Advance Care Planning Advance Directive is not on file.  ACP discussion was held with the patient during this visit. Patient has an advance directive (not in EMR), copy requested.            Objective   Vitals:    08/21/24 1403   BP: 122/80   Pulse:  "64   SpO2: 98%   Weight: 60.8 kg (134 lb)   Height: 157.5 cm (62\")       Estimated body mass index is 24.51 kg/m² as calculated from the following:    Height as of this encounter: 157.5 cm (62\").    Weight as of this encounter: 60.8 kg (134 lb).    BMI is within normal parameters. No other follow-up for BMI required.       Does the patient have evidence of cognitive impairment? Yes                                                                                                Health  Risk Assessment    Smoking Status:  Social History     Tobacco Use   Smoking Status Former    Current packs/day: 0.00    Average packs/day: 0.3 packs/day for 5.0 years (1.3 ttl pk-yrs)    Types: Cigarettes    Start date:     Quit date:     Years since quittin.6   Smokeless Tobacco Never     Alcohol Consumption:  Social History     Substance and Sexual Activity   Alcohol Use Yes    Comment: rare glass of wine       Fall Risk Screen  STEADI Fall Risk Assessment was completed, and patient is at LOW risk for falls.Assessment completed on:2024    Depression Screenin/21/2024     2:10 PM   PHQ-2/PHQ-9 Depression Screening   Little Interest or Pleasure in Doing Things 0-->not at all   Feeling Down, Depressed or Hopeless 0-->not at all   PHQ-9: Brief Depression Severity Measure Score 0     Health Habits and Functional and Cognitive Screenin/19/2024     7:02 PM   Functional & Cognitive Status   Do you have difficulty preparing food and eating? No   Do you have difficulty bathing yourself, getting dressed or grooming yourself? No   Do you have difficulty using the toilet? No   Do you have difficulty moving around from place to place? No   Do you have trouble with steps or getting out of a bed or a chair? No   Current Diet Limited Junk Food   Dental Exam Up to date   Eye Exam Up to date   Exercise (times per week) 3 times per week   Current Exercises Include Walking   Do you need help using the phone?  No   Are " you deaf or do you have serious difficulty hearing?  Yes   Do you need help to go to places out of walking distance? Yes   Do you need help shopping? No   Do you need help preparing meals?  No   Do you need help with housework?  No   Do you need help with laundry? No   Do you need help taking your medications? Yes   Do you need help managing money? Yes   Do you ever drive or ride in a car without wearing a seat belt? No   Have you felt unusual stress, anger or loneliness in the last month? No   Who do you live with? Alone   If you need help, do you have trouble finding someone available to you? No   Have you been bothered in the last four weeks by sexual problems? No   Do you have difficulty concentrating, remembering or making decisions? Yes           Age-appropriate Screening Schedule:  Refer to the list below for future screening recommendations based on patient's age, sex and/or medical conditions. Orders for these recommended tests are listed in the plan section. The patient has been provided with a written plan.    Health Maintenance List  Health Maintenance   Topic Date Due    RSV Vaccine - Adults (1 - 1-dose 60+ series) Never done    ZOSTER VACCINE (2 of 2) 06/26/2014    Pneumococcal Vaccine 65+ (2 of 2 - PCV) 04/10/2015    DXA SCAN  07/30/2021    MAMMOGRAM  11/25/2021    COLORECTAL CANCER SCREENING  07/26/2022    COVID-19 Vaccine (7 - 2023-24 season) 09/01/2023    ANNUAL WELLNESS VISIT  02/09/2024    INFLUENZA VACCINE  08/01/2024    LIPID PANEL  05/07/2025    TDAP/TD VACCINES (4 - Td or Tdap) 06/16/2026                                                                                                                                                CMS Preventative Services Quick Reference  Risk Factors Identified During Encounter  Immunizations Discussed/Encouraged: Influenza, Prevnar 20 (Pneumococcal 20-valent conjugate), Shingrix, COVID19, and RSV (Respiratory Syncytial Virus)  Inactivity/Sedentary: Patient  "was advised to exercise at least 150 minutes a week per CDC recommendations.    The above risks/problems have been discussed with the patient.  Pertinent information has been shared with the patient in the After Visit Summary.  An After Visit Summary and PPPS were made available to the patient.    Follow Up:   Next Medicare Wellness visit to be scheduled in 1 year.         Additional E&M Note during same encounter follows:  Patient has additional, significant, and separately identifiable condition(s)/problem(s) that require work above and beyond the Medicare Wellness Visit     Chief Complaint  Medicare Wellness-subsequent    Subjective    HPI  Sandy is also being seen today for additional medical problem/s.       The patient presents for a Medicare wellness visit. She is accompanied by her daughter Tejal.    She reports experiencing back discomfort without any recent injuries or falls. The pain extends across her back, under her ribs, and down to her hips, with tenderness upon touch. She also mentions a long car journey she undertook yesterday.    Her current medications include olmesartan, atorvastatin, citalopram, clopidogrel (taken twice weekly), and over-the-counter Colace. She is under the care of Dr. Hernandez, a neurologist, who has prescribed donepezil and Namenda. She also sees Dr. Espinoza for thyroid issues and Dr. Reena Dodson, an OB-GYN, annually. She has previously received iron infusions low hemoglobin levels. Additionally, she is under the care of Dr. Pritchett, a gastroenterologist, and Dr. Nik Kahn, a pulmonologist, for a cough.    She has not required any overnight hospital stays in the past year. She has a history of osteopenia and has not had any colonoscopies since turning 76.          Objective   Vital Signs:  /80   Pulse 64   Ht 157.5 cm (62\")   Wt 60.8 kg (134 lb)   SpO2 98%   BMI 24.51 kg/m²   Physical Exam  Vitals reviewed.   Constitutional:       General: She is not in acute " distress.  Eyes:      General: Lids are normal.      Conjunctiva/sclera: Conjunctivae normal.   Neck:      Vascular: No carotid bruit.      Trachea: No tracheal deviation.   Cardiovascular:      Rate and Rhythm: Normal rate and regular rhythm.      Heart sounds: Normal heart sounds. No murmur heard.  Pulmonary:      Effort: Pulmonary effort is normal.      Breath sounds: Normal breath sounds.   Musculoskeletal:      Comments: Thoracolumbar scoliosis   Skin:     General: Skin is warm and dry.   Neurological:      Mental Status: She is alert. She is not disoriented.   Psychiatric:         Speech: Speech normal.         Behavior: Behavior normal. Behavior is cooperative.                        Assessment and Plan                  Medicare annual wellness visit, subsequent  Immunization needs have been discussed she will receive her Prevnar 20 today.  We will ask her to consider getting Shingrix, RSV, COVID, influenza later this fall.  Essential hypertension   condition is stable. The current medical regimen is effective;  continue present plan and medications.  TIA (transient ischemic attack)  Condition is stable. The current medical regimen is effective;  continue present plan and medications.  Recurrent major depressive disorder, in full remission   condition is stable. The current medical regimen is effective;  continue present plan and medications.  Pure hypercholesterolemia    condition is stable. The current medical regimen is effective;  continue present plan and medications.  Other idiopathic scoliosis, lumbosacral region  Back pain due to scoliosis recently exacerbated by long car ride.  Referral to physical therapy.  If symptoms not resolved by physical therapy, and referral to back specialist  Encounter for screening for osteoporosis  DEXA scan ordered  Encounter for screening mammogram for breast cancer  Mammogram ordered  Immunization due  Prevnar 20 administered    Orders Placed This Encounter    Procedures    DEXA Bone Density, Axial (Hospital)     Standing Status:   Future     Standing Expiration Date:   8/21/2025     Order Specific Question:   Is patient taking or have taken long term Glucocorticoid (steroids)?     Answer:   No     Order Specific Question:   Does the patient have rheumatoid arthritis?     Answer:   No     Order Specific Question:   Does the patient have secondary osteoporosis?     Answer:   No     Order Specific Question:   Reason for Exam:     Answer:   screen for osteoporosis     Order Specific Question:   Release to patient     Answer:   Routine Release [[id number]]    Mammo Screening, Bilateral with CAD (Annually)     Use HCPCS/CPT Code 65303     Standing Status:   Future     Standing Expiration Date:   8/21/2025     Order Specific Question:   Reason for Exam:     Answer:   screen for breast cancer     Order Specific Question:   Release to patient     Answer:   Routine Release [[id number]]    Pneumococcal Conjugate Vaccine 20-Valent All    Ambulatory Referral to Physical Therapy for Evaluation & Treatment     Referral Priority:   Routine     Referral Type:   Physical Therapy     Referral Reason:   Specialty Services Required     Requested Specialty:   Physical Therapy     Number of Visits Requested:   1     New Medications Ordered This Visit   Medications    olmesartan (BENICAR) 40 MG tablet     Sig: Take 1 tablet by mouth Daily.     Dispense:  90 tablet     Refill:  4    clopidogrel (PLAVIX) 75 MG tablet     Sig: Take one po q Monday and Thursday     Dispense:  30 tablet     Refill:  4     Patient takes this twice weekly due to bruising in the past.    citalopram (CeleXA) 20 MG tablet     Sig: Take 1 tablet by mouth Daily.     Dispense:  90 tablet     Refill:  4    atorvastatin (LIPITOR) 10 MG tablet     Sig: Take 1 tablet by mouth Every Night.     Dispense:  90 tablet     Refill:  4          Follow Up   Return in about 53 weeks (around 8/27/2025) for Medicare Wellness & regular  visit, [id number] min.  Patient was given instructions and counseling regarding her condition or for health maintenance advice. Please see specific information pulled into the AVS if appropriate.  Patient or patient representative verbalized consent for the use of Ambient Listening during the visit with  Anoop Appiah MD for chart documentation. 8/21/2024  14:43 EDT

## 2024-08-21 NOTE — PATIENT INSTRUCTIONS
You should get an annual flu vaccination every fall. (This will help to protect you against viral influenza illness) Please  get the immunization at your local pharmacy or schedule at our office at your earliest convenience.  Please click on the link for more information about this vaccine.   https://www.cdc.gov/flu/prevent/flushot.htm      You are due for Shingrix vaccination series ( the newest shingles vaccine).  It is a two shot series spaced 2-6 months apart. Please get this vaccine series started at your earliest convenience at your local pharmacy to help avoid shingles outbreak. It is more effective than the old Zostavax vaccine and is recommended even if you have had the Zostavax vaccine in the past.  Once the Shingrix series is completed, it does not need to be repeated.   For more information, please look at the website below:  https://www.cdc.gov/vaccines/vpd/shingles/public/shingrix/index.html    You are due for RSV vaccination. (provides protection against Respiratory Syncytial Virus Infection) Please  get the immunization at your local pharmacy at your earliest convenience. This immunization is currently a one time vaccine only. Please click on the link for more information about this vaccine.   https://www.cdc.gov/rsv/vaccines/older-adults.html    You are due for a Covid 19 vaccination. (provides protection against Covid 19 Viral Infection) Please  talk to your pharmacist and get the immunization at your local pharmacy at your earliest convenience. Please click on the link for more information about this vaccine.   https://www.cdc.gov/coronavirus/2019-ncov/vaccines/stay-up-to-date.html        Medicare Wellness  Personal Prevention Plan of Service     Date of Office Visit:    Encounter Provider:  Anoop Appiah MD  Place of Service:  [facility]  Patient Name: Sandy Varela  :  1940    As part of the Medicare Wellness portion of your visit today, we are providing  you with this personalized preventive plan of services (PPPS). This plan is based upon recommendations of the [place] Preventive Services Task Force (USPSTF) and the Advisory Committee on Immunization Practices (ACIP).    This lists the preventive care services that should be considered, and provides dates of when you are due. Items listed as completed are up-to-date and do not require any further intervention.    Health Maintenance   Topic Date Due    RSV Vaccine - Adults (1 - 1-dose 60+ series) Never done    ZOSTER VACCINE (2 of 2) 06/26/2014    Pneumococcal Vaccine 65+ (2 of 2 - PCV) 04/10/2015    DXA SCAN  07/30/2021    MAMMOGRAM  11/25/2021    COLORECTAL CANCER SCREENING  07/26/2022    COVID-19 Vaccine (7 - 2023-24 season) 09/01/2023    INFLUENZA VACCINE  08/01/2024    LIPID PANEL  05/07/2025    ANNUAL WELLNESS VISIT  08/21/2025    TDAP/TD VACCINES (4 - Td or Tdap) 06/16/2026       Orders Placed This Encounter   Procedures    DEXA Bone Density, Axial (Hospital)     Standing Status:   Future     Standing Expiration Date:   8/21/2025     Order Specific Question:   Is patient taking or have taken long term Glucocorticoid (steroids)?     Answer:   No     Order Specific Question:   Does the patient have rheumatoid arthritis?     Answer:   No     Order Specific Question:   Does the patient have secondary osteoporosis?     Answer:   No     Order Specific Question:   Reason for Exam:     Answer:   screen for osteoporosis     Order Specific Question:   Release to patient     Answer:   Routine Release [[id number]]    Mammo Screening, Bilateral with CAD (Annually)     Use HCPCS/CPT Code 50924     Standing Status:   Future     Standing Expiration Date:   8/21/2025     Order Specific Question:   Reason for Exam:     Answer:   screen for breast cancer     Order Specific Question:   Release to patient     Answer:   Routine Release [[id number]]    Pneumococcal Conjugate Vaccine 20-Valent All    Ambulatory Referral to  Physical Therapy for Evaluation & Treatment     Referral Priority:   Routine     Referral Type:   Physical Therapy     Referral Reason:   Specialty Services Required     Requested Specialty:   Physical Therapy     Number of Visits Requested:   1       Return in about 53 weeks (around 8/27/2025) for Medicare Wellness & regular visit, [id number] min.

## 2024-08-22 ENCOUNTER — PATIENT MESSAGE (OUTPATIENT)
Dept: FAMILY MEDICINE CLINIC | Facility: CLINIC | Age: 84
End: 2024-08-22
Payer: COMMERCIAL

## 2024-08-22 DIAGNOSIS — M41.27 OTHER IDIOPATHIC SCOLIOSIS, LUMBOSACRAL REGION: ICD-10-CM

## 2024-08-22 DIAGNOSIS — D72.828 OTHER ELEVATED WHITE BLOOD CELL (WBC) COUNT: ICD-10-CM

## 2024-08-22 DIAGNOSIS — M54.50 ACUTE BILATERAL LOW BACK PAIN WITHOUT SCIATICA: Primary | ICD-10-CM

## 2024-08-22 DIAGNOSIS — R73.09 ELEVATED GLUCOSE LEVEL: ICD-10-CM

## 2024-08-26 NOTE — TELEPHONE ENCOUNTER
Peg Mejia MA 8/23/2024 7:46 AM EDT      ----- Message -----  From: Sandy Varela  Sent: 8/22/2024 5:59 PM EDT  To: [facility] Clinical Pool  Subject: physical therapy and labs     Good afternoon, this is Sandy's daughter Tejal. The physical therapy order-could that please be change to [facility], she has had therapy there several times, has been very pleased with their care and its [place]mins from her house and preferable. Scheduling at [facility] is 25-30 mins travel time each way and is difficult.    August 14- CMP and thyroid panel drawn with , available in EPIC, she was pleased with results. Mom's glucose was 109 non fasting. August 21 labs-Mom had a very uncomfortable morning with her back had not slept much and was very stressed, could that have raised her glucose? Can we repeat that lab.    The CBC Diff - concerns me for infection, after talking to mom, could you please check her urine, she has been prone to UTI.    We made an appointment next Wednesday to follow up with you.

## 2024-08-28 ENCOUNTER — TRANSCRIBE ORDERS (OUTPATIENT)
Dept: ADMINISTRATIVE | Facility: HOSPITAL | Age: 84
End: 2024-08-28
Payer: COMMERCIAL

## 2024-08-28 ENCOUNTER — OFFICE VISIT (OUTPATIENT)
Dept: FAMILY MEDICINE CLINIC | Facility: CLINIC | Age: 84
End: 2024-08-28
Payer: MEDICARE

## 2024-08-28 VITALS
HEIGHT: 62 IN | OXYGEN SATURATION: 98 % | DIASTOLIC BLOOD PRESSURE: 64 MMHG | SYSTOLIC BLOOD PRESSURE: 116 MMHG | BODY MASS INDEX: 24.48 KG/M2 | WEIGHT: 133 LBS | HEART RATE: 76 BPM

## 2024-08-28 DIAGNOSIS — N39.0 E. COLI UTI (URINARY TRACT INFECTION): Primary | ICD-10-CM

## 2024-08-28 DIAGNOSIS — R19.7 DIARRHEA OF PRESUMED INFECTIOUS ORIGIN: ICD-10-CM

## 2024-08-28 DIAGNOSIS — B96.20 E. COLI UTI (URINARY TRACT INFECTION): Primary | ICD-10-CM

## 2024-08-28 DIAGNOSIS — Z12.31 BREAST CANCER SCREENING BY MAMMOGRAM: Primary | ICD-10-CM

## 2024-08-28 PROCEDURE — 3078F DIAST BP <80 MM HG: CPT | Performed by: FAMILY MEDICINE

## 2024-08-28 PROCEDURE — 99213 OFFICE O/P EST LOW 20 MIN: CPT | Performed by: FAMILY MEDICINE

## 2024-08-28 PROCEDURE — G2211 COMPLEX E/M VISIT ADD ON: HCPCS | Performed by: FAMILY MEDICINE

## 2024-08-28 PROCEDURE — 3074F SYST BP LT 130 MM HG: CPT | Performed by: FAMILY MEDICINE

## 2024-08-28 PROCEDURE — 1126F AMNT PAIN NOTED NONE PRSNT: CPT | Performed by: FAMILY MEDICINE

## 2024-08-28 RX ORDER — CIPROFLOXACIN 500 MG/1
500 TABLET, FILM COATED ORAL 2 TIMES DAILY
COMMUNITY
Start: 2024-08-23 | End: 2024-09-06

## 2024-08-28 NOTE — Clinical Note
Efren, after the patient left I decided that she probably should send a stool sample to the lab for C. difficile.  Could you call the daughter and let her know and see if they could not get a stool sample over the [facility] to check that as soon as possible.  Thanks, Dr. Appiah

## 2024-08-28 NOTE — PROGRESS NOTES
"Chief Complaint  Follow-up (labs)    Subjective        Follow-up        The patient presents for evaluation of a urinary tract infection (UTI). She is accompanied by her daughter.    She sought immediate care last Friday due to abnormal lab results, a UTI, fever, body aches, and gastrointestinal issues. Her symptoms began with diarrhea on Thursday, which persisted for three days. The nurse practitioner at the immediate care center noted that her urine appeared highly abnormal. She was prescribed Cipro 500 mg for 14 days. A repeat urine test was conducted this morning at [facility], but there are concerns about potential contamination due to concurrent diarrhea and hemorrhoid bleeding. Her daughter reports that she had an E. coli infection with 100,000 colony-forming units.    She has been experiencing severe fatigue, requiring rest after being active for a few hours. She tends to consume more fluids in the morning and less at night. She also reports soreness under her ribs and back pain, although the latter has improved. She has been sleeping late, feeling refreshed for about 2 to 3 hours before needing a nap. This morning, she did not nap and feels exhausted after showering.   She takes Tylenol morning and night for her arthritis.           Vital Signs:   Vitals:    08/28/24 1151   BP: 116/64   Pulse: 76   SpO2: 98%   Weight: 60.3 kg (133 lb)   Height: 157.5 cm (62\")            8/21/2024     2:10 PM   PHQ-2/PHQ-9 Depression Screening   Little Interest or Pleasure in Doing Things 0-->not at all   Feeling Down, Depressed or Hopeless 0-->not at all   PHQ-9: Brief Depression Severity Measure Score 0       BMI is within normal parameters. No other follow-up for BMI required.        Physical Exam  Vitals reviewed.   Constitutional:       Appearance: She is ill-appearing.      Comments: Looks tired   Abdominal:      Palpations: Abdomen is soft.      Tenderness: There is abdominal tenderness.      Comments: Bilateral rib " areas                 The following data was reviewed by: Anoop Appiah MD on 08/28/2024:  Urine culture E Coli .    Results  Laboratory Studies  Urinalysis from August 23 showed a urinary tract infection. Urine culture showed E. coli infection with 100,000 colony-forming units.                Assessment and Plan     Orders Placed This Encounter   Procedures    Clostridioides difficile Toxin, PCR - Stool, Per Rectum           1. E. coli urinary tract infection.  Symptoms are gradually improving with the current treatment of ciprofloxacin 500 mg twice daily. The reported lightheadedness could be a side effect of the medication or a symptom of the infection itself. She is advised to increase her fluid intake and ensure adequate rest. A follow-up urine culture will be conducted at the end of the antibiotic course. If her fever returns or symptoms worsen, immediate medical attention should be sought.    Follow-up  The patient will follow up in 2 weeks.     Return in about 2 weeks (around 9/11/2024), or if symptoms worsen or fail to improve, for recheck of current condition.     Patient was given instructions and counseling regarding her condition or for health maintenance advice. Please see specific information pulled into the AVS if appropriate.     Patient or patient representative verbalized consent for the use of Ambient Listening during the visit with  Anoop Appiah MD for chart documentation. 8/28/2024  12:21 EDT  Answers submitted by the patient for this visit:  Other (Submitted on 8/28/2024)  Please describe your symptoms.: Follow up for Labs UTI and back pain  Have you had these symptoms before?: Yes  How long have you been having these symptoms?: 5-7 days  Primary Reason for Visit (Submitted on 8/28/2024)  What is the primary reason for your visit?: Other

## 2024-08-29 ENCOUNTER — TELEPHONE (OUTPATIENT)
Dept: FAMILY MEDICINE CLINIC | Facility: CLINIC | Age: 84
End: 2024-08-29
Payer: COMMERCIAL

## 2024-08-29 NOTE — TELEPHONE ENCOUNTER
Called pts daughter per Dr. Appiah, I stated he would like to test pt for c-diff, a stool sample needs to be collected and taken to [facility]. Order is in. Office number provided for callback/concerns.

## 2024-09-05 LAB — C DIFF TOX GENS STL QL NAA+PROBE: NEGATIVE

## 2024-09-19 ENCOUNTER — OFFICE VISIT (OUTPATIENT)
Dept: FAMILY MEDICINE CLINIC | Facility: CLINIC | Age: 84
End: 2024-09-19
Payer: MEDICARE

## 2024-09-19 VITALS
BODY MASS INDEX: 24.33 KG/M2 | HEIGHT: 62 IN | SYSTOLIC BLOOD PRESSURE: 120 MMHG | OXYGEN SATURATION: 98 % | HEART RATE: 66 BPM | DIASTOLIC BLOOD PRESSURE: 74 MMHG

## 2024-09-19 DIAGNOSIS — N39.0 URINARY TRACT INFECTION WITHOUT HEMATURIA, SITE UNSPECIFIED: Primary | ICD-10-CM

## 2024-09-19 LAB
BILIRUB BLD-MCNC: ABNORMAL MG/DL
CLARITY, POC: CLEAR
COLOR UR: YELLOW
EXPIRATION DATE: ABNORMAL
GLUCOSE UR STRIP-MCNC: NEGATIVE MG/DL
KETONES UR QL: ABNORMAL
LEUKOCYTE EST, POC: NEGATIVE
Lab: ABNORMAL
NITRITE UR-MCNC: NEGATIVE MG/ML
PH UR: 6 [PH] (ref 5–8)
PROT UR STRIP-MCNC: ABNORMAL MG/DL
RBC # UR STRIP: NEGATIVE /UL
SP GR UR: 1.03 (ref 1–1.03)
UROBILINOGEN UR QL: NORMAL

## 2024-09-19 PROCEDURE — 99212 OFFICE O/P EST SF 10 MIN: CPT | Performed by: FAMILY MEDICINE

## 2024-09-19 PROCEDURE — 3078F DIAST BP <80 MM HG: CPT | Performed by: FAMILY MEDICINE

## 2024-09-19 PROCEDURE — 81003 URINALYSIS AUTO W/O SCOPE: CPT | Performed by: FAMILY MEDICINE

## 2024-09-19 PROCEDURE — 3074F SYST BP LT 130 MM HG: CPT | Performed by: FAMILY MEDICINE

## 2024-09-19 PROCEDURE — 1126F AMNT PAIN NOTED NONE PRSNT: CPT | Performed by: FAMILY MEDICINE

## 2025-04-09 ENCOUNTER — HOSPITAL ENCOUNTER (OUTPATIENT)
Dept: CT IMAGING | Facility: HOSPITAL | Age: 85
Discharge: HOME | End: 2025-04-09
Admitting: INTERNAL MEDICINE
Payer: MEDICARE

## 2025-04-09 DIAGNOSIS — R91.1 PULMONARY NODULE: ICD-10-CM

## 2025-04-09 PROCEDURE — 71250 CT THORAX DX C-: CPT

## 2025-04-22 ENCOUNTER — OFFICE VISIT (OUTPATIENT)
Dept: FAMILY MEDICINE CLINIC | Facility: CLINIC | Age: 85
End: 2025-04-22
Payer: MEDICARE

## 2025-04-22 VITALS
DIASTOLIC BLOOD PRESSURE: 62 MMHG | HEART RATE: 72 BPM | SYSTOLIC BLOOD PRESSURE: 110 MMHG | WEIGHT: 135 LBS | OXYGEN SATURATION: 99 % | BODY MASS INDEX: 24.84 KG/M2 | HEIGHT: 62 IN

## 2025-04-22 DIAGNOSIS — F33.42 RECURRENT MAJOR DEPRESSIVE DISORDER, IN FULL REMISSION: ICD-10-CM

## 2025-04-22 DIAGNOSIS — G47.00 INSOMNIA, UNSPECIFIED TYPE: ICD-10-CM

## 2025-04-22 DIAGNOSIS — R53.83 OTHER FATIGUE: ICD-10-CM

## 2025-04-22 DIAGNOSIS — E03.9 ACQUIRED HYPOTHYROIDISM: ICD-10-CM

## 2025-04-22 DIAGNOSIS — F43.20 GRIEF REACTION: ICD-10-CM

## 2025-04-22 DIAGNOSIS — R45.4 IRRITABILITY: ICD-10-CM

## 2025-04-22 DIAGNOSIS — E78.00 PURE HYPERCHOLESTEROLEMIA: ICD-10-CM

## 2025-04-22 DIAGNOSIS — G45.9 TIA (TRANSIENT ISCHEMIC ATTACK): ICD-10-CM

## 2025-04-22 DIAGNOSIS — I10 ESSENTIAL HYPERTENSION: Primary | ICD-10-CM

## 2025-04-22 RX ORDER — BUDESONIDE, GLYCOPYRROLATE, AND FORMOTEROL FUMARATE 160; 9; 4.8 UG/1; UG/1; UG/1
2 AEROSOL, METERED RESPIRATORY (INHALATION)
COMMUNITY
Start: 2025-04-07

## 2025-04-22 RX ORDER — DOXYCYCLINE 100 MG/1
1 CAPSULE ORAL EVERY 12 HOURS SCHEDULED
COMMUNITY
Start: 2025-02-11

## 2025-04-22 RX ORDER — MONTELUKAST SODIUM 10 MG/1
10 TABLET ORAL EVERY EVENING
COMMUNITY
Start: 2025-04-16

## 2025-04-22 NOTE — ASSESSMENT & PLAN NOTE
Ongoing-  stable  Orders:    CK    T4, Free    TSH    Lipid Panel    CBC & Differential    Comprehensive Metabolic Panel

## 2025-04-22 NOTE — ASSESSMENT & PLAN NOTE
Keep follow up with neurology  Last appointment 4/10  Orders:    CK    T4, Free    TSH    Lipid Panel    CBC & Differential    Comprehensive Metabolic Panel

## 2025-04-22 NOTE — ASSESSMENT & PLAN NOTE
Continue levothyroxine  Due for repeat thyroid labs  Take medication in the a.m. 30 minutes separate from any other medication  Do not miss doses

## 2025-04-22 NOTE — ASSESSMENT & PLAN NOTE
Hold olmesartan  Monitor B/p at home bring log to next visit    Orders:    CK    T4, Free    TSH    Lipid Panel    CBC & Differential    Comprehensive Metabolic Panel

## 2025-04-22 NOTE — ASSESSMENT & PLAN NOTE
Patient's depression is bettera recurrent episode that is moderate without psychosis. Depression is active and stable.    Plan:   Continue current medication therapy     Followup in 6 months.     Orders:    CK    T4, Free    TSH    Lipid Panel    CBC & Differential    Comprehensive Metabolic Panel

## 2025-04-22 NOTE — PROGRESS NOTES
"Chief Complaint  Hypertension (Daughter feels her BP meds are too much, for 1-1.5hrs after taking, she gets pale and weak, BP at PT was 70/50s)    Subjective        HPI   Sandy Mckeon presents to [facility] as an 85 year old female to follow up on chronic conditions and to discuss increased fatigue, irritability and episodes of dizziness/ low blood pressure over the past couple of weeks.      Family is with her in office today   State she has had a couple of \"episodes\" during Sabian[employer] the last couple of weeks when she gets dizzy, fatigued , and hot and not feeling well suddenly  They did check her blood pressure twice and it was low both times  Last time it was 70s/50s  Daughter states she gets pale and weak about 1-1.5 hours after taking her medication  They did hold her medication today    She denies any fever or chills  Has occasional nausea, no vomiting, diarrhea  Has chronic constipation, but no changes  No dysuria or back pain changes  No increased frequency or urgency  Has had utis in the past-  was not able to void in office today      Has hypothyroidism-  takes levothyroxine 88 mg daily  Last labs 8/24-  tsh was slightly elevated  No changes per pcp to medication  She does take daily, but does not always take on an empty stomach or separate from other medication    She does see pulmonology  Did have an office visit with that office recently with adding montelukast-  started on Saturday.  Has chronic bronchiectasis and non productive cough/ no sob in office today  She sees Dr Hernandez for Alzheimer's last visit 4/10/2025  Takes Remeron for insomnia-  they continued Aricept and namenda  No other changes      She is agreeable to labs today  And family will return UA to labs when she is able to void    The following portions of the patient's history were reviewed and updated as appropriate: allergies, current medications, past family history, past medical history, past social history, " "past surgical history, and problem list       Review of Systems   Constitutional:  Positive for fatigue. Negative for chills, diaphoresis and fever.   HENT:  Positive for congestion. Negative for sore throat.    Respiratory:  Positive for cough.    Cardiovascular:  Negative for chest pain, palpitations and leg swelling.   Gastrointestinal:  Positive for nausea. Negative for abdominal pain, constipation, diarrhea, rectal pain and vomiting.   Genitourinary:  Negative for dysuria.   Musculoskeletal:  Negative for myalgias and neck pain.   Skin:  Negative for rash.   Neurological:  Positive for weakness. Negative for numbness.        Objective   Vital Signs:   Vitals:    04/22/25 1322   BP: 110/62   Pulse: 72   SpO2: 99%   Weight: 61.2 kg (135 lb)   Height: 157.5 cm (62\")            8/21/2024     2:10 PM   PHQ-2/PHQ-9 Depression Screening   Retired Little Interest or Pleasure in Doing Things 0-->not at all    Retired Feeling Down, Depressed or Hopeless 0-->not at all    Retired PHQ-9: Brief Depression Severity Measure Score 0        Data saved with a previous flowsheet row definition       BMI is within normal parameters. No other follow-up for BMI required.        Physical Exam  Vitals reviewed.   Constitutional:       General: She is not in acute distress.  Eyes:      Conjunctiva/sclera: Conjunctivae normal.   Neck:      Thyroid: No thyromegaly.      Vascular: No carotid bruit.   Cardiovascular:      Rate and Rhythm: Normal rate and regular rhythm.      Heart sounds: Normal heart sounds. No murmur heard.  Pulmonary:      Effort: Pulmonary effort is normal. No respiratory distress.      Breath sounds: Normal breath sounds. No stridor. No wheezing, rhonchi or rales.   Chest:      Chest wall: No tenderness.   Lymphadenopathy:      Cervical: No cervical adenopathy.   Neurological:      Mental Status: She is alert. Mental status is at baseline.   Psychiatric:         Attention and Perception: Attention normal.         Mood " and Affect: Mood normal.          Result Review :     The following data was reviewed by: PRABHU Pinon on 04/22/2025:      Basic Metabolic Panel (08/28/2024 08:37) glucose 104  gfr 46.9  CBC & Differential (08/28/2024 08:37)   H&H 10.6/33.2    Assessment and Plan       Essential hypertension  Hold olmesartan  Monitor B/p at home bring log to next visit    Orders:    CK    T4, Free    TSH    Lipid Panel    CBC & Differential    Comprehensive Metabolic Panel    Irritability  Checking labs  Orders:    CK    T4, Free    TSH    Lipid Panel    CBC & Differential    Comprehensive Metabolic Panel    Urinalysis With Culture If Indicated -    Insomnia, unspecified type  Recent add Remeron-  neurology  Orders:    CK    T4, Free    TSH    Lipid Panel    CBC & Differential    Comprehensive Metabolic Panel    Pure hypercholesterolemia   Lipid abnormalities are stable    Plan:  Continue same medication/s without change.      Discussed medication dosage, use, side effects, and goals of treatment in detail.    Counseled patient on lifestyle modifications to help control hyperlipidemia.     Patient Treatment Goals:   LDL goal is under 100    Followup in 6 months.    Orders:    CK    T4, Free    TSH    Lipid Panel    CBC & Differential    Comprehensive Metabolic Panel    Grief reaction  Ongoing-  stable  Orders:    CK    T4, Free    TSH    Lipid Panel    CBC & Differential    Comprehensive Metabolic Panel    Recurrent major depressive disorder, in full remission  Patient's depression is bettera recurrent episode that is moderate without psychosis. Depression is active and stable.    Plan:   Continue current medication therapy     Followup in 6 months.     Orders:    CK    T4, Free    TSH    Lipid Panel    CBC & Differential    Comprehensive Metabolic Panel    History of TIA (transient ischemic attack)  Keep follow up with neurology  Last appointment 4/10  Orders:    CK    T4, Free    TSH    Lipid Panel    CBC &  Differential    Comprehensive Metabolic Panel    Other fatigue  Checking labs    Orders:    Urinalysis With Culture If Indicated -    Acquired hypothyroidism  Continue levothyroxine  Due for repeat thyroid labs  Take medication in the a.m. 30 minutes separate from any other medication  Do not miss doses               Follow Up   Return in about 4 weeks (around 5/20/2025), or if symptoms worsen or fail to improve, for with Dr Appiah.  Patient was given instructions and counseling regarding her condition or for health maintenance advice. Please see specific information pulled into the AVS if appropriate.

## 2025-04-22 NOTE — ASSESSMENT & PLAN NOTE
Lipid abnormalities are stable    Plan:  Continue same medication/s without change.      Discussed medication dosage, use, side effects, and goals of treatment in detail.    Counseled patient on lifestyle modifications to help control hyperlipidemia.     Patient Treatment Goals:   LDL goal is under 100    Followup in 6 months.    Orders:    CK    T4, Free    TSH    Lipid Panel    CBC & Differential    Comprehensive Metabolic Panel

## 2025-04-23 ENCOUNTER — TELEPHONE (OUTPATIENT)
Dept: FAMILY MEDICINE CLINIC | Facility: CLINIC | Age: 85
End: 2025-04-23
Payer: COMMERCIAL

## 2025-04-23 ENCOUNTER — RESULTS FOLLOW-UP (OUTPATIENT)
Dept: FAMILY MEDICINE CLINIC | Facility: CLINIC | Age: 85
End: 2025-04-23
Payer: COMMERCIAL

## 2025-04-23 ENCOUNTER — TRANSCRIBE ORDERS (OUTPATIENT)
Dept: ADMINISTRATIVE | Facility: HOSPITAL | Age: 85
End: 2025-04-23
Payer: COMMERCIAL

## 2025-04-23 DIAGNOSIS — R91.1 LUNG NODULE: Primary | ICD-10-CM

## 2025-04-23 DIAGNOSIS — E03.9 ACQUIRED HYPOTHYROIDISM: Primary | ICD-10-CM

## 2025-04-23 LAB
ALBUMIN SERPL-MCNC: 3.9 G/DL (ref 3.5–5.2)
ALBUMIN/GLOB SERPL: 1.5 G/DL
ALP SERPL-CCNC: 67 U/L (ref 39–117)
ALT SERPL-CCNC: 18 U/L (ref 1–33)
AST SERPL-CCNC: 29 U/L (ref 1–32)
BASOPHILS # BLD AUTO: ABNORMAL 10*3/UL
BILIRUB SERPL-MCNC: 0.2 MG/DL (ref 0–1.2)
BUN SERPL-MCNC: 15 MG/DL (ref 8–23)
BUN/CREAT SERPL: 12.5 (ref 7–25)
CALCIUM SERPL-MCNC: 8.9 MG/DL (ref 8.6–10.5)
CHLORIDE SERPL-SCNC: 103 MMOL/L (ref 98–107)
CHOLEST SERPL-MCNC: 195 MG/DL (ref 0–200)
CK SERPL-CCNC: 145 U/L (ref 20–180)
CO2 SERPL-SCNC: 26.7 MMOL/L (ref 22–29)
CREAT SERPL-MCNC: 1.2 MG/DL (ref 0.57–1)
DIFFERENTIAL COMMENT: ABNORMAL
EGFRCR SERPLBLD CKD-EPI 2021: 44.7 ML/MIN/1.73
EOSINOPHIL # BLD AUTO: ABNORMAL 10*3/UL
EOSINOPHIL # BLD MANUAL: 0.09 10*3/MM3 (ref 0–0.4)
EOSINOPHIL NFR BLD AUTO: ABNORMAL %
EOSINOPHIL NFR BLD MANUAL: 1 % (ref 0.3–6.2)
ERYTHROCYTE [DISTWIDTH] IN BLOOD BY AUTOMATED COUNT: 13.3 % (ref 12.3–15.4)
GLOBULIN SER CALC-MCNC: 2.6 GM/DL
GLUCOSE SERPL-MCNC: 107 MG/DL (ref 65–99)
GLUCOSE UR QL STRIP: NORMAL
HCT VFR BLD AUTO: 32.8 % (ref 34–46.6)
HDLC SERPL-MCNC: 69 MG/DL (ref 40–60)
HGB BLD-MCNC: 10.7 G/DL (ref 12–15.9)
KETONES UR QL STRIP: NORMAL
LDLC SERPL CALC-MCNC: 105 MG/DL (ref 0–100)
LYMPHOCYTES # BLD AUTO: ABNORMAL 10*3/UL
LYMPHOCYTES # BLD MANUAL: 2.65 10*3/MM3 (ref 0.7–3.1)
LYMPHOCYTES NFR BLD AUTO: ABNORMAL %
LYMPHOCYTES NFR BLD MANUAL: 28 % (ref 19.6–45.3)
MCH RBC QN AUTO: 27.9 PG (ref 26.6–33)
MCHC RBC AUTO-ENTMCNC: 32.6 G/DL (ref 31.5–35.7)
MCV RBC AUTO: 85.6 FL (ref 79–97)
MONOCYTES # BLD MANUAL: 1.32 10*3/MM3 (ref 0.1–0.9)
MONOCYTES NFR BLD AUTO: ABNORMAL %
MONOCYTES NFR BLD MANUAL: 14 % (ref 5–12)
NEUTROPHILS # BLD MANUAL: 5.39 10*3/MM3 (ref 1.7–7)
NEUTROPHILS NFR BLD AUTO: ABNORMAL %
NEUTROPHILS NFR BLD MANUAL: 57 % (ref 42.7–76)
PH UR STRIP: NORMAL [PH]
PLATELET # BLD AUTO: 131 10*3/MM3 (ref 140–450)
PLATELET BLD QL SMEAR: ABNORMAL
POTASSIUM SERPL-SCNC: 4.7 MMOL/L (ref 3.5–5.2)
PROT SERPL-MCNC: 6.5 G/DL (ref 6–8.5)
PROT UR QL STRIP: NORMAL
RBC # BLD AUTO: 3.83 10*6/MM3 (ref 3.77–5.28)
RBC MORPH BLD: ABNORMAL
SODIUM SERPL-SCNC: 140 MMOL/L (ref 136–145)
SP GR UR STRIP: NORMAL
SPECIMEN STATUS: NORMAL
T4 FREE SERPL-MCNC: 1.22 NG/DL (ref 0.92–1.68)
TRIGL SERPL-MCNC: 118 MG/DL (ref 0–150)
TSH SERPL DL<=0.005 MIU/L-ACNC: 28.6 UIU/ML (ref 0.27–4.2)
VLDLC SERPL CALC-MCNC: 21 MG/DL (ref 5–40)
WBC # BLD AUTO: 9.45 10*3/MM3 (ref 3.4–10.8)

## 2025-04-23 RX ORDER — LEVOTHYROXINE SODIUM 100 UG/1
100 TABLET ORAL
Qty: 90 TABLET | Refills: 1 | Status: SHIPPED | OUTPATIENT
Start: 2025-04-23

## 2025-04-23 NOTE — TELEPHONE ENCOUNTER
Spoke with [facility] to inform them per patient chart Urine culture was ordered. Verbalized understanding and had no further question

## 2025-04-23 NOTE — LETTER
Sandy Varela  [address] [facility] [address]    April 30, 2025     Dear Ms. Varela:    Below are the results from your recent visit:    Resulted Orders   CK   Result Value Ref Range    Creatine Kinase 145 20 - 180 U/L   T4, Free   Result Value Ref Range    Free T4 1.22 0.92 - 1.68 ng/dL   TSH   Result Value Ref Range    TSH 28.600 (H) 0.270 - 4.200 uIU/mL   Lipid Panel   Result Value Ref Range    Total Cholesterol 195 0 - 200 mg/dL      Comment:      Cholesterol Reference Ranges  (U.S. Department of Health and Human Services ATP III  Classifications)  Desirable          <200 mg/dL  Borderline High    200-239 mg/dL  High Risk          >240 mg/dL  Triglyceride Reference Ranges  (U.S. Department of Health and Human Services ATP III  Classifications)  Normal           <150 mg/dL  Borderline High  150-199 mg/dL  High             200-499 mg/dL  Very High        >500 mg/dL  HDL Reference Ranges  (U.S. Department of Health and Human Services ATP III  Classifications)  Low     <40 mg/dl (major risk factor for CHD)  High    >60 mg/dl ('negative' risk factor for CHD)  LDL Reference Ranges  (U.S. Department of Health and Human Services ATP III  Classifications)  Optimal          <100 mg/dL  Near Optimal     100-129 mg/dL  Borderline High  130-159 mg/dL  High             160-189 mg/dL  Very High        >189 mg/dL  LDL is calculated using the NIH LDL-C calculation.      Triglycerides 118 0 - 150 mg/dL    HDL Cholesterol 69 (H) 40 - 60 mg/dL    VLDL Cholesterol [place] 21 5 - 40 mg/dL    LDL Chol Calc (NIH) 105 (H) 0 - 100 mg/dL   CBC & Differential   Result Value Ref Range    WBC 9.45 3.40 - 10.80 10*3/mm3    RBC 3.83 3.77 - 5.28 10*6/mm3    Hemoglobin 10.7 (L) 12.0 - 15.9 g/dL    Hematocrit 32.8 (L) 34.0 - 46.6 %    MCV 85.6 79.0 - 97.0 fL    MCH 27.9 26.6 - 33.0 pg    MCHC 32.6 31.5 - 35.7 g/dL    RDW 13.3 12.3 - 15.4 %    Platelets 131 (L) 140 - 450 10*3/mm3    Neutrophil Rel % CANCELED       Comment:      Test not  performed    Result canceled by the ancillary.      Lymphocyte Rel % CANCELED       Comment:      Test not performed    Result canceled by the ancillary.      Monocyte Rel % CANCELED       Comment:      Test not performed    Result canceled by the ancillary.      Eosinophil Rel % CANCELED       Comment:      Test not performed    Result canceled by the ancillary.      Lymphocytes Absolute CANCELED       Comment:      Test not performed    Result canceled by the ancillary.      Eosinophils Absolute CANCELED       Comment:      Test not performed    Result canceled by the ancillary.      Basophils Absolute CANCELED       Comment:      Test not performed    Result canceled by the ancillary.     Comprehensive Metabolic Panel   Result Value Ref Range    Glucose 107 (H) 65 - 99 mg/dL    BUN 15 8 - 23 mg/dL    Creatinine 1.20 (H) 0.57 - 1.00 mg/dL    EGFR Result 44.7 (L) >60.0 mL/min/1.73      Comment:      GFR Categories in Chronic Kidney Disease (CKD)/X09/  /X09/  GFR Category          GFR (mL/min/1.73)    Interpretation  G1/X09/                    90 or greater/X09/        Normal or high  (1)  G2//                    60-89                Mild decrease  (1)  G3a                   45-59                Mild to moderate  decrease  G3b                   30-44                Moderate to  severe decrease  G4                    15-29                Severe decrease  G5                    14 or less           Kidney failure//  /[id number]/  (1)In the absence of evidence of kidney disease, neither  GFR category G1 or G2 fulfill the criteria for CKD.  eGFR calculation 2021 CKD-EPI creatinine equation, which  does not include race as a factor      BUN/Creatinine Ratio 12.5 7.0 - 25.0    Sodium 140 136 - 145 mmol/L    Potassium 4.7 3.5 - 5.2 mmol/L    Chloride 103 98 - 107 mmol/L    Total CO2 26.7 22.0 - 29.0 mmol/L    Calcium 8.9 8.6 - 10.5 mg/dL    Total Protein 6.5 6.0 - 8.5 g/dL    Albumin 3.9 3.5 - 5.2 g/dL    Globulin  2.6 gm/dL    A/G Ratio 1.5 g/dL    Total Bilirubin 0.2 0.0 - 1.2 mg/dL    Alkaline Phosphatase 67 39 - 117 U/L    AST (SGOT) 29 1 - 32 U/L    ALT (SGPT) 18 1 - 33 U/L   Manual Differential   Result Value Ref Range    Neutrophil Rel % 57.0 42.7 - 76.0 %    Lymphocyte Rel % 28.0 19.6 - 45.3 %    Monocyte Rel % 14.0 (H) 5.0 - 12.0 %    Eosinophil Rel % 1.0 0.3 - 6.2 %    Neutrophils Absolute 5.39 1.70 - 7.00 10*3/mm3    Lymphocytes Absolute 2.65 0.70 - 3.10 10*3/mm3    Monocytes Absolute 1.32 (H) 0.10 - 0.90 10*3/mm3    Eosinophil Abs 0.09 0.00 - 0.40 10*3/mm3    Differential Comment Comment       Comment:      WBC MORPHOLOGY               Normal                      Normal     N    Comment Comment       Comment:      ANISOCYTOSIS                 Slight/1+                   None Seen  N  MICROCYTES                   Slight/1+                   None Seen  N      Plt Comment Comment       Comment:      PLATELET MORPHOLOGY          Normal                      Normal     N   Urinalysis With Culture If Indicated -   Result Value Ref Range    Specific Gravity, UA CANCELED       Comment:      Test not performed. Gray top urine tube is for urine culture and  is not suitable for urinalysis.  CONTACTED CORINNE T-PSR ON 04/23/2025    Result canceled by the ancillary.      pH, UA CANCELED       Comment:      Test not performed    Result canceled by the ancillary.      Protein CANCELED       Comment:      Test not performed    Result canceled by the ancillary.      Glucose, UA CANCELED       Comment:      Test not performed    Result canceled by the ancillary.      Ketones CANCELED       Comment:      Test not performed  **Effective May 5, 2025, UA/M w/rflx Culture, Comp ([id number]) will be**    made non-orderable. This will affect any profile containing [id number].    [facility] offers DOS test: [id number] UA/M w/rflx Culture, Routine.    Result canceled by the ancillary.     Specimen Status Report   Result Value Ref Range    Specimen Status  CANCELED       Comment:      Test not performed. Gray top urine tube is for urine culture and  is not suitable for urinalysis.        TEST:  [id number]  UA/M w/rflx Culture, Comp  CONTACTED CORINNE T-PSR ON 04/23/2025    Result canceled by the ancillary.     Urine Culture - ,   Result Value Ref Range    Urine Culture Final report (A)     Result 1 Escherichia coli (A)       Comment:      Greater than 100,000 colony forming units per mL  Cefazolin with an PETER <=16 predicts susceptibility to the oral agents  cefaclor, cefdinir, cefpodoxime, cefprozil, cefuroxime, cephalexin,  and loracarbef when used for therapy of uncomplicated urinary tract  infections due to E. coli, Klebsiella pneumoniae, and Proteus  mirabilis.      Susceptibility Testing Comment       Comment:            ** S = Susceptible; I = Intermediate; R = Resistant **                     P = Positive; N = Negative              MICS are expressed in micrograms per mL     Antibiotic                 RSLT#1    RSLT#2    RSLT#3    RSLT#4  Amoxicillin/Clavulanic Acid    S  Ampicillin                     S  Cefazolin                      S  Cefepime                       S  Cefoxitin                      S  Cefpodoxime                    S  Ceftriaxone                    S  Ciprofloxacin                  S  Ertapenem                      S  Gentamicin                     S  Levofloxacin                   S  Meropenem                      S  Nitrofurantoin                 S  Piperacillin/Tazobactam        S  Tetracycline                   S  Tobramycin                     S  Trimethoprim/Sulfa             S     Written Authorization   Result Value Ref Range    Written Authorization Comment       Comment:      Written Authorization Received.  Authorization received from HUA MORRISON LPN 04-  Logged by Nellie Beaver         Urine culture came back with E coli  Will send antibiotic to the pharmacy           If you have any questions or concerns, please don't  hesitate to call.         Sincerely,        PRABHU Pinon

## 2025-04-23 NOTE — PROGRESS NOTES
Please call and inform patient that her thyroid level is very elevated and we need to increase her dose. This is probably causing a lot of her symptoms, especially fatigue. She needs to take it in the morning at least 30 minutes separate for any other medication or food and we need to repeat the labs in six weeks.

## 2025-04-23 NOTE — TELEPHONE ENCOUNTER
[facility] called for clarification on patient's labs. She stated they received an order for a urinalysis, but the sample was sent in a Urine culture container and would not be enough specimen for a urinalysis. Please call [phone], [phone]: [phone] for verbal.

## 2025-04-28 LAB
BACTERIA UR CULT: ABNORMAL
BACTERIA UR CULT: ABNORMAL
OTHER ANTIBIOTIC SUSC ISLT: ABNORMAL
WRITTEN AUTHORIZATION: NORMAL

## 2025-04-29 RX ORDER — CIPROFLOXACIN 500 MG/1
500 TABLET, FILM COATED ORAL 2 TIMES DAILY
Qty: 10 TABLET | Refills: 0 | Status: SHIPPED | OUTPATIENT
Start: 2025-04-29 | End: 2025-05-04

## 2025-06-02 ENCOUNTER — OFFICE VISIT (OUTPATIENT)
Dept: FAMILY MEDICINE CLINIC | Facility: CLINIC | Age: 85
End: 2025-06-02
Payer: MEDICARE

## 2025-06-02 VITALS
BODY MASS INDEX: 24.84 KG/M2 | WEIGHT: 135 LBS | DIASTOLIC BLOOD PRESSURE: 68 MMHG | HEART RATE: 58 BPM | SYSTOLIC BLOOD PRESSURE: 124 MMHG | OXYGEN SATURATION: 97 % | HEIGHT: 62 IN

## 2025-06-02 DIAGNOSIS — I10 ESSENTIAL HYPERTENSION: Primary | ICD-10-CM

## 2025-06-02 PROCEDURE — 1126F AMNT PAIN NOTED NONE PRSNT: CPT | Performed by: FAMILY MEDICINE

## 2025-06-02 PROCEDURE — G2211 COMPLEX E/M VISIT ADD ON: HCPCS | Performed by: FAMILY MEDICINE

## 2025-06-02 PROCEDURE — 3074F SYST BP LT 130 MM HG: CPT | Performed by: FAMILY MEDICINE

## 2025-06-02 PROCEDURE — 3078F DIAST BP <80 MM HG: CPT | Performed by: FAMILY MEDICINE

## 2025-06-02 PROCEDURE — 99213 OFFICE O/P EST LOW 20 MIN: CPT | Performed by: FAMILY MEDICINE

## 2025-06-02 RX ORDER — FLUTICASONE PROPIONATE 50 MCG
2 SPRAY, SUSPENSION (ML) NASAL DAILY
COMMUNITY

## 2025-06-02 NOTE — PROGRESS NOTES
"Chief Complaint  Follow-up (4 week) and Hypertension    Subjective        HPI      The patient presents for evaluation of essential hypertension, postnasal drip, and skin discoloration.    She had a consultation with Maxine 4 weeks ago due to hypotensive episodes, leading to the discontinuation of her antihypertensive medication. Currently, she is not on any blood pressure medications. Her blood pressure remains within normal limits, and she reports improved well-being.    She has persistent darkening of her hands, cause unknown.    She has been on mirtazapine. The pulmonologist prescribed Singulair and Flonase, which have significantly helped. Maxine increased her levothyroxine dose to 100 mcg. She takes clopidogrel twice a week at a low dose.    MEDICATIONS  Current: Mirtazapine, Singulair, Flonase, levothyroxine, clopidogrel           Vital Signs:   Vitals:    06/02/25 1447   BP: 124/68   Pulse: 58   SpO2: 97%   Weight: 61.2 kg (135 lb)   Height: 157.5 cm (62\")            6/2/2025     2:52 PM   PHQ-2/PHQ-9 Depression Screening   Little interest or pleasure in doing things Not at all   Feeling down, depressed, or hopeless Not at all       BMI is within normal parameters. No other follow-up for BMI required.        Physical Exam     General Appearance: Normal appearance, No acute distress.  Vital signs: Blood pressure normal.  Respiratory: No respiratory distress, lungs clear to auscultation with no wheezes or rubs.  Cardiovascular: regular rate and rhythm with no murmurs, rubs, or gallop.  Genitourinary: Female.  Extremities: no pretibial edema bilaterally.  Psychiatric: normal affect, pleasant, cooperative with exam.  Other observations: no carotid bruits auscultated bilaterally.   Skin showing some bruising of wrists and hands, due to thinning of skin          Results                  Assessment and Plan      Essential hypertension.  Well-managed without medication. Advised to maintain a Mediterranean diet and " monitor blood pressure. Follow-up on 08/27/2025.    Postnasal drip.  Singulair and Flonase have significantly improved symptoms. Continue as directed.    Skin discoloration.  Dark spots on hands likely due to capillary bleeding under the skin . No specific treatment required.    Medication management.  Currently on mirtazapine and levothyroxine 100 mcg. Previous levothyroxine dose (88 mcg) discontinued. Also on clopidogrel at a low dose twice a week.         Essential hypertension    Essential hypertension is controlled without medication.  Continue with heart healthy diet.  Continue to monitor blood pressure.  Keep follow-up visit as scheduled August 27, 2025          Return in 3 months (on 8/27/2025) for next scheduled follow up.     Patient was given instructions and counseling regarding her condition or for health maintenance advice. Please see specific information pulled into the AVS if appropriate.     Patient or patient representative verbalized consent for the use of Ambient Listening during the visit with  Anoop Appiah MD for chart documentation. 6/2/2025  15:06 EDT

## 2025-06-02 NOTE — ASSESSMENT & PLAN NOTE
Essential hypertension is controlled without medication.  Continue with heart healthy diet.  Continue to monitor blood pressure.  Keep follow-up visit as scheduled August 27, 2025

## 2025-06-02 NOTE — PATIENT INSTRUCTIONS
Mediterranean Diet  A Mediterranean diet is based on the traditions of countries on the [place]. It focuses on eating more:  Fruits and vegetables.  Whole grains, beans, nuts, and seeds.  Heart-healthy fats. These are fats that are good for your heart.  It involves eating less:  Dairy.  Meat and eggs.  Processed foods with added sugar, salt, and fat.  This type of diet can help prevent certain conditions. It can also improve outcomes if you have a long-term (chronic) disease, such as kidney or heart disease.  What are tips for following this plan?  Reading food labels  Check packaged foods for:  The serving size. For foods such as rice and pasta, the serving size is the amount of cooked product, not dry.  The total fat. Avoid foods with saturated fat or trans fat.  Added sugars, such as corn syrup.  Shopping    Try to have a balanced diet. Buy a variety of foods, such as:  Fresh fruits and vegetables. You may be able to get these from local [employer] markets. You can also buy them frozen.  Grains, beans, nuts, and seeds. Some of these can be bought in bulk.  Fresh seafood.  Poultry and eggs.  Low-fat dairy products.  Buy whole ingredients instead of foods that have already been packaged.  If you can't get fresh seafood, buy precooked frozen shrimp or canned fish, such as tuna, salmon, or sardines.  Stock your pantry so you always have certain foods on hand, such as olive oil, canned tuna, canned tomatoes, rice, pasta, and beans.  Cooking  Cook foods with extra-virgin olive oil instead of using butter or other vegetable oils.  Have meat as a side dish. Have vegetables or grains as your main dish. This means having meat in small portions or adding small amounts of meat to foods like pasta or stew.  Use beans or vegetables instead of meat in common dishes like chili or lasagna.  Try out different cooking methods. Try roasting, broiling, steaming, and sautéing vegetables.  Add frozen vegetables to soups, stews,  pasta, or rice.  Add nuts or seeds for added healthy fats and plant protein at each meal. You can add these to yogurt, salads, or vegetable dishes.  Marinate fish or vegetables using olive oil, lemon juice, garlic, and fresh herbs.  Meal planning  Plan to eat a vegetarian meal one day each week. Try to work up to two vegetarian meals, if possible.  Eat seafood two or more times a week.  Have healthy snacks on hand. These may include:  Vegetable sticks with hummus.  Greek yogurt.  Fruit and nut trail mix.  Eat balanced meals. These should include:  Fruit: 2-3 servings a day.  Vegetables: 4-5 servings a day.  Low-fat dairy: 2 servings a day.  Fish, poultry, or lean meat: 1 serving a day.  Beans and legumes: 2 or more servings a week.  Nuts and seeds: 1-2 servings a day.  Whole grains: 6-8 servings a day.  Extra-virgin olive oil: 3-4 servings a day.  Limit red meat and sweets to just a few servings a month.  Lifestyle    Try to cook and eat meals with your family.  Drink enough fluid to keep your pee (urine) pale yellow.  Be active every day. This includes:  Aerobic exercise, which is exercise that causes your heart to beat faster. Examples include running and swimming.  Leisure activities like gardening, walking, or housework.  Get 7-8 hours of sleep each night.  Drink red wine if your provider says you can. A glass of wine is 5 oz (150 mL). You may be allowed to have:  Up to 1 glass a day if you're female and not pregnant.  Up to 2 glasses a day if you're male.  What foods should I eat?  Fruits  Apples. Apricots. Avocado. Berries. Bananas. Cherries. Dates. Figs. Grapes. Subhash. Melon. Oranges. Peaches. Plums. Pomegranate.  Vegetables  Artichokes. Beets. Broccoli. Cabbage. Carrots. Eggplant. Green beans. Chard. Kale. Spinach. Onions. Leeks. Peas. Squash. Tomatoes. Peppers. Radishes.  Grains  Whole-grain pasta. Brown rice. Bulgur wheat. Polenta. Couscous. Whole-wheat bread. Oatmeal. Quinoa.  Meats and other  proteins  Beans. Almonds. Sunflower seeds. Pine nuts. Peanuts. Cod. [place]. Scallops. Shrimp. Tuna. Tilapia. Clams. Oysters. Eggs. Chicken or turkey without skin.  Dairy  Low-fat milk. Cheese. Greek yogurt.  Fats and oils  Extra-virgin olive oil. Avocado oil. Grapeseed oil.  Beverages  Water. Red wine. Herbal tea.  Sweets and desserts  Greek yogurt with honey. Baked apples. Poached pears. Trail mix.  Seasonings and condiments  Basil. Cilantro. Coriander. Cumin. Mint. Parsley. Suresh. Rosemary. Tarragon. Garlic. Oregano. Thyme. Pepper. Balsamic vinegar. Tahini. Hummus. Tomato sauce. Olives. Mushrooms.  The items listed above may not be all the foods and drinks you can have. Talk to a dietitian to learn more.  What foods should I limit?  This is a list of foods that should be eaten rarely.  Fruits  Fruit canned in syrup.  Vegetables  Deep-fried potatoes, like French fries.  Grains  Packaged pasta or rice dishes. Cereal with added sugar. Snacks with added sugar.  Meats and other proteins  Beef. Pork. Lamb. Chicken or turkey with skin. Hot dogs. Lu.  Dairy  Ice cream. Sour cream. Whole milk.  Fats and oils  Butter. Canola oil. Vegetable oil. Beef fat (tallow). Lard.  Beverages  Juice. Sugar-sweetened soft drinks. Beer. Liquor and spirits.  Sweets and desserts  Cookies. Cakes. Pies. Candy.  Seasonings and condiments  Mayonnaise. Pre-made sauces and marinades.  The items listed above may not be all the foods and drinks you should limit. Talk to a dietitian to learn more.  Where to find more information  American Heart Association (AHA): heart.org  This information is not intended to replace advice given to you by your health care provider. Make sure you discuss any questions you have with your health care provider.  Document Revised: 03/31/2024 Document Reviewed: 03/31/2024  Elsevier Patient Education © 2024 [employer]

## 2025-08-24 DIAGNOSIS — G45.9 TIA (TRANSIENT ISCHEMIC ATTACK): ICD-10-CM

## 2025-08-25 RX ORDER — CLOPIDOGREL BISULFATE 75 MG/1
TABLET ORAL
Qty: 30 TABLET | Refills: 0 | OUTPATIENT
Start: 2025-08-25